# Patient Record
Sex: MALE | Race: WHITE | NOT HISPANIC OR LATINO | Employment: UNEMPLOYED | URBAN - METROPOLITAN AREA
[De-identification: names, ages, dates, MRNs, and addresses within clinical notes are randomized per-mention and may not be internally consistent; named-entity substitution may affect disease eponyms.]

---

## 2017-01-23 ENCOUNTER — HOSPITAL ENCOUNTER (OUTPATIENT)
Dept: INFUSION THERAPY | Facility: HOSPITAL | Age: 67
Discharge: HOME | End: 2017-01-23
Admitting: INTERNAL MEDICINE

## 2017-01-23 VITALS
RESPIRATION RATE: 16 BRPM | DIASTOLIC BLOOD PRESSURE: 57 MMHG | TEMPERATURE: 98.1 F | OXYGEN SATURATION: 91 % | SYSTOLIC BLOOD PRESSURE: 109 MMHG | HEART RATE: 69 BPM

## 2017-01-23 DIAGNOSIS — D64.9 ANEMIA, UNSPECIFIED TYPE: Primary | ICD-10-CM

## 2017-01-23 LAB
ABO GROUP BLD: NORMAL
BLD GP AB SCN SERPL QL: NEGATIVE
RH BLD: POSITIVE

## 2017-01-23 PROCEDURE — 86901 BLOOD TYPING SEROLOGIC RH(D): CPT

## 2017-01-23 PROCEDURE — 86900 BLOOD TYPING SEROLOGIC ABO: CPT

## 2017-01-23 PROCEDURE — 36430 TRANSFUSION BLD/BLD COMPNT: CPT

## 2017-01-23 PROCEDURE — 86850 RBC ANTIBODY SCREEN: CPT

## 2017-01-23 PROCEDURE — G0463 HOSPITAL OUTPT CLINIC VISIT: HCPCS

## 2017-01-23 PROCEDURE — 94640 AIRWAY INHALATION TREATMENT: CPT

## 2017-01-23 PROCEDURE — P9016 RBC LEUKOCYTES REDUCED: HCPCS

## 2017-01-23 PROCEDURE — 36415 COLL VENOUS BLD VENIPUNCTURE: CPT

## 2017-01-23 PROCEDURE — A9270 NON-COVERED ITEM OR SERVICE: HCPCS | Performed by: INTERNAL MEDICINE

## 2017-01-23 PROCEDURE — 63710000001 ACETAMINOPHEN 325 MG TABLET: Performed by: INTERNAL MEDICINE

## 2017-01-23 PROCEDURE — 63710000001 ALBUTEROL PER 1 MG: Performed by: INTERNAL MEDICINE

## 2017-01-23 PROCEDURE — 86920 COMPATIBILITY TEST SPIN: CPT

## 2017-01-23 PROCEDURE — 94799 UNLISTED PULMONARY SVC/PX: CPT

## 2017-01-23 RX ORDER — PROMETHAZINE HYDROCHLORIDE 25 MG/1
25 TABLET ORAL
COMMUNITY
Start: 2016-10-20

## 2017-01-23 RX ORDER — ALBUTEROL SULFATE 90 UG/1
2 AEROSOL, METERED RESPIRATORY (INHALATION) EVERY 6 HOURS
COMMUNITY

## 2017-01-23 RX ORDER — ASPIRIN 325 MG
81 TABLET ORAL
COMMUNITY
End: 2017-02-27 | Stop reason: SDUPTHER

## 2017-01-23 RX ORDER — ALBUTEROL SULFATE 2.5 MG/3ML
2.5 SOLUTION RESPIRATORY (INHALATION) EVERY 4 HOURS PRN
Status: DISCONTINUED | OUTPATIENT
Start: 2017-01-23 | End: 2017-01-25 | Stop reason: HOSPADM

## 2017-01-23 RX ORDER — ACETAMINOPHEN 325 MG/1
650 TABLET ORAL EVERY 4 HOURS PRN
Status: DISCONTINUED | OUTPATIENT
Start: 2017-01-23 | End: 2017-01-25 | Stop reason: HOSPADM

## 2017-01-23 RX ORDER — POTASSIUM CHLORIDE 20MEQ/15ML
40 LIQUID (ML) ORAL DAILY
COMMUNITY

## 2017-01-23 RX ORDER — LISINOPRIL 5 MG/1
5 TABLET ORAL
COMMUNITY

## 2017-01-23 RX ORDER — ZOLPIDEM TARTRATE 5 MG/1
2.5 TABLET ORAL DAILY
COMMUNITY

## 2017-01-23 RX ADMIN — ALBUTEROL SULFATE 2.5 MG: 2.5 SOLUTION RESPIRATORY (INHALATION) at 13:38

## 2017-01-23 RX ADMIN — ACETAMINOPHEN 650 MG: 325 TABLET ORAL at 11:41

## 2017-01-23 NOTE — PROGRESS NOTES
Patient alert and oriented. No complaints as present. Lungs sound congested. Patient coughing up large amount of yellowish green tinged thick mucous. Call placed to Dr. Muhammad at 0822 to get orders for breathing treatment. Feeding tube in place but plugged; patient states only uses at night.

## 2017-01-23 NOTE — PROGRESS NOTES
Breath sounds diminished but mostly clear. Continues to have productive cough. Respiratory here to give mini neb. Oxygen sat 96%.

## 2017-01-23 NOTE — PROGRESS NOTES
0835 Spoke with WILLIE Wade, who gave orders for Tylenol and albuterol min nebs. See orders for details.

## 2017-01-23 NOTE — IP AVS SNAPSHOT
Neville Mendez   1/23/2017  8:30 AM   BLOOD TRANSFUSION    Provider:  ROOM [facility]   Department:  [facility] CARE   Dept Phone:  [phone]                Your Full Care Plan           Instructions    Blood Transfusion, Care After  These instructions give you information about caring for yourself after your procedure. Your doctor may also give you more specific instructions. Call your doctor if you have any problems or questions after your procedure.   HOME CARE  · Take medicines only as told by your doctor. Ask your doctor if you can take an over-the-counter pain reliever if you have a fever or headache a day or two after your procedure.  · Return to your normal activities as told by your doctor.  GET HELP IF:   · You develop redness or irritation at your IV site.  · You have a fever, chills, or a headache that does not go away.  · Your pee (urine) is darker than normal.  · Your urine turns:    Pink.    Red.    Brown.  · The white part of your eye turns yellow (jaundice).  · You feel weak after doing your normal activities.  GET HELP RIGHT AWAY IF:   · You have trouble breathing.  · You have fever and chills and you also have:    Anxiety.    Chest or back pain.    Flushed or pink skin.    Clammy or sweaty skin.    A fast heartbeat.    A sick feeling in your stomach (nausea).     This information is not intended to replace advice given to you by your health care provider. Make sure you discuss any questions you have with your health care provider.     Document Released: 01/08/2016 Document Reviewed: 01/08/2016  [employer] Interactive Patient Education ©2016 [employer]            Your Updated Medication List      ASK your doctor about these medications     albuterol 108 (90 BASE) MCG/ACT inhaler   Commonly known as:  PROVENTIL HFA;VENTOLIN HFA       aspirin 325 MG tablet       guaiFENesin 100 MG/5ML solution oral solution   Commonly known as:  ROBITUSSIN       lisinopril  5 MG tablet   Commonly known as:  PRINIVIL,ZESTRIL       potassium chloride 20 MEQ/15ML (10%) solution   Commonly known as:  KAYCIEL       promethazine 25 MG tablet   Commonly known as:  PHENERGAN       zolpidem 5 MG tablet   Commonly known as:  AMBIEN               MyChart Signup     Our records indicate that you have declined [facility] signup. If you would like to sign up for [facility], please email [email or web address] or call [phone] to obtain an activation code.             Other Info from Your Visit           Allergies     No Known Allergies      Reason for Visit     Outpatient Infusion blood transfusion      Vital Signs     Blood Pressure Pulse Temperature Respirations Oxygen Saturation Smoking Status    121/70 77 98.2 °F (36.8 °C) (Oral) 18 91% Former Smoker      Discharge Instructions     None         SYMPTOMS OF A STROKE    Call 911 or have someone take you to the Emergency Department if you have any of the following:    · Sudden numbness or weakness of your face, arm or leg especially on one side of the body  · Sudden confusion, diffiiculty speaking or trouble understanding   · Changes in your vision or loss of sight in one eye  · Sudden severe headache with no known cause  · sudden dizziness, trouble walking, loss of balance or coordination    It is important to seek emergency care right away if you have further stroke symptoms. If you get emergency help quickly, the powerful clot-dissolving medicines can reduce the disabilities caused by a stroke.     For more information:    American Stroke Association  4-748-6-STROKE  www.strokeassociation.org           IF YOU SMOKE OR USE TOBACCO PLEASE READ THE FOLLOWING:    Why is smoking bad for me?  Smoking increases the risk of heart disease, lung disease, vascular disease, stroke, and cancer.     If you smoke, STOP!    If you would like more information on quitting smoking, please visit the [facility] website:  [email or web address]   This link will provide additional resources including the QUIT line and the [employer] support groups.     For more information:    American Cancer Society  ([phone]    American Heart Association  1-840.827.7657

## 2017-01-23 NOTE — PROGRESS NOTES
Tylenol 650 mg crushed, dissolved and given through feeding tube. Feeding tube would not flush so had to rinse out the connector; then flushed easily. Total of 180 cc's water used. Thick yellow drainage noted around and leaking from feeding tube; site cleansed with peroxide and redressed.

## 2017-01-23 NOTE — PATIENT INSTRUCTIONS
Blood Transfusion, Care After  These instructions give you information about caring for yourself after your procedure. Your doctor may also give you more specific instructions. Call your doctor if you have any problems or questions after your procedure.   HOME CARE  · Take medicines only as told by your doctor. Ask your doctor if you can take an over-the-counter pain reliever if you have a fever or headache a day or two after your procedure.  · Return to your normal activities as told by your doctor.  GET HELP IF:   · You develop redness or irritation at your IV site.  · You have a fever, chills, or a headache that does not go away.  · Your pee (urine) is darker than normal.  · Your urine turns:    Pink.    Red.    Brown.  · The white part of your eye turns yellow (jaundice).  · You feel weak after doing your normal activities.  GET HELP RIGHT AWAY IF:   · You have trouble breathing.  · You have fever and chills and you also have:    Anxiety.    Chest or back pain.    Flushed or pink skin.    Clammy or sweaty skin.    A fast heartbeat.    A sick feeling in your stomach (nausea).     This information is not intended to replace advice given to you by your health care provider. Make sure you discuss any questions you have with your health care provider.     Document Released: 01/08/2016 Document Reviewed: 01/08/2016  [employer] Interactive Patient Education ©2016 [employer]

## 2017-01-23 NOTE — PROGRESS NOTES
Tolerated transfusion with no concerns or evidence of reaction. Respirations appear un-labored however oxygen sat 91% on room air. Had one mini neb while patricio. Consistent productive cough with large amounts of yellow tinged thick mucous. Very pleasant. Poor appititte.

## 2017-01-24 LAB
ABO + RH BLD: NORMAL
ABO + RH BLD: NORMAL
BH BB BLOOD EXPIRATION DATE: NORMAL
BH BB BLOOD EXPIRATION DATE: NORMAL
BH BB BLOOD TYPE BARCODE: 5100
BH BB BLOOD TYPE BARCODE: 5100
BH BB DISPENSE STATUS: NORMAL
BH BB DISPENSE STATUS: NORMAL
BH BB PRODUCT CODE: NORMAL
BH BB PRODUCT CODE: NORMAL
BH BB UNIT NUMBER: NORMAL
BH BB UNIT NUMBER: NORMAL
CROSSMATCH INTERPRETATION: NORMAL
CROSSMATCH INTERPRETATION: NORMAL
UNIT  ABO: NORMAL
UNIT  ABO: NORMAL
UNIT  RH: NORMAL
UNIT  RH: NORMAL

## 2017-02-03 ENCOUNTER — HOSPITAL ENCOUNTER (INPATIENT)
Facility: HOSPITAL | Age: 67
LOS: 6 days | Discharge: NURSING, REHAB OR LONG-TERM CARE | End: 2017-02-09
Attending: EMERGENCY MEDICINE | Admitting: INTERNAL MEDICINE

## 2017-02-03 ENCOUNTER — APPOINTMENT (OUTPATIENT)
Dept: GENERAL RADIOLOGY | Facility: HOSPITAL | Age: 67
End: 2017-02-03

## 2017-02-03 DIAGNOSIS — J18.9 PNEUMONIA OF RIGHT LOWER LOBE DUE TO INFECTIOUS ORGANISM: Primary | ICD-10-CM

## 2017-02-03 DIAGNOSIS — Z66 DNR (DO NOT RESUSCITATE): ICD-10-CM

## 2017-02-03 DIAGNOSIS — F03.90 DEMENTIA WITHOUT BEHAVIORAL DISTURBANCE, UNSPECIFIED DEMENTIA TYPE: ICD-10-CM

## 2017-02-03 DIAGNOSIS — R26.2 DIFFICULTY WALKING: ICD-10-CM

## 2017-02-03 DIAGNOSIS — R91.8 LUNG MASS: ICD-10-CM

## 2017-02-03 DIAGNOSIS — R58 BLEEDING: ICD-10-CM

## 2017-02-03 DIAGNOSIS — R13.10 DYSPHAGIA, UNSPECIFIED TYPE: ICD-10-CM

## 2017-02-03 LAB
ABO GROUP BLD: NORMAL
ALBUMIN SERPL-MCNC: 3.5 G/DL (ref 3.5–5.2)
ALBUMIN/GLOB SERPL: 0.7 G/DL
ALP SERPL-CCNC: 84 U/L (ref 39–117)
ALT SERPL W P-5'-P-CCNC: 13 U/L (ref 1–41)
ANION GAP SERPL CALCULATED.3IONS-SCNC: 10.5 MMOL/L
AST SERPL-CCNC: 16 U/L (ref 1–40)
BASOPHILS # BLD AUTO: 0.01 10*3/MM3 (ref 0–0.2)
BASOPHILS NFR BLD AUTO: 0.1 % (ref 0–1.5)
BILIRUB SERPL-MCNC: 0.6 MG/DL (ref 0.1–1.2)
BLD GP AB SCN SERPL QL: NEGATIVE
BUN BLD-MCNC: 13 MG/DL (ref 8–23)
BUN/CREAT SERPL: 21.3 (ref 7–25)
CALCIUM SPEC-SCNC: 9.7 MG/DL (ref 8.6–10.5)
CHLORIDE SERPL-SCNC: 89 MMOL/L (ref 98–107)
CO2 SERPL-SCNC: 32.5 MMOL/L (ref 22–29)
CREAT BLD-MCNC: 0.61 MG/DL (ref 0.76–1.27)
D-LACTATE SERPL-SCNC: 1.5 MMOL/L (ref 0.5–2)
DEPRECATED RDW RBC AUTO: 47.4 FL (ref 37–54)
EOSINOPHIL # BLD AUTO: 0.11 10*3/MM3 (ref 0–0.7)
EOSINOPHIL NFR BLD AUTO: 0.9 % (ref 0.3–6.2)
ERYTHROCYTE [DISTWIDTH] IN BLOOD BY AUTOMATED COUNT: 14.7 % (ref 11.5–14.5)
GFR SERPL CREATININE-BSD FRML MDRD: 131 ML/MIN/1.73
GLOBULIN UR ELPH-MCNC: 4.7 GM/DL
GLUCOSE BLD-MCNC: 135 MG/DL (ref 65–99)
HBA1C MFR BLD: 5.37 % (ref 4.8–5.6)
HCT VFR BLD AUTO: 35.1 % (ref 40.4–52.2)
HGB BLD-MCNC: 11.1 G/DL (ref 13.7–17.6)
IMM GRANULOCYTES # BLD: 0.04 10*3/MM3 (ref 0–0.03)
IMM GRANULOCYTES NFR BLD: 0.3 % (ref 0–0.5)
INR PPP: 1.19 (ref 0.9–1.1)
LYMPHOCYTES # BLD AUTO: 0.96 10*3/MM3 (ref 0.9–4.8)
LYMPHOCYTES NFR BLD AUTO: 8.1 % (ref 19.6–45.3)
MCH RBC QN AUTO: 27.8 PG (ref 27–32.7)
MCHC RBC AUTO-ENTMCNC: 31.6 G/DL (ref 32.6–36.4)
MCV RBC AUTO: 87.8 FL (ref 79.8–96.2)
MONOCYTES # BLD AUTO: 0.85 10*3/MM3 (ref 0.2–1.2)
MONOCYTES NFR BLD AUTO: 7.2 % (ref 5–12)
NEUTROPHILS # BLD AUTO: 9.81 10*3/MM3 (ref 1.9–8.1)
NEUTROPHILS NFR BLD AUTO: 83.4 % (ref 42.7–76)
PLATELET # BLD AUTO: 558 10*3/MM3 (ref 140–500)
PMV BLD AUTO: 9.3 FL (ref 6–12)
POTASSIUM BLD-SCNC: 3.9 MMOL/L (ref 3.5–5.2)
PROCALCITONIN SERPL-MCNC: 0.12 NG/ML (ref 0.1–0.25)
PROT SERPL-MCNC: 8.2 G/DL (ref 6–8.5)
PROTHROMBIN TIME: 14.7 SECONDS (ref 11.7–14.2)
RBC # BLD AUTO: 4 10*6/MM3 (ref 4.6–6)
RH BLD: POSITIVE
SODIUM BLD-SCNC: 132 MMOL/L (ref 136–145)
WBC NRBC COR # BLD: 11.78 10*3/MM3 (ref 4.5–10.7)

## 2017-02-03 PROCEDURE — 86850 RBC ANTIBODY SCREEN: CPT

## 2017-02-03 PROCEDURE — 84145 PROCALCITONIN (PCT): CPT | Performed by: EMERGENCY MEDICINE

## 2017-02-03 PROCEDURE — 36415 COLL VENOUS BLD VENIPUNCTURE: CPT

## 2017-02-03 PROCEDURE — 85610 PROTHROMBIN TIME: CPT | Performed by: EMERGENCY MEDICINE

## 2017-02-03 PROCEDURE — 87040 BLOOD CULTURE FOR BACTERIA: CPT | Performed by: EMERGENCY MEDICINE

## 2017-02-03 PROCEDURE — 86900 BLOOD TYPING SEROLOGIC ABO: CPT

## 2017-02-03 PROCEDURE — 71010 HC CHEST PA OR AP: CPT

## 2017-02-03 PROCEDURE — 87899 AGENT NOS ASSAY W/OPTIC: CPT | Performed by: INTERNAL MEDICINE

## 2017-02-03 PROCEDURE — 87449 NOS EACH ORGANISM AG IA: CPT | Performed by: INTERNAL MEDICINE

## 2017-02-03 PROCEDURE — 85025 COMPLETE CBC W/AUTO DIFF WBC: CPT | Performed by: EMERGENCY MEDICINE

## 2017-02-03 PROCEDURE — 83605 ASSAY OF LACTIC ACID: CPT | Performed by: EMERGENCY MEDICINE

## 2017-02-03 PROCEDURE — 25010000002 PIPERACILLIN SOD-TAZOBACTAM PER 1 G: Performed by: EMERGENCY MEDICINE

## 2017-02-03 PROCEDURE — 83036 HEMOGLOBIN GLYCOSYLATED A1C: CPT | Performed by: INTERNAL MEDICINE

## 2017-02-03 PROCEDURE — 25810000003 DEXTROSE-NACL PER 500 ML: Performed by: INTERNAL MEDICINE

## 2017-02-03 PROCEDURE — 99285 EMERGENCY DEPT VISIT HI MDM: CPT

## 2017-02-03 PROCEDURE — 86901 BLOOD TYPING SEROLOGIC RH(D): CPT

## 2017-02-03 PROCEDURE — 25010000002 VANCOMYCIN: Performed by: EMERGENCY MEDICINE

## 2017-02-03 PROCEDURE — 80053 COMPREHEN METABOLIC PANEL: CPT | Performed by: EMERGENCY MEDICINE

## 2017-02-03 RX ORDER — ASPIRIN 81 MG/1
81 TABLET, CHEWABLE ORAL DAILY
COMMUNITY

## 2017-02-03 RX ORDER — DEXTROSE AND SODIUM CHLORIDE 5; .45 G/100ML; G/100ML
100 INJECTION, SOLUTION INTRAVENOUS CONTINUOUS
Status: DISCONTINUED | OUTPATIENT
Start: 2017-02-03 | End: 2017-02-03

## 2017-02-03 RX ORDER — CALCIUM POLYCARBOPHIL 625 MG 625 MG/1
625 TABLET ORAL 2 TIMES DAILY
COMMUNITY

## 2017-02-03 RX ORDER — MIRTAZAPINE 15 MG/1
7.5 TABLET, FILM COATED ORAL NIGHTLY
COMMUNITY

## 2017-02-03 RX ORDER — HYDROCODONE BITARTRATE AND ACETAMINOPHEN 5; 325 MG/1; MG/1
1 TABLET ORAL EVERY 4 HOURS PRN
Status: DISCONTINUED | OUTPATIENT
Start: 2017-02-03 | End: 2017-02-09 | Stop reason: HOSPADM

## 2017-02-03 RX ORDER — LISINOPRIL 5 MG/1
5 TABLET ORAL
Status: DISCONTINUED | OUTPATIENT
Start: 2017-02-04 | End: 2017-02-09 | Stop reason: HOSPADM

## 2017-02-03 RX ORDER — ASPIRIN 81 MG/1
81 TABLET, CHEWABLE ORAL DAILY
Status: DISCONTINUED | OUTPATIENT
Start: 2017-02-03 | End: 2017-02-09 | Stop reason: HOSPADM

## 2017-02-03 RX ORDER — DIPHENOXYLATE HYDROCHLORIDE AND ATROPINE SULFATE 2.5; .025 MG/1; MG/1
1 TABLET ORAL DAILY
Status: DISCONTINUED | OUTPATIENT
Start: 2017-02-04 | End: 2017-02-09 | Stop reason: HOSPADM

## 2017-02-03 RX ORDER — NUT.TX.GLUCOSE INTOLERANCE,SOY
30 BAR ORAL 3 TIMES DAILY
COMMUNITY

## 2017-02-03 RX ORDER — VANCOMYCIN HYDROCHLORIDE 1 G/200ML
15 INJECTION, SOLUTION INTRAVENOUS EVERY 12 HOURS
Status: DISCONTINUED | OUTPATIENT
Start: 2017-02-04 | End: 2017-02-03 | Stop reason: CLARIF

## 2017-02-03 RX ORDER — ALBUTEROL SULFATE 2.5 MG/3ML
2.5 SOLUTION RESPIRATORY (INHALATION) 3 TIMES DAILY
COMMUNITY
End: 2017-02-27 | Stop reason: SDUPTHER

## 2017-02-03 RX ORDER — ALBUTEROL SULFATE 90 UG/1
2 AEROSOL, METERED RESPIRATORY (INHALATION) EVERY 4 HOURS PRN
COMMUNITY
End: 2017-02-27 | Stop reason: SDUPTHER

## 2017-02-03 RX ORDER — CALCIUM POLYCARBOPHIL 625 MG 625 MG/1
625 TABLET ORAL 2 TIMES DAILY
Status: DISCONTINUED | OUTPATIENT
Start: 2017-02-03 | End: 2017-02-09 | Stop reason: HOSPADM

## 2017-02-03 RX ORDER — CLOBETASOL PROPIONATE 0.5 MG/G
OINTMENT TOPICAL DAILY
COMMUNITY

## 2017-02-03 RX ORDER — MULTIVIT-MIN/IRON/FOLIC ACID/K 18-600-40
1 CAPSULE ORAL DAILY
COMMUNITY

## 2017-02-03 RX ORDER — DEXTROSE AND SODIUM CHLORIDE 5; .9 G/100ML; G/100ML
75 INJECTION, SOLUTION INTRAVENOUS CONTINUOUS
Status: DISCONTINUED | OUTPATIENT
Start: 2017-02-03 | End: 2017-02-09 | Stop reason: HOSPADM

## 2017-02-03 RX ORDER — LACTOSE-REDUCED FOOD/FIBER
LIQUID (ML) ORAL
COMMUNITY

## 2017-02-03 RX ORDER — ZOLPIDEM TARTRATE 5 MG/1
2.5 TABLET ORAL DAILY
Status: DISCONTINUED | OUTPATIENT
Start: 2017-02-03 | End: 2017-02-04

## 2017-02-03 RX ORDER — IBUPROFEN 600 MG/1
600 TABLET ORAL 3 TIMES DAILY
COMMUNITY

## 2017-02-03 RX ORDER — ASCORBIC ACID 500 MG
500 TABLET ORAL DAILY
Status: DISCONTINUED | OUTPATIENT
Start: 2017-02-04 | End: 2017-02-09 | Stop reason: HOSPADM

## 2017-02-03 RX ORDER — SODIUM CHLORIDE 0.9 % (FLUSH) 0.9 %
10 SYRINGE (ML) INJECTION AS NEEDED
Status: DISCONTINUED | OUTPATIENT
Start: 2017-02-03 | End: 2017-02-09 | Stop reason: HOSPADM

## 2017-02-03 RX ORDER — HYDROCODONE BITARTRATE AND ACETAMINOPHEN 5; 325 MG/1; MG/1
1 TABLET ORAL 4 TIMES DAILY PRN
Status: ON HOLD | COMMUNITY
End: 2017-02-09

## 2017-02-03 RX ORDER — CALCIUM CARBONATE 500(1250)
500 TABLET ORAL 2 TIMES DAILY
Status: DISCONTINUED | OUTPATIENT
Start: 2017-02-03 | End: 2017-02-03 | Stop reason: CLARIF

## 2017-02-03 RX ORDER — ALBUTEROL SULFATE 90 UG/1
2 AEROSOL, METERED RESPIRATORY (INHALATION) EVERY 4 HOURS PRN
Status: DISCONTINUED | OUTPATIENT
Start: 2017-02-03 | End: 2017-02-05 | Stop reason: CLARIF

## 2017-02-03 RX ORDER — SODIUM CHLORIDE 0.9 % (FLUSH) 0.9 %
1-10 SYRINGE (ML) INJECTION AS NEEDED
Status: DISCONTINUED | OUTPATIENT
Start: 2017-02-03 | End: 2017-02-09 | Stop reason: HOSPADM

## 2017-02-03 RX ORDER — CALCIUM CARBONATE 500(1250)
500 TABLET ORAL 2 TIMES DAILY
COMMUNITY

## 2017-02-03 RX ORDER — MIRTAZAPINE 15 MG/1
7.5 TABLET, FILM COATED ORAL NIGHTLY
Status: DISCONTINUED | OUTPATIENT
Start: 2017-02-03 | End: 2017-02-09 | Stop reason: HOSPADM

## 2017-02-03 RX ORDER — ALBUTEROL SULFATE 2.5 MG/3ML
2.5 SOLUTION RESPIRATORY (INHALATION) EVERY 4 HOURS PRN
COMMUNITY
End: 2017-02-27 | Stop reason: SDUPTHER

## 2017-02-03 RX ADMIN — VANCOMYCIN HYDROCHLORIDE 1500 MG: 1 INJECTION, POWDER, LYOPHILIZED, FOR SOLUTION INTRAVENOUS at 18:01

## 2017-02-03 RX ADMIN — DEXTROSE AND SODIUM CHLORIDE 75 ML/HR: 5; 900 INJECTION, SOLUTION INTRAVENOUS at 22:42

## 2017-02-03 RX ADMIN — TAZOBACTAM SODIUM AND PIPERACILLIN SODIUM 4.5 G: 500; 4 INJECTION, SOLUTION INTRAVENOUS at 17:26

## 2017-02-03 NOTE — ED PROVIDER NOTES
EMERGENCY DEPARTMENT ENCOUNTER    CHIEF COMPLAINT  Chief Complaint: Tube Change  History given by: Pt  History limited by: nothing  Room Number: 32/32  PMD: No Known Provider  NH: [facility]    HPI:  Pt is a 66 y.o. male who presents [facility] complaining of bleeding around his g-tube, which he uses for nutrition, which started last night while sleeping. Pt states his tube has never been changed out and may be up to one year old. Pt denies recent illness or any damage to the tube. Pt also denies seeing any blood in the tube. Pt states he has a hx of throat cancer, which was treated with surgery. Pt also c/o a cough with yellow sputum for a week.    Duration:  Several hours  Onset: unknown  Timing: constant  Location: g-tube  Radiation: none  Quality: bleeding  Intensity/Severity: moderate  Progression: unchanged  Associated Symptoms: Pt has had a cough with yellow sputum for a week.  Aggravating Factors: none  Alleviating Factors: none  Previous Episodes: none  Treatment before arrival: none    PAST MEDICAL HISTORY  Active Ambulatory Problems     Diagnosis Date Noted   • No Active Ambulatory Problems     Resolved Ambulatory Problems     Diagnosis Date Noted   • No Resolved Ambulatory Problems     Past Medical History   Diagnosis Date   • Brain cancer    • COPD (chronic obstructive pulmonary disease)    • Coronary artery disease    • Dysphagia    • Hiatal hernia    • Hyperlipidemia    • Hypertension        PAST SURGICAL HISTORY  Past Surgical History   Procedure Laterality Date   • Spine surgery     • Cardiac surgery         FAMILY HISTORY  History reviewed. No pertinent family history.    SOCIAL HISTORY  Social History     Social History   • Marital status:      Spouse name: N/A   • Number of children: N/A   • Years of education: N/A     Occupational History   • Not on file.     Social History Main Topics   • Smoking status: Former Smoker   • Smokeless tobacco: Not on file       Comment: stopped about a year ago   • Alcohol use Not on file   • Drug use: Not on file   • Sexual activity: Not on file     Other Topics Concern   • Not on file     Social History Narrative   • No narrative on file       ALLERGIES  Review of patient's allergies indicates no known allergies.    REVIEW OF SYSTEMS  Review of Systems   Constitutional: Negative for activity change, appetite change and fever.   HENT: Negative for congestion and sore throat.    Eyes: Negative.    Respiratory: Positive for cough (with yellow sputum). Negative for shortness of breath.    Cardiovascular: Negative for chest pain and leg swelling.   Gastrointestinal: Negative for abdominal pain, diarrhea and vomiting.   Endocrine: Negative.    Genitourinary: Negative for decreased urine volume and dysuria.   Musculoskeletal: Negative for neck pain.   Skin: Positive for wound (bleeding around g-tube site). Negative for rash.   Allergic/Immunologic: Negative.    Neurological: Negative for weakness, numbness and headaches.   Hematological: Negative.    Psychiatric/Behavioral: Negative.    All other systems reviewed and are negative.      PHYSICAL EXAM  ED Triage Vitals   Temp Heart Rate Resp BP SpO2   02/03/17 1523 02/03/17 1523 02/03/17 1523 02/03/17 1523 02/03/17 1523   99.1 °F (37.3 °C) 92 18 135/93 95 %      Temp src Heart Rate Source Patient Position BP Location FiO2 (%)   02/03/17 1523 02/03/17 1523 02/03/17 1523 02/03/17 1523 --   Tympanic Monitor Lying Right arm        Physical Exam   Constitutional: He is oriented to person, place, and time and well-developed, well-nourished, and in no distress.   HENT:   Head: Normocephalic and atraumatic.   Eyes: EOM are normal. Pupils are equal, round, and reactive to light.   Neck: Normal range of motion. Neck supple.   Cardiovascular: Normal rate, regular rhythm and normal heart sounds.    HR=80s   Pulmonary/Chest: Effort normal. No respiratory distress. He has rhonchi (mild) in the right lower  field.   Pt has a cough   Abdominal: Soft. He exhibits no distension. There is no tenderness. There is no rebound and no guarding.   g-tube in left upper quadrant with bleeding around it and dressing in place which is saturated in blood.   Musculoskeletal: Normal range of motion. He exhibits no edema.   Neurological: He is alert and oriented to person, place, and time. He has normal sensation and normal strength.   Skin: Skin is warm and dry.   Psychiatric: Mood and affect normal.   Nursing note and vitals reviewed.      LAB RESULTS  Lab Results (last 24 hours)     Procedure Component Value Units Date/Time    CBC & Differential [[id number]] Collected:  02/03/17 1651    Specimen:  Blood Updated:  02/03/17 1730    Narrative:       The following orders were created for panel order CBC & Differential.  Procedure                               Abnormality         Status                     ---------                               -----------         ------                     CBC Auto Differential[[id number]]         Abnormal            Final result                 Please view results for these tests on the individual orders.    Comprehensive Metabolic Panel [[id number]]  (Abnormal) Collected:  02/03/17 1651    Specimen:  Blood Updated:  02/03/17 1742     Glucose 135 (H) mg/dL      BUN 13 mg/dL      Creatinine 0.61 (L) mg/dL      Sodium 132 (L) mmol/L      Potassium 3.9 mmol/L      Chloride 89 (L) mmol/L      CO2 32.5 (H) mmol/L      Calcium 9.7 mg/dL      Total Protein 8.2 g/dL      Albumin 3.50 g/dL      ALT (SGPT) 13 U/L      AST (SGOT) 16 U/L      Alkaline Phosphatase 84 U/L      Total Bilirubin 0.6 mg/dL      eGFR Non African Amer 131 mL/min/1.73      Globulin 4.7 gm/dL      A/G Ratio 0.7 g/dL      BUN/Creatinine Ratio 21.3      Anion Gap 10.5 mmol/L     Protime-INR [[id number]]  (Abnormal) Collected:  02/03/17 1651    Specimen:  Blood Updated:  02/03/17 1742     Protime 14.7 (H) Seconds      INR 1.19 (H)     CBC Auto  "Differential [[id number]]  (Abnormal) Collected:  02/03/17 1651    Specimen:  Blood Updated:  02/03/17 1730     WBC 11.78 (H) 10*3/mm3      RBC 4.00 (L) 10*6/mm3      Hemoglobin 11.1 (L) g/dL      Hematocrit 35.1 (L) %      MCV 87.8 fL      MCH 27.8 pg      MCHC 31.6 (L) g/dL      RDW 14.7 (H) %      RDW-SD 47.4 fl      MPV 9.3 fL      Platelets 558 (H) 10*3/mm3      Neutrophil % 83.4 (H) %      Lymphocyte % 8.1 (L) %      Monocyte % 7.2 %      Eosinophil % 0.9 %      Basophil % 0.1 %      Immature Grans % 0.3 %      Neutrophils, Absolute 9.81 (H) 10*3/mm3      Lymphocytes, Absolute 0.96 10*3/mm3      Monocytes, Absolute 0.85 10*3/mm3      Eosinophils, Absolute 0.11 10*3/mm3      Basophils, Absolute 0.01 10*3/mm3      Immature Grans, Absolute 0.04 (H) 10*3/mm3     Procalcitonin [[id number]]  (Normal) Collected:  02/03/17 1651    Specimen:  Blood Updated:  02/03/17 1746     Procalcitonin 0.12 ng/mL     Narrative:       As a Marker for Sepsis (Non-Neonates):   1. <0.5 ng/mL represents a low risk of severe sepsis and/or septic shock.  1. >2 ng/mL represents a high risk of severe sepsis and/or septic shock.    As a Marker for Lower Respiratory Tract Infections that require antibiotic therapy:  PCT on Admission     Antibiotic Therapy             6-12 Hrs later  > 0.5                Strongly Recommended            >0.25 - <0.5         Recommended  0.1 - 0.25           Discouraged                   Remeasure/reassess PCT  <0.1                 Strongly Discouraged          Remeasure/reassess PCT      As 28 day mortality risk marker: \"Change in Procalcitonin Result\" (> 80 % or <=80 %) if Day 0 (or Day 1) and Day 4 values are available. Refer to http://www.[facility]-pct-calculator.com/   Change in PCT <=80 %   A decrease of PCT levels below or equal to 80 % defines a positive change in PCT test result representing a higher risk for 28-day all-cause mortality of patients diagnosed with severe sepsis or septic shock.  Change in PCT > " 80 %   A decrease of PCT levels of more than 80 % defines a negative change in PCT result representing a lower risk for 28-day all-cause mortality of patients diagnosed with severe sepsis or septic shock.                Lactic Acid, Plasma [[id number]]  (Normal) Collected:  02/03/17 1713    Specimen:  Blood Updated:  02/03/17 1736     Lactate 1.5 mmol/L     Blood Culture [[id number]] Collected:  02/03/17 1713    Specimen:  Blood from Arm, Left Updated:  02/03/17 1719    Blood Culture [[id number]] Collected:  02/03/17 1713    Specimen:  Blood from Arm, Right Updated:  02/03/17 1719          I ordered the above labs and reviewed the results    RADIOLOGY  XR Chest 1 View   Final Result   1. Acute right base pneumonia.  2. No other change.  3. Recommend follow-up to confirm resolution        I ordered the above noted radiological studies. Interpreted by radiologist. Discussed with radiologist (Dr. Aden). Reviewed by me in PACS.       PROCEDURES  Procedures      PROGRESS AND CONSULTS  ED Course   1610  Ordered blood work, Protime-INR, and CXR for further analysis.    4:56 PM  Ordered Zocon and Vanc for healthcare acquired pneumonia.     5:13 PM  Rechecked with pt, who is resting comfortably. Informed pt of CXR showing pneumonia and plan to admit to hospital and treat with abx. Informed pt of plan to have GI specialist consult on pt's g-tube. Pt understands and agrees with the plan and all questions were addressed.   Upon reevaluation, pt has minimal bleeding on tube site. Placed SurgiSeal, Thrombi-Pad, 4x4's, and abdominal dressing with good hemostasis. Ordered pt be placed on 2L O2.     5:48 PM  Discussed pt's case with Dr. Burgess, hospitalist, who agrees to admit the pt to telemetry for pneumonia and consult GI to see the pt in the morning.     5:54 PM  Rechecked with pt who is resting comfortably in no acute distress and no active bleeding. Informed pt that Dr. Burgess will admit him to telemetry.    MEDICAL DECISION  MAKING  Results were reviewed/discussed with the patient and they were also made aware of online access. Pt also made aware that some labs, such as cultures, will not be resulted during ER visit and follow up with PMD is necessary.     MDM  Number of Diagnoses or Management Options  Bleeding from g-tube:   Pneumonia of right lower lobe due to infectious organism:      Amount and/or Complexity of Data Reviewed  Clinical lab tests: reviewed and ordered (Hemoglobin 11.1, WBC=11.78, lactate=1.5)  Tests in the radiology section of CPT®: reviewed and ordered (CXR shows acute right base pneumonia with no other changes.)  Decide to obtain previous medical records or to obtain history from someone other than the patient: yes  Review and summarize past medical records: yes (Pt has a hx of neck and throat cancer. Pt was sent in my NH due to cough and leaking around g-tube site.)  Discuss the patient with other providers: yes (Dr. Burgess, hospitalist)           DIAGNOSIS  Final diagnoses:   Pneumonia of right lower lobe due to infectious organism   Bleeding from g-tube       DISPOSITION  ADMISSION    Discussed treatment plan and reason for admission with pt/family and admitting physician.  Pt/family voiced understanding of the plan for admission for further testing/treatment as needed.         Latest Documented Vital Signs:  As of 6:32 PM  BP- 130/66 HR- 69 Temp- 99.1 °F (37.3 °C) (Tympanic) O2 sat- 100%    --  Documentation assistance provided by gavino Page for Dr. Ho.  Information recorded by the scribe was done at my direction and has been verified and validated by me.              Ronal Page  02/03/17 1818       Anil Ho MD  02/03/17 2777

## 2017-02-04 ENCOUNTER — APPOINTMENT (OUTPATIENT)
Dept: CT IMAGING | Facility: HOSPITAL | Age: 67
End: 2017-02-04

## 2017-02-04 LAB
ANION GAP SERPL CALCULATED.3IONS-SCNC: 12.6 MMOL/L
BASOPHILS # BLD AUTO: 0.02 10*3/MM3 (ref 0–0.2)
BASOPHILS NFR BLD AUTO: 0.2 % (ref 0–1.5)
BUN BLD-MCNC: 9 MG/DL (ref 8–23)
BUN/CREAT SERPL: 16.4 (ref 7–25)
CALCIUM SPEC-SCNC: 8.4 MG/DL (ref 8.6–10.5)
CHLORIDE SERPL-SCNC: 97 MMOL/L (ref 98–107)
CO2 SERPL-SCNC: 26.4 MMOL/L (ref 22–29)
CREAT BLD-MCNC: 0.55 MG/DL (ref 0.76–1.27)
DEPRECATED RDW RBC AUTO: 46.7 FL (ref 37–54)
EOSINOPHIL # BLD AUTO: 0.21 10*3/MM3 (ref 0–0.7)
EOSINOPHIL NFR BLD AUTO: 1.9 % (ref 0.3–6.2)
ERYTHROCYTE [DISTWIDTH] IN BLOOD BY AUTOMATED COUNT: 14.6 % (ref 11.5–14.5)
GFR SERPL CREATININE-BSD FRML MDRD: 148 ML/MIN/1.73
GLUCOSE BLD-MCNC: 125 MG/DL (ref 65–99)
HCT VFR BLD AUTO: 31.4 % (ref 40.4–52.2)
HGB BLD-MCNC: 9.9 G/DL (ref 13.7–17.6)
IMM GRANULOCYTES # BLD: 0.04 10*3/MM3 (ref 0–0.03)
IMM GRANULOCYTES NFR BLD: 0.4 % (ref 0–0.5)
L PNEUMO1 AG UR QL IA: NEGATIVE
LYMPHOCYTES # BLD AUTO: 1.05 10*3/MM3 (ref 0.9–4.8)
LYMPHOCYTES NFR BLD AUTO: 9.6 % (ref 19.6–45.3)
MCH RBC QN AUTO: 27.6 PG (ref 27–32.7)
MCHC RBC AUTO-ENTMCNC: 31.5 G/DL (ref 32.6–36.4)
MCV RBC AUTO: 87.5 FL (ref 79.8–96.2)
MONOCYTES # BLD AUTO: 0.87 10*3/MM3 (ref 0.2–1.2)
MONOCYTES NFR BLD AUTO: 8 % (ref 5–12)
NEUTROPHILS # BLD AUTO: 8.73 10*3/MM3 (ref 1.9–8.1)
NEUTROPHILS NFR BLD AUTO: 79.9 % (ref 42.7–76)
PLATELET # BLD AUTO: 467 10*3/MM3 (ref 140–500)
PMV BLD AUTO: 9.1 FL (ref 6–12)
POTASSIUM BLD-SCNC: 4.1 MMOL/L (ref 3.5–5.2)
RBC # BLD AUTO: 3.59 10*6/MM3 (ref 4.6–6)
S PNEUM AG SPEC QL LA: NEGATIVE
SODIUM BLD-SCNC: 136 MMOL/L (ref 136–145)
WBC NRBC COR # BLD: 10.92 10*3/MM3 (ref 4.5–10.7)

## 2017-02-04 PROCEDURE — 99222 1ST HOSP IP/OBS MODERATE 55: CPT | Performed by: INTERNAL MEDICINE

## 2017-02-04 PROCEDURE — 25010000002 VANCOMYCIN: Performed by: INTERNAL MEDICINE

## 2017-02-04 PROCEDURE — 80048 BASIC METABOLIC PNL TOTAL CA: CPT | Performed by: INTERNAL MEDICINE

## 2017-02-04 PROCEDURE — 71250 CT THORAX DX C-: CPT

## 2017-02-04 PROCEDURE — 85025 COMPLETE CBC W/AUTO DIFF WBC: CPT | Performed by: INTERNAL MEDICINE

## 2017-02-04 PROCEDURE — 25010000002 PIPERACILLIN SOD-TAZOBACTAM PER 1 G: Performed by: INTERNAL MEDICINE

## 2017-02-04 PROCEDURE — 92610 EVALUATE SWALLOWING FUNCTION: CPT

## 2017-02-04 PROCEDURE — 87081 CULTURE SCREEN ONLY: CPT | Performed by: INTERNAL MEDICINE

## 2017-02-04 PROCEDURE — 97161 PT EVAL LOW COMPLEX 20 MIN: CPT

## 2017-02-04 RX ORDER — ZOLPIDEM TARTRATE 5 MG/1
2.5 TABLET ORAL NIGHTLY PRN
Status: DISCONTINUED | OUTPATIENT
Start: 2017-02-04 | End: 2017-02-09 | Stop reason: HOSPADM

## 2017-02-04 RX ADMIN — TAZOBACTAM SODIUM AND PIPERACILLIN SODIUM 3.38 G: 375; 3 INJECTION, SOLUTION INTRAVENOUS at 12:31

## 2017-02-04 RX ADMIN — VANCOMYCIN HYDROCHLORIDE 1250 MG: 1 INJECTION, POWDER, LYOPHILIZED, FOR SOLUTION INTRAVENOUS at 01:34

## 2017-02-04 RX ADMIN — TAZOBACTAM SODIUM AND PIPERACILLIN SODIUM 3.38 G: 375; 3 INJECTION, SOLUTION INTRAVENOUS at 03:30

## 2017-02-04 RX ADMIN — TAZOBACTAM SODIUM AND PIPERACILLIN SODIUM 3.38 G: 375; 3 INJECTION, SOLUTION INTRAVENOUS at 18:47

## 2017-02-04 RX ADMIN — VANCOMYCIN HYDROCHLORIDE 1250 MG: 1 INJECTION, POWDER, LYOPHILIZED, FOR SOLUTION INTRAVENOUS at 16:47

## 2017-02-04 NOTE — PLAN OF CARE
Problem: Patient Care Overview (Adult)  Goal: Plan of Care Review    02/04/17 1439   Coping/Psychosocial Response Interventions   Plan Of Care Reviewed With patient   Outcome Evaluation   Outcome Summary/Follow up Plan Bedside swallow completed. Pt with significant difficulty with PO. SLP recs NPO, temporary cortrak until VFSS completed.          Problem: Inpatient SLP  Goal: Dysphagia- Patient will improve swallowing skills to begin to take some PO safely    02/04/17 1439   Begin to Take Some PO Safely   Begin to Take Some PO Safely- SLP, Date Established 02/04/17   Begin to Take Some PO Safely- SLP, Time to Achieve by discharge

## 2017-02-04 NOTE — PLAN OF CARE
Problem: Patient Care Overview (Adult)  Goal: Plan of Care Review  Outcome: Ongoing (interventions implemented as appropriate)    02/04/17 1839   Coping/Psychosocial Response Interventions   Plan Of Care Reviewed With patient   Patient Care Overview   Progress no change

## 2017-02-04 NOTE — PLAN OF CARE
Problem: Inpatient Physical Therapy  Goal: Bed Mobility Goal STG- PT  Outcome: Ongoing (interventions implemented as appropriate)    02/04/17 1521   Bed Mobility PT STG   Bed Mobility PT STG, Date Established 02/04/17   Bed Mobility PT STG, Time to Achieve 1 wk   Bed Mobility PT STG, Activity Type all bed mobility   Bed Mobility PT STG, Jackson Level independent       Goal: Transfer Training Goal 1 STG- PT  Outcome: Ongoing (interventions implemented as appropriate)    02/04/17 1521   Transfer Training PT STG   Transfer Training PT STG, Date Established 02/04/17   Transfer Training PT STG, Time to Achieve 1 wk   Transfer Training PT STG, Activity Type all transfers   Transfer Training PT STG, Jackson Level independent  (least vs no AD)       Goal: Gait Training Goal STG- PT  Outcome: Ongoing (interventions implemented as appropriate)    02/04/17 1521   Gait Training PT STG   Gait Training Goal PT STG, Date Established 02/04/17   Gait Training Goal PT STG, Time to Achieve 1 wk   Gait Training Goal PT STG, Jackson Level independent   Gait Training Goal PT STG, Assist Device (least vs no AD)   Gait Training Goal PT STG, Distance to Achieve 300

## 2017-02-04 NOTE — CONSULTS
[facility]  Initial Inpatient Consult Note    Referring Provider: Negrito Lane    Reason for Consultation: Possible G tube replacement/removal    Subjective     History of present illness:    This is a 66-year-old gentleman who is a relatively poor historian but apparently has a history of head and neck cancer status post prior chemotherapy and surgical intervention.  He has a PEG tube placed which he tells me was put in about a year ago.  He does not remember where but thinks it may been a [facility].  He presents today from the nursing home requesting that his PEG tube be removed.  His current nutritional regimen consisted puréed foods during the day followed by nocturnal tube feeding.  He tells me that he has been told that he no longer needs his PEG tube.  Has been some report of possible bleeding around the PEG tube site per the nursing home the patient cannot quadrate this.  He does report some issues with leaking of the tube feeds around the connector and has been taking this area with some success.  He denies any pain associated with his tube feeding regimen.    Patient underwent a bedside swallow today and was felt that he would need a video swallow for more definitive evaluation of his swallow mechanism.      Past Medical History   Diagnosis Date   • SCAR (acute kidney injury)    • Brain cancer      caancer head, neck and face   • COPD (chronic obstructive pulmonary disease)    • Coronary artery disease    • Dysphagia    • Hiatal hernia    • Hyperlipidemia    • Hypertension    • Malignant neoplasm of head    • Squamous cell carcinoma      of neck       Past Surgical History:  Past Surgical History   Procedure Laterality Date   • Spine surgery     • Cardiac surgery          Social History:   Social History   Substance Use Topics   • Smoking status: Former Smoker   • Smokeless tobacco: Not on file      Comment: stopped about a year ago   • Alcohol use No        Family  History:  History reviewed. No pertinent family history.    Home Meds:  Prescriptions Prior to Admission   Medication Sig Dispense Refill Last Dose   • albuterol (PROVENTIL HFA;VENTOLIN HFA) 108 (90 BASE) MCG/ACT inhaler Inhale 2 puffs Every 4 (Four) Hours As Needed for wheezing.      • albuterol (PROVENTIL) (2.5 MG/3ML) 0.083% nebulizer solution Take 2.5 mg by nebulization 3 (Three) Times a Day.      • albuterol (PROVENTIL) (2.5 MG/3ML) 0.083% nebulizer solution Take 2.5 mg by nebulization Every 4 (Four) Hours As Needed for wheezing (with spacer).      • Ascorbic Acid (VITAMIN C) 500 MG capsule 1 tablet by Per G Tube route Daily.      • aspirin 81 MG chewable tablet 81 mg by Per G Tube route Daily.      • clobetasol (TEMOVATE) 0.05 % ointment Apply  topically Daily. Apply to L upper thigh daily til healed      • Emollient (AQUAPHOR ADVANCED THERAPY) ointment Apply 1 application topically. Every shift to front of neck and throat      • guaiFENesin (ROBITUSSIN) 100 MG/5ML solution oral solution 200 mg by Per G Tube route Every 6 (Six) Hours As Needed.      • HYDROcodone-acetaminophen (NORCO) 5-325 MG per tablet Take 1 tablet by mouth 4 (Four) Times a Day As Needed.      • ibuprofen (ADVIL,MOTRIN) 600 MG tablet 600 mg by Per G Tube route 3 (Three) Times a Day.      • lisinopril (PRINIVIL,ZESTRIL) 5 MG tablet 5 mg by Per G Tube route.      • mirtazapine (REMERON) 7.5 MG half tablet 7.5 mg by Per G Tube route Every Night.      • Multiple Vitamin (TAB-A-JONN PO) 1 tablet by Per G Tube route Daily.      • mupirocin (BACTROBAN) 2 % ointment Apply  topically 2 (Two) Times a Day. Cleanse peg with normal saline and peroxide mixture apply oint topically & cover as directed two times daily      • Nutritional Supplements (JEVITY 1.2 ANDERSON) liquid by Per G Tube route. 85 cc per hr from 7p-7a      • Nutritional Supplements (PROMOD) liquid 30 mL by Per G Tube route 3 (Three) Times a Day.      • oyster shell calcium 500 MG tablet  tablet 500 mg by Per G Tube route 2 (Two) Times a Day.      • polycarbophil (FIBER LAXATIVE) 625 MG tablet 625 mg by Per G Tube route 2 (Two) Times a Day.      • potassium chloride (KAYCIEL) 20 MEQ/15ML (10%) solution 40 mEq by Per G Tube route Daily.      • Sodium Fluoride (PREVIDENT 5000 BOOSTER) 1.1 % paste Apply  to teeth every night at bedtime.      • Umeclidinium Bromide (INCRUSE ELLIPTA) 62.5 MCG/INH aerosol powder  Inhale 1 puff Daily.      • Unable to find 1 each 1 (One) Time. H2O flush per GT with 200 cc water every 4 hours      • Unable to find Apply 1 each topically 1 (One) Time. House barrier cream to upper chest and neck area avoiding stoma      • zolpidem (AMBIEN) 5 MG tablet Take 2.5 mg by mouth Daily.      • albuterol (PROVENTIL HFA;VENTOLIN HFA) 108 (90 BASE) MCG/ACT inhaler Inhale 2 puffs Every 6 (Six) Hours.   Unknown at Unknown time   • aspirin 325 MG tablet Take 81 mg by mouth.   Unknown at Unknown time   • promethazine (PHENERGAN) 25 MG tablet Take 25 mg by mouth.   Unknown at Unknown time       Current Meds:     aspirin 81 mg Per G Tube Daily   calcium carbonate 1,250 mg Per G Tube BID With Meals   lisinopril 5 mg Per G Tube Q24H   mirtazapine 7.5 mg Per G Tube Nightly   multivitamin 1 tablet Per G Tube Daily   piperacillin-tazobactam 3.375 g Intravenous Q8H   polycarbophil 625 mg Per G Tube BID   vancomycin 1,250 mg Intravenous Q12H   vitamin C 500 mg Per G Tube Daily   zolpidem 2.5 mg Oral Daily       Allergies:  No Known Allergies    Review of Systems  All systems were reviewed and negative except for:  Constitution:  positive for fatigue  ENT:  positive for sore throat and voice change  Gastrointestinal: postitive for  difficulty / pain with swallowing     Objective     Vital Signs  Temp:  [98.3 °F (36.8 °C)-98.5 °F (36.9 °C)] 98.5 °F (36.9 °C)  Heart Rate:  [63-83] 73  Resp:  [16-18] 16  BP: (111-136)/(57-82) 119/57    Physical Exam:  General Appearance:    Alert, cooperative, in no  acute distress, raspy voice   Head:    Normocephalic, without obvious abnormality, atraumatic   Eyes:            Lids and lashes normal, conjunctivae and sclerae normal, no   icterus   Throat:   No oral lesions, no thrush, oral mucosa moist   Neck:   No adenopathy, supple, trachea midline, no thyromegaly, no   carotid bruit, no JVD   Lungs:     Clear to auscultation,respirations regular, even and                   unlabored    Heart:    Regular rhythm and normal rate, normal S1 and S2, no            murmur, no gallop, no rub, no click   Chest Wall:    No abnormalities observed   Abdomen:     Normal bowel sounds, no masses, no organomegaly, soft        non-tender, non-distended, no guarding, no rebound                 Tenderness. PEG tube is in realitvely good shape.  There is tape at area of tube connector.  There is some stomal granulation tissue, and a small area of irritated stomal tissue at base.  No blood or discharge noted.     Rectal:     Deferred   Extremities:   no edema, no cyanosis, no redness   Skin:   No bleeding, bruising or rash   Lymph nodes:   No palpable adenopathy   Psychiatric:  Judgement and insight: normal   Orientation to person place and time: normal   Mood and affect: normal     Results Review:   I reviewed the patient's new clinical results.      Results from last 7 days  Lab Units 02/04/17  0411 02/03/17  1651   WBC 10*3/mm3 10.92* 11.78*   HEMOGLOBIN g/dL 9.9* 11.1*   HEMATOCRIT % 31.4* 35.1*   PLATELETS 10*3/mm3 467 558*         Results from last 7 days  Lab Units 02/04/17  0411 02/03/17  1651   SODIUM mmol/L 136 132*   POTASSIUM mmol/L 4.1 3.9   CHLORIDE mmol/L 97* 89*   TOTAL CO2 mmol/L 26.4 32.5*   BUN mg/dL 9 13   CREATININE mg/dL 0.55* 0.61*   CALCIUM mg/dL 8.4* 9.7   BILIRUBIN mg/dL  --  0.6   ALK PHOS U/L  --  84   ALT (SGPT) U/L  --  13   AST (SGOT) U/L  --  16   GLUCOSE mg/dL 125* 135*         Results from last 7 days  Lab Units 02/03/17  1651   INR  1.19*       No results found  for: LIPASE    Radiology:  Imaging Results (last 72 hours)     Procedure Component Value Units Date/Time    XR Chest 1 View [[id number]] Collected:  02/03/17 1627     Updated:  02/03/17 1630    Narrative:       EMERGENCY CHEST SINGLE VIEW     HISTORY: 66-year-old male with cough     COMPARISON: 12/08/2014     FINDINGS:  1. Acute right base pneumonia.  2. No other change.  3. Recommend follow-up to confirm resolution     This report was finalized on 2/3/2017 4:27 PM by Dr. Willy Aden MD.             Assessment/Plan     Active Problems:    Pneumonia of right lower lobe due to infectious organism    Dysphagia with history of G tube    Recommendations:  I suspect based on the patient's preliminary bedside swallow that he will likely continue to require nocturnal tube feedings.  We will await the results of his VFSS.  The G-tube itself appears to be in relatively good shape probably get a new connector to place on the distal end.  There is some irritation of the stomal area which we will treat with some barrier cream and I will see if we carry a vertical K[place] which may help with this.  I do not see a clear indication to replace this tube at this time.  Tube feeds can certainly be resumed for now while we await speech pathology evaluation.      I discussed the patients findings and my recommendations with patient    David Campbell MD  [facility]  02/04/17

## 2017-02-04 NOTE — PROGRESS NOTES
Acute Care - Physical Therapy Initial Evaluation  [facility]     Patient Name: Neville Mendez  : 1950  MRN: [id number]  Today's Date: 2017   Onset of Illness/Injury or Date of Surgery Date: 17  Date of Referral to PT: 17  Referring Physician: Nilo      Admit Date: 2/3/2017     Visit Dx:    ICD-10-CM ICD-9-CM   1. Pneumonia of right lower lobe due to infectious organism J18.9 483.8   2. Bleeding from g-tube R58 459.0   3. Difficulty walking R26.2 719.7     Patient Active Problem List   Diagnosis   • Pneumonia of right lower lobe due to infectious organism     Past Medical History   Diagnosis Date   • SCAR (acute kidney injury)    • Brain cancer      caancer head, neck and face   • COPD (chronic obstructive pulmonary disease)    • Coronary artery disease    • Dysphagia    • Hiatal hernia    • Hyperlipidemia    • Hypertension    • Malignant neoplasm of head    • Squamous cell carcinoma      of neck     Past Surgical History   Procedure Laterality Date   • Spine surgery     • Cardiac surgery            PT ASSESSMENT (last 72 hours)      PT Evaluation       17 1500 17 1440    Rehab Evaluation    Document Type  evaluation  -SV    Subjective Information  agree to therapy  -SV    Patient Effort, Rehab Treatment  good  -SV    General Information    Patient Profile Review  yes  -SV    Onset of Illness/Injury or Date of Surgery Date  17  -SV    Referring Physician  Nilo  -SV    General Observations  dressing over Gtube site, Iv, monitors  -SV    Pertinent History Of Current Problem  Pt with hx of throat Cancer and swallowing issues that require G-tube. Pt admit with bleeding from g tube site and PNA  -SV    Prior Level of Function  --   Pt reports decline in amb. He uses rwx or pushes w/c.  -SV    Equipment Currently Used at Home  walker, rolling;wheelchair  -SV    Plans/Goals Discussed With  patient  -SV    Living Environment    Lives With  alone   Pt reports he is in rehab:  not a permanent [facility] resident  -SV    Living Environment Comment  several steps into home  -SV    Clinical Impression    Date of Referral to PT  02/03/17  -SV    PT Diagnosis  difficulty walking  -SV    Functional Level At Time Of Evaluation  cga  -SV    Patient/Family Goals Statement  desires Indep amb without AD  -SV    Criteria for Skilled Therapeutic Interventions Met  treatment indicated  -SV    Pathology/Pathophysiology Noted (Describe Specifically for Each System)  musculoskeletal  -SV    Rehab Potential  good, to achieve stated therapy goals  -SV    Predicted Duration of Therapy Intervention (days/wks)  2 weeks  -SV    Vital Signs    Pre Systolic BP Rehab  119  -SV    Pre Treatment Diastolic BP  57  -SV    Pretreatment Heart Rate (beats/min)  74  -SV    Posttreatment Heart Rate (beats/min)  96  -SV    Pre SpO2 (%)  100  -SV    O2 Delivery Pre Treatment  room air  -SV    Post SpO2 (%)  96  -SV    O2 Delivery Post Treatment  room air  -SV    Pain Assessment    Pain Assessment  No/denies pain  -SV    Vision Assessment/Intervention    Visual Impairment  WFL  -SV    Cognitive Assessment/Intervention    Current Cognitive/Communication Assessment  functional  -SV    Orientation Status  oriented to;person;place  -SV    Follows Commands/Answers Questions  100% of the time;able to follow multi-step instructions  -SV    Personal Safety  WNL/WFL  -SV    Personal Safety Interventions  fall prevention program maintained;nonskid shoes/slippers when out of bed  -SV    ROM (Range of Motion)    General ROM  no range of motion deficits identified  -SV    MMT (Manual Muscle Testing)    General MMT Assessment  other (see comments)   general weakness noted:grossly >3/5 observed sitting  -SV    Bed Mobility, Assessment/Treatment    Bed Mob, Supine to Sit, [place]  contact guard assist  -SV    Bed Mob, Sit to Supine, [place]  contact guard assist  -SV    Transfer Assessment/Treatment    Transfers, Sit-Stand [place]   contact guard assist  -SV    Transfers, Stand-Sit [place]  contact guard assist  -SV    Transfers, Sit-Stand-Sit, Assist Device  rolling walker  -SV    Gait Assessment/Treatment    Gait, [place] Level  contact guard assist  -SV    Gait, Assistive Device  rolling walker  -SV    Gait, Distance (Feet)  150  -SV    Gait, Gait Deviations  forward flexed posture;step length decreased;lissa decreased  -SV    Gait, Safety Issues  step length decreased  -SV    Gait, Impairments  impaired balance;strength decreased  -SV    Therapy Exercises    Bilateral Lower Extremities --   BLE   -SV --   3 reps BLE LAQ with DF 15 count hold  -SV    Positioning and Restraints    Pre-Treatment Position  in bed  -SV    Post Treatment Position  bed  -SV    In Bed  notified nsg;sitting;call light within reach;exit alarm on;encouraged to call for assist  -SV      02/04/17 1436 02/03/17 2018    Rehab Evaluation    Document Type evaluation  -SA     Subjective Information agree to therapy  -SA     Patient Effort, Rehab Treatment good  -SA     Symptoms Noted During/After Treatment other (see comments)   increased coughing  -SA     Living Environment    Lives With  facility resident  -RD    Living Arrangements  group home  -RD    Home Accessibility  no concerns  -RD    Stair Railings at Home  none  -RD    Type of Financial/Environmental Concern  unemployed;no permanent residence  -RD    Transportation Available  none  -RD    Pain Assessment    Pain Assessment No/denies pain  -SA       02/03/17 2015       General Information    Equipment Currently Used at Home wheelchair;walker, standard;medication pump;nutrition supplies  -RD       User Key  (r) = Recorded By, (t) = Taken By, (c) = Cosigned By    Initials Name Provider Type    SA Dorothy Shelton Speech and Language Pathologist    ANABELLE Bernard RN Registered Nurse    NONA Marvin, PT Physical Therapist          Physical Therapy Education     Title: PT OT SLP Therapies (Done)      Topic: Physical Therapy (Done)     Point: Mobility training (Done)    Learning Progress Summary    Learner Readiness Method Response Comment Documented by Status   Patient Acceptance E,D NR,[facility] 02/04/17 1520 Done               Point: Home exercise program (Done)    Learning Progress Summary    Learner Readiness Method Response Comment Documented by Status   Patient Acceptance E,D NR,VU   02/04/17 1520 Done                      User Key     Initials Effective Dates Name Provider Type Discipline     01/17/16 -  Zaria Marvin, PT Physical Therapist PT                PT Recommendation and Plan  Anticipated Discharge Disposition: skilled nursing facility  Plan of Care Review  Plan Of Care Reviewed With: patient  Outcome Summary/Follow up Plan: Pt presents with impaired balance with transfers and amb due to general weakness. Pt desires to eventually return home as and indep amb without AD. Pt will benefit from cont skilled PT for progression toward indep amb with least vs no AD.           IP PT Goals       02/04/17 1521          Bed Mobility PT STG    Bed Mobility PT STG, Date Established 02/04/17  -SV      Bed Mobility PT STG, Time to Achieve 1 wk  -SV      Bed Mobility PT STG, Activity Type all bed mobility  -SV      Bed Mobility PT STG, [place] Level independent  -SV      Transfer Training PT STG    Transfer Training PT STG, Date Established 02/04/17  -SV      Transfer Training PT STG, Time to Achieve 1 wk  -SV      Transfer Training PT STG, Activity Type all transfers  -SV      Transfer Training PT STG, [place] Level independent   least vs no AD  -SV      Gait Training PT STG    Gait Training Goal PT STG, Date Established 02/04/17  -SV      Gait Training Goal PT STG, Time to Achieve 1 wk  -SV      Gait Training Goal PT STG, [place] Level independent  -SV      Gait Training Goal PT STG, Assist Device --   least vs no AD  -SV      Gait Training Goal PT STG, Distance to Achieve 300  -SV         User Key  (r) = Recorded By, (t) = Taken By, (c) = Cosigned By    Initials Name Provider Type    SV Zaria Marvin PT Physical Therapist                Outcome Measures       02/04/17 1500          How much help from another person do you currently need...    Turning from your back to your side while in flat bed without using bedrails? 4  -SV      Moving from lying on back to sitting on the side of a flat bed without bedrails? 4  -SV      Moving to and from a bed to a chair (including a wheelchair)? 3  -SV      Standing up from a chair using your arms (e.g., wheelchair, bedside chair)? 3  -SV      Climbing 3-5 steps with a railing? 3  -SV      To walk in hospital room? 3  -SV      AM-PAC 6 Clicks Score 20  -SV      Functional Assessment    Outcome Measure Options AM-PAC 6 Clicks Basic Mobility (PT)  -SV        User Key  (r) = Recorded By, (t) = Taken By, (c) = Cosigned By    Initials Name Provider Type    SV Zaria Marvin PT Physical Therapist           Time Calculation:         PT Charges       02/04/17 1524          Time Calculation    Start Time 1503  -SV      Stop Time 1520  -SV      Time Calculation (min) 17 min  -SV      PT Received On 02/04/17  -SV      PT - Next Appointment 02/05/17  -SV      PT Goal Re-Cert Due Date 02/11/17  -SV        User Key  (r) = Recorded By, (t) = Taken By, (c) = Cosigned By    Initials Name Provider Type     Zaria Marvin PT Physical Therapist          Therapy Charges for Today     Code Description Service Date Service Provider Modifiers Qty    14117698171 HC PT EVAL LOW COMPLEXITY 1 2/4/2017 Zaria Marvin, PT GP 1          PT G-Codes  Outcome Measure Options: AM-PAC 6 Clicks Basic Mobility (PT)      Zaria Marvin PT  2/4/2017

## 2017-02-04 NOTE — PROGRESS NOTES
Acute Care - Speech Language Pathology   Swallow Initial Evaluation [facility]     Patient Name: Neville Mendez  : 1950  MRN: [id number]  Today's Date: 2017               Admit Date: 2/3/2017    SPEECH-LANGUAGE PATHOLOGY EVALUATION - SWALLOW  Subjective: The patient was seen on this date for a Clinical Swallow evaluation.  Patient was alert and cooperative.    The patient's history is significant for chemo/XRT for laryngeal cancer per patient. Pt refuses hx of surgery. SLP observed hx of trach in chart, pt unable to confirm. Pt on pureed diet and thins at long term care facility. Pt reports not eating the puree. Per patient report, it appears the patient still relies on nocturnal tube feeds to maintain adequate nutrition. Of note, pt admitted d/t issues with PEG, R lobe pna found in ED. Pt with harsh voice.    Objective: Textures given included thin liquid and puree consistency.  Assessment: Difficulties were noted with thin liquid and puree consistency, characterized by multiple swallows, wet voice, cough, c/o obstruction, reduced laryngeal elevation.  SLP Findings:  Patient presents with severe pharyngeal dysphagia, without esophageal component.   SLP discussed results with patient in detail. Pt refused hx of speech therapy, but would then comment on swallow precautions he had learned in the past. Question if patient is a reliable informant. SLP recs temporary nutrition until VFSS. Will determined if replacing PEG warranted at that time.   Recommendations: Diet Textures: NPO, May have ice between meals after oral care, under staff or family supervision and with the recommended strategies for safe swallowing.   Other Recommended Evaluations: VFSS    Dysphagia therapy is recommended. Rationale: to improve swallow function.    Visit Dx:     ICD-10-CM ICD-9-CM   1. Pneumonia of right lower lobe due to infectious organism J18.9 483.8   2. Bleeding from g-tube R58 459.0   3. Difficulty walking R26.2 719.7      Patient Active Problem List   Diagnosis   • Pneumonia of right lower lobe due to infectious organism     Past Medical History   Diagnosis Date   • SCAR (acute kidney injury)    • Brain cancer      caancer head, neck and face   • COPD (chronic obstructive pulmonary disease)    • Coronary artery disease    • Dysphagia    • Hiatal hernia    • Hyperlipidemia    • Hypertension    • Malignant neoplasm of head    • Squamous cell carcinoma      of neck     Past Surgical History   Procedure Laterality Date   • Spine surgery     • Cardiac surgery            SWALLOW EVALUATION (last 72 hours)      Swallow Evaluation       02/04/17 1436                Rehab Evaluation    Document Type evaluation  -SA        Subjective Information agree to therapy  -SA        Patient Effort, Rehab Treatment good  -SA        Symptoms Noted During/After Treatment other (see comments)   increased coughing  -SA        General Information    Patient Profile Review yes  -SA        Subjective Patient Observations Pt with harsh voice, frequent cough  -SA        Pertinent History Of Current Problem Hx of dysphagia following head neck ca  -SA        Current Diet Limitations NPO  -SA        Prior Level of Function- Swallowing diet modifications- foods;compensations (maneuvers, postures)  -SA        Plans/Goals Discussed With patient  -SA        Barriers to Rehab previous functional deficit  -SA        Clinical Impression    Patient's Goals For Discharge return to regular diet  -SA        SLP Swallowing Diagnosis severe dysphagia  -SA        Rehab Potential/Prognosis, Swallowing fair, will monitor progress closely  -SA        Criteria for Skilled Therapeutic Interventions Met skilled criteria for dysphagia intervention met  -SA        FCM, Swallowing 1-->Level 1  -SA        Therapy Frequency PRN  -SA        Predicted Duration Therapy Interv (days) until discharge  -SA        SLP Diet Recommendation NPO: unsafe for food/liquid intake;temporary alternative  means of nutrition/hydration;long-term alternative means of nutrition/hydration  -        Recommended Diagnostics VFSS ([facility])  -        SLP Rec. for Method of Medication Administration meds via alternate route  -        Pain Assessment    Pain Assessment No/denies pain  -        Oral Motor Structure and Function    Oral Motor Assessment Comment Harsh voice  -          User Key  (r) = Recorded By, (t) = Taken By, (c) = Cosigned By    Initials Name Effective Dates     Dorothy Kathy Shelton 12/11/15 -         EDUCATION  The patient has been educated in the following areas:   NPO rationale.    SLP Recommendation and Plan  SLP Swallowing Diagnosis: severe dysphagia  SLP Diet Recommendation: NPO: unsafe for food/liquid intake, temporary alternative means of nutrition/hydration, long-term alternative means of nutrition/hydration     SLP Rec. for Method of Medication Administration: meds via alternate route     Recommended Diagnostics: VFSS ([facility])  Criteria for Skilled Therapeutic Interventions Met: skilled criteria for dysphagia intervention met     Rehab Potential/Prognosis, Swallowing: fair, will monitor progress closely  Therapy Frequency: PRN             Plan of Care Review  Plan Of Care Reviewed With: patient  Outcome Summary/Follow up Plan: Bedside swallow completed. Pt with significant difficulty with PO. SLP recs NPO, temporary cortrak until VFSS completed.           IP SLP Goals       02/04/17 1439          Begin to Take Some PO Safely    Begin to Take Some PO Safely- SLP, Date Established 02/04/17  -      Begin to Take Some PO Safely- SLP, Time to Achieve by discharge  -        User Key  (r) = Recorded By, (t) = Taken By, (c) = Cosigned By    Initials Name Provider Type     Dorothy Kathy Shelton Speech and Language Pathologist             SLP Outcome Measures (last 72 hours)      SLP Outcome Measures       02/04/17 1400          SLP Outcome Measures    Outcome Measure Used? Adult NOMS  -SA       FCM Scores    FCM Chosen Swallowing  -      Swallowing FCM Score 1  -        User Key  (r) = Recorded By, (t) = Taken By, (c) = Cosigned By    Initials Name Effective Dates    SA Dorothy Shelton 12/11/15 -            Time Calculation:         Time Calculation- SLP       02/04/17 1440          Time Calculation- SLP    SLP Received On 02/04/17  -        User Key  (r) = Recorded By, (t) = Taken By, (c) = Cosigned By    Initials Name Provider Type     Dorothy Shelton Speech and Language Pathologist          Therapy Charges for Today     Code Description Service Date Service Provider Modifiers Qty    [id number] HC ST EVAL ORAL PHARYNG SWALLOW 4 2/4/2017 Dorothy Shelton GN 1               Dorothy Shelton  2/4/2017

## 2017-02-04 NOTE — CONSULTS
"Adult Nutrition  Assessment/PES    Patient Name:  Neville Mendez  YOB: 1950  MRN: [id number]  Admit Date:  2/3/2017    Assessment Date:  2/4/2017  Assessment completed.  Pt received Jevity 1.2 at 85ml/hr from 7pm-7am with 200cc water q 4 hr    Speech eval pending. Spoke with family and pt who state tube will be removed or replaced. Will continue to follow for nutrition.   Reason for Assessment       02/04/17 1158    Reason for Assessment    Reason For Assessment/Visit physician consult;TF/PN    Identified At Risk By Screening Criteria tube feeding or parenteral nutrition    Diagnosis Diagnosis    Oncology Head/neck cancer   throat    Pulmonary/Critical Care Pneumonia    Other diagnosis leaking gube              Nutrition/Diet History       02/04/17 1201    Nutrition/Diet History    Typical Food/Fluid Intake also eats soft foods,soft drinks, etc at NH    Supplemental Drinks/Foods/Additives Jevity 1.2 at 85cc/hr 7pm-7am and 200 cc water q 4 hours while tf's running    Factors Affecting Nutritional Intake Factors    Other plan is to either replace gtube which is leaking or take out pending what speech says about swallow            Anthropometrics       02/04/17 1216    Anthropometrics    Height 177.8 cm (70\")    Weight 66.7 kg (147 lb)    Ideal Body Weight (IBW)    Ideal Body Weight (IBW), Male (kg) 76.48    % Ideal Body Weight 87.37    Body Mass Index (BMI)    BMI (kg/m2) 21.14    BMI Grade 19.1 - 24.9 - normal            Labs/Tests/Procedures/Meds       02/04/17 1217    Labs/Tests/Procedures/Meds    Diagnostic Test/Procedure Review reviewed    Labs/Tests Review Reviewed;Na+;Glucose;Hgb Hct;WBC    Procedure Review SLP    Swallow eval status Pending    Medication Review Reviewed, pertinent;Antibiotic;Multivitamin;Laxative    Significant Vitals reviewed            Physical Findings       02/04/17 1217    Physical Findings/Assessment    Additional Documentation Physical Appearance (Group)    Physical " Appearance    Tubes gastrostomy tube            Estimated/Assessed Needs       02/04/17 1217    Calculation Measurements    Weight Used For Calculations 66.7 kg (147 lb 0.8 oz)    Estimated/Assessed Energy Needs    Energy Need Method Kcal/kg    kcal/kg 30    30 Kcal/Kg (kcal) 2001    Estimated/Assessed Protein Needs    Weight Used for Protein Calculation 66.7 kg (147 lb 0.8 oz)    Protein (gm/kg) 1.0    1.0 Gm Protein (gm) 66.7    Estimated/Assessed Fluid Needs    Fluid Need Method RDA method    RDA Method (mL)  2000            Nutrition Prescription Ordered       02/04/17 1218    Nutrition Prescription PO    Current PO Diet NPO                Problem/Interventions:        Problem 1       02/04/17 1218    Nutrition Diagnoses Problem 1    Problem 1 Nutrition Appropriate for Condition at this Time    Etiology (related to) Medical Diagnosis    Oncology Head/neck cancer   leaking gtube                    Intervention Goal       02/04/17 1219    Intervention Goal    General Maintain nutrition;Nutrition support treatment    PO Initiate feeding    TF/PN Inititiate TF/PN    Weight Maintain weight            Nutrition Intervention       02/04/17 1219    Nutrition Intervention    RD/Tech Action Follow Tx progress;Care plan reviewd            Nutrition Prescription       02/04/17 1219    Nutrition Prescription EN    Enteral Prescription Enteral begin/change;Enteral to supply    Enteral Route Gastrostomy    Product Jevity 1.2 tony    TF Delivery Method Cyclic    Cyclic TF Goal Volume (mL) 85 mL    Cyclic TF Cycle Over (hrs)  7pm-7am    Water flush (mL)  200 mL    Water Flush Frequency Every 4 hours    EN to Supply    Kcal/Day 1224 Kcal/Day    Protein (gm/day) 56 gm/day    Meet Estimated Kcal Need (%) 61 %    Meet Estimated Protein Need (%) 84 %    TF Free H2O (mL) 823 mL    Total Free H2O (mL/day) 1423 mL/day            Education/Evaluation       02/04/17 1223    Education    Education Will Instruct as appropriate     Monitor/Evaluation    Monitor Per protocol        Comments:      Electronically signed by:  Rachna Walker RD  02/04/17 12:23 PM

## 2017-02-04 NOTE — PLAN OF CARE
Problem: Patient Care Overview (Adult)  Goal: Plan of Care Review  Outcome: Ongoing (interventions implemented as appropriate)    02/04/17 1522   Coping/Psychosocial Response Interventions   Plan Of Care Reviewed With patient   Outcome Evaluation   Outcome Summary/Follow up Plan Pt presents with impaired balance with transfers and amb due to general weakness. Pt desires to eventually return home as and indep amb without AD. Pt will benefit from cont skilled PT for progression toward indep amb with least vs no AD.

## 2017-02-04 NOTE — PROGRESS NOTES
Pharmacy to dose vancoycin per Dr. Willian Sandra's request  DX: PNA   ([facility])    Vancomycin  1500mg (20mg/kg) was given at 16:58..   Will start 1250mg (17mg/kg) q 12hr  Starting at 2am....   Ordered trough for 2/5 @ 1am.    66 y.o.147 lb (66.7 kg)Estimated Creatinine Clearance: 83.4 mL/min (by C-G formula based on Cr of 0.61).    Results from last 7 days  Lab Units 02/03/17  1651   WBC 10*3/mm3 11.78*       Results from last 7 days  Lab Units 02/03/17  1651   CREATININE mg/dL 0.61*     Estimated Creatinine Clearance: 83.4 mL/min (by C-G formula based on Cr of 0.61).  Temp Readings from Last 3 Encounters:   02/03/17 98.3 °F (36.8 °C) (Oral)   01/23/17 98.1 °F (36.7 °C) (Oral)     Felix Still, Pharm.D., RPh, BCPS

## 2017-02-04 NOTE — PROGRESS NOTES
"DAILY PROGRESS NOTE  [facility]    Patient Identification:  Name: Neville Mendez  Age: 66 y.o.  Sex: male  :  1950  MRN: [id number]         Primary Care Physician: No Known Provider      Subjective  No new c/o.  Minimal if any pulm c/o.      Objective:  General Appearance:  Comfortable, in no acute distress and not in pain (Thin, chronicly ill appearing. ).    Vital signs: (most recent): Blood pressure 119/57, pulse 73, temperature 98.5 °F (36.9 °C), temperature source Oral, resp. rate 16, height 70\" (177.8 cm), weight 147 lb (66.7 kg), SpO2 98 %.    Lungs:  Normal respiratory rate and normal effort.  He is not in respiratory distress.  No stridor.  There are rhonchi (Occational) and decreased breath sounds (Dull rt base, lateral. ).  No wheezes or rales.    Heart: Normal rate.  Regular rhythm.  S1 normal.    Abdomen: Abdomen is soft and non-distended.  (G tube in place. No active bleeding from sight. )Bowel sounds are normal.     Extremities: There is no dependent edema.    Neurological: Patient is alert and oriented to person, place and time.    Skin:  Warm and dry.                Vital signs in last 24 hours:  Temp:  [98.3 °F (36.8 °C)-98.5 °F (36.9 °C)] 98.5 °F (36.9 °C)  Heart Rate:  [63-83] 73  Resp:  [16-18] 16  BP: (111-136)/(57-82) 119/57    Intake/Output:    Intake/Output Summary (Last 24 hours) at 17 1553  Last data filed at 17 2100   Gross per 24 hour   Intake      0 ml   Output    135 ml   Net   -135 ml         Exam:    Physical Exam   Cardiovascular: Normal rate, regular rhythm and S1 normal.    Pulmonary/Chest: Effort normal. No stridor. No respiratory distress. He has decreased breath sounds (Dull rt base, lateral. ). He has no wheezes. He has rhonchi (Occational). He has no rales.   Abdominal: Soft. Bowel sounds are normal. He exhibits no distension.   Neurological: He is alert.   Skin: Skin is warm and dry.         Results from last 7 days  Lab Units " 02/04/17  0411 02/03/17  1651   WBC 10*3/mm3 10.92* 11.78*   HEMOGLOBIN g/dL 9.9* 11.1*   PLATELETS 10*3/mm3 467 558*     Results from last 7 days  Lab Units 02/04/17  0411 02/03/17  1651   SODIUM mmol/L 136 132*   POTASSIUM mmol/L 4.1 3.9   CHLORIDE mmol/L 97* 89*   TOTAL CO2 mmol/L 26.4 32.5*   BUN mg/dL 9 13   CREATININE mg/dL 0.55* 0.61*   GLUCOSE mg/dL 125* 135*   Estimated Creatinine Clearance: 83.4 mL/min (by C-G formula based on Cr of 0.55).  Results from last 7 days  Lab Units 02/04/17  0411 02/03/17  1651   CALCIUM mg/dL 8.4* 9.7   ALBUMIN g/dL  --  3.50     Results from last 7 days  Lab Units 02/03/17  1651   ALBUMIN g/dL 3.50   BILIRUBIN mg/dL 0.6   ALK PHOS U/L 84   AST (SGOT) U/L 16   ALT (SGPT) U/L 13       Assessment:  Pulm infiltrate - Pneumonia. Consider underlying mass, fluid:  Reported G tube bleeding/leakage:  Hyponatremia:  Resolved.   Hyperglycemia:  Dysphagia -severe:  NPO  CAD:  COPD:  Anemia:  Head and neck CA:      Plan:  Resume G feedings when OK with GI. Check CT of chest. Please see orders.     Robin Corcoran MD  2/4/2017  3:53 PM

## 2017-02-04 NOTE — H&P
Patient Identification:  Name: Neville Mendez  Age/Sex: 66 y.o. male  :  1950  MRN: [id number]         Primary Care Physician: No Known Provider    Chief Complaint   Patient presents with   • Tube Change       Subjective   Patient is a 66 y.o. male with a history of head and neck cancer s/p surgical excision and chemotherapy and G tube placement who presents from [facility] stating that he wanted his g tube to be taken out. Patient is a poor historian when it comes to his medical history but according to the ED staff he was sent here because they noticed some bleeding around his G tube site. Patient states he cannot remember who or where or why he had the G tube placed. He states that he eats a pureed diet at the NH and gets nocturnal tube feedings. He states that the tube is bothering him and he wants it taken out. He denies any dysphagia. He does have minimal cough. No fevers or chills. States he used to be on thickened liquids but was told he could do a pureed diet. He was found to have RLL pneumonia in the ED.     History of Present Illness    Past Medical History   Diagnosis Date   • SCAR (acute kidney injury)    • Brain cancer      caancer head, neck and face   • COPD (chronic obstructive pulmonary disease)    • Coronary artery disease    • Dysphagia    • Hiatal hernia    • Hyperlipidemia    • Hypertension    • Malignant neoplasm of head    • Squamous cell carcinoma      of neck     Past Surgical History   Procedure Laterality Date   • Spine surgery     • Cardiac surgery       History reviewed. No pertinent family history.  Social History   Substance Use Topics   • Smoking status: Former Smoker   • Smokeless tobacco: None      Comment: stopped about a year ago   • Alcohol use No       (Not in a hospital admission)  Allergies:  Review of patient's allergies indicates no known allergies.    Review of Systems   Constitutional: Negative.    HENT: Negative.    Eyes: Negative.    Respiratory:  Positive for cough. Negative for choking and shortness of breath.    Cardiovascular: Negative.    Gastrointestinal: Negative.         Blood around G tube site   Endocrine: Negative.    Genitourinary: Negative.    Musculoskeletal: Negative.    Skin: Negative.    Neurological: Negative.    Hematological: Negative.    Psychiatric/Behavioral: Negative.         Objective    Vital Signs  Temp:  [99.1 °F (37.3 °C)] 99.1 °F (37.3 °C)  Heart Rate:  [63-92] 63  Resp:  [18] 18  BP: (111-136)/(58-93) 130/66  Body mass index is 22.96 kg/(m^2).    Physical Exam   Constitutional: He is oriented to person, place, and time. He appears well-developed and well-nourished.   HENT:   Head: Normocephalic and atraumatic.   Mouth/Throat: No oropharyngeal exudate.   Eyes: Conjunctivae are normal. No scleral icterus.   Neck: Normal range of motion. No JVD present. No tracheal deviation present.   Cardiovascular: Normal rate and regular rhythm.    No murmur heard.  Pulmonary/Chest: Effort normal.   Crackles at right base   Abdominal: Soft. Bowel sounds are normal. He exhibits no distension. There is no tenderness.   G tube in place with dried dark blood around site of insertion into skin   Musculoskeletal: Normal range of motion. He exhibits no edema.   Neurological: He is alert and oriented to person, place, and time.   Skin: Skin is warm and dry.   Psychiatric: He has a normal mood and affect. His behavior is normal. Judgment and thought content normal.       Results Review:   I reviewed the patient's new clinical results.  Imaging Results (last 24 hours)     Procedure Component Value Units Date/Time    XR Chest 1 View [[id number]] Collected:  02/03/17 1627     Updated:  02/03/17 1630    Narrative:       EMERGENCY CHEST SINGLE VIEW     HISTORY: 66-year-old male with cough     COMPARISON: 12/08/2014     FINDINGS:  1. Acute right base pneumonia.  2. No other change.  3. Recommend follow-up to confirm resolution     This report was finalized on  2/3/2017 4:27 PM by Dr. Willy Aden MD.             Assessment/Plan     Active Problems:    Pneumonia of right lower lobe due to infectious organism      Assessment & Plan  1. RLL Pneumonia  - likely aspiration  - leukocytosis but does not meet sepsis criteria  - treating with Vanc and Zosyn as he is from nursing facility  - check MRSA culture, if negative consider discontinuing Vanc  - check strep and legionella urinary antigens  - swallow study, NPO until then    2. G tube bleeding  - Will get GI to come see him  - may need G tube study to look for any leaks or if he passes swallow study may be able to have it removed completely  - will get nutrition assessment for now until final decision on whether to remove it or leave it in place  - monitor H/H, doesn't appear to have any major bleeding when I saw it  - will place on maintenance fluid for now    3. Hyponatremia  - unclear if he was getting free water with tube feeds or not  - will give D5NS, recheck in am    4. Hyperglycemia  - check A1c        I discussed the patients findings and my recommendations with patient.          Willian Burgess MD  [facility]  02/03/17  7:15 PM

## 2017-02-05 LAB
ANION GAP SERPL CALCULATED.3IONS-SCNC: 16.2 MMOL/L
BASOPHILS # BLD AUTO: 0.02 10*3/MM3 (ref 0–0.2)
BASOPHILS NFR BLD AUTO: 0.2 % (ref 0–1.5)
BUN BLD-MCNC: 6 MG/DL (ref 8–23)
BUN/CREAT SERPL: 12 (ref 7–25)
CALCIUM SPEC-SCNC: 8.5 MG/DL (ref 8.6–10.5)
CHLORIDE SERPL-SCNC: 97 MMOL/L (ref 98–107)
CO2 SERPL-SCNC: 22.8 MMOL/L (ref 22–29)
CREAT BLD-MCNC: 0.5 MG/DL (ref 0.76–1.27)
DEPRECATED RDW RBC AUTO: 46.5 FL (ref 37–54)
EOSINOPHIL # BLD AUTO: 0.27 10*3/MM3 (ref 0–0.7)
EOSINOPHIL NFR BLD AUTO: 2.5 % (ref 0.3–6.2)
ERYTHROCYTE [DISTWIDTH] IN BLOOD BY AUTOMATED COUNT: 14.5 % (ref 11.5–14.5)
GFR SERPL CREATININE-BSD FRML MDRD: >150 ML/MIN/1.73
GLUCOSE BLD-MCNC: 84 MG/DL (ref 65–99)
HCT VFR BLD AUTO: 30 % (ref 40.4–52.2)
HGB BLD-MCNC: 9.6 G/DL (ref 13.7–17.6)
IMM GRANULOCYTES # BLD: 0.04 10*3/MM3 (ref 0–0.03)
IMM GRANULOCYTES NFR BLD: 0.4 % (ref 0–0.5)
LYMPHOCYTES # BLD AUTO: 0.96 10*3/MM3 (ref 0.9–4.8)
LYMPHOCYTES NFR BLD AUTO: 8.9 % (ref 19.6–45.3)
MCH RBC QN AUTO: 27.8 PG (ref 27–32.7)
MCHC RBC AUTO-ENTMCNC: 32 G/DL (ref 32.6–36.4)
MCV RBC AUTO: 87 FL (ref 79.8–96.2)
MONOCYTES # BLD AUTO: 0.73 10*3/MM3 (ref 0.2–1.2)
MONOCYTES NFR BLD AUTO: 6.8 % (ref 5–12)
NEUTROPHILS # BLD AUTO: 8.74 10*3/MM3 (ref 1.9–8.1)
NEUTROPHILS NFR BLD AUTO: 81.2 % (ref 42.7–76)
PLATELET # BLD AUTO: 424 10*3/MM3 (ref 140–500)
PMV BLD AUTO: 9.1 FL (ref 6–12)
POTASSIUM BLD-SCNC: 3.2 MMOL/L (ref 3.5–5.2)
PROCALCITONIN SERPL-MCNC: 0.08 NG/ML (ref 0.1–0.25)
RBC # BLD AUTO: 3.45 10*6/MM3 (ref 4.6–6)
SODIUM BLD-SCNC: 136 MMOL/L (ref 136–145)
VANCOMYCIN TROUGH SERPL-MCNC: 11.2 MCG/ML (ref 5–20)
WBC NRBC COR # BLD: 10.76 10*3/MM3 (ref 4.5–10.7)

## 2017-02-05 PROCEDURE — 25010000002 PIPERACILLIN SOD-TAZOBACTAM PER 1 G: Performed by: INTERNAL MEDICINE

## 2017-02-05 PROCEDURE — 84145 PROCALCITONIN (PCT): CPT | Performed by: HOSPITALIST

## 2017-02-05 PROCEDURE — 80202 ASSAY OF VANCOMYCIN: CPT | Performed by: HOSPITALIST

## 2017-02-05 PROCEDURE — 99231 SBSQ HOSP IP/OBS SF/LOW 25: CPT | Performed by: INTERNAL MEDICINE

## 2017-02-05 PROCEDURE — 97110 THERAPEUTIC EXERCISES: CPT

## 2017-02-05 PROCEDURE — 80048 BASIC METABOLIC PNL TOTAL CA: CPT | Performed by: HOSPITALIST

## 2017-02-05 PROCEDURE — 85025 COMPLETE CBC W/AUTO DIFF WBC: CPT | Performed by: HOSPITALIST

## 2017-02-05 PROCEDURE — 25010000002 VANCOMYCIN: Performed by: HOSPITALIST

## 2017-02-05 PROCEDURE — 84132 ASSAY OF SERUM POTASSIUM: CPT | Performed by: HOSPITALIST

## 2017-02-05 PROCEDURE — 25010000003 POTASSIUM CHLORIDE 10 MEQ/100ML SOLUTION: Performed by: HOSPITALIST

## 2017-02-05 PROCEDURE — 25810000003 DEXTROSE-NACL PER 500 ML: Performed by: INTERNAL MEDICINE

## 2017-02-05 RX ORDER — POTASSIUM CHLORIDE 7.45 MG/ML
10 INJECTION INTRAVENOUS
Status: DISCONTINUED | OUTPATIENT
Start: 2017-02-05 | End: 2017-02-05

## 2017-02-05 RX ORDER — POTASSIUM CHLORIDE 750 MG/1
40 CAPSULE, EXTENDED RELEASE ORAL AS NEEDED
Status: DISCONTINUED | OUTPATIENT
Start: 2017-02-05 | End: 2017-02-05

## 2017-02-05 RX ORDER — POTASSIUM CHLORIDE 1.5 G/1.77G
40 POWDER, FOR SOLUTION ORAL AS NEEDED
Status: DISCONTINUED | OUTPATIENT
Start: 2017-02-05 | End: 2017-02-05

## 2017-02-05 RX ORDER — ALBUTEROL SULFATE 2.5 MG/3ML
2.5 SOLUTION RESPIRATORY (INHALATION) EVERY 4 HOURS PRN
Status: DISCONTINUED | OUTPATIENT
Start: 2017-02-05 | End: 2017-02-09 | Stop reason: HOSPADM

## 2017-02-05 RX ORDER — POTASSIUM CHLORIDE 1.5 G/1.77G
40 POWDER, FOR SOLUTION ORAL AS NEEDED
Status: DISCONTINUED | OUTPATIENT
Start: 2017-02-05 | End: 2017-02-09 | Stop reason: HOSPADM

## 2017-02-05 RX ORDER — POTASSIUM CHLORIDE 7.45 MG/ML
10 INJECTION INTRAVENOUS
Status: DISCONTINUED | OUTPATIENT
Start: 2017-02-05 | End: 2017-02-09 | Stop reason: HOSPADM

## 2017-02-05 RX ADMIN — POTASSIUM CHLORIDE 10 MEQ: 7.46 INJECTION, SOLUTION INTRAVENOUS at 18:07

## 2017-02-05 RX ADMIN — HYDROCODONE BITARTRATE AND ACETAMINOPHEN 1 TABLET: 5; 325 TABLET ORAL at 14:10

## 2017-02-05 RX ADMIN — POTASSIUM CHLORIDE 10 MEQ: 7.46 INJECTION, SOLUTION INTRAVENOUS at 16:59

## 2017-02-05 RX ADMIN — CALCIUM POLYCARBOPHIL 625 MG: 625 TABLET ORAL at 18:15

## 2017-02-05 RX ADMIN — TAZOBACTAM SODIUM AND PIPERACILLIN SODIUM 3.38 G: 375; 3 INJECTION, SOLUTION INTRAVENOUS at 11:09

## 2017-02-05 RX ADMIN — VANCOMYCIN HYDROCHLORIDE 1250 MG: 1 INJECTION, POWDER, LYOPHILIZED, FOR SOLUTION INTRAVENOUS at 06:19

## 2017-02-05 RX ADMIN — MIRTAZAPINE 7.5 MG: 15 TABLET, FILM COATED ORAL at 20:25

## 2017-02-05 RX ADMIN — POTASSIUM CHLORIDE 10 MEQ: 7.46 INJECTION, SOLUTION INTRAVENOUS at 14:00

## 2017-02-05 RX ADMIN — VANCOMYCIN HYDROCHLORIDE 1250 MG: 1 INJECTION, POWDER, LYOPHILIZED, FOR SOLUTION INTRAVENOUS at 17:05

## 2017-02-05 RX ADMIN — DEXTROSE AND SODIUM CHLORIDE 75 ML/HR: 5; 900 INJECTION, SOLUTION INTRAVENOUS at 00:45

## 2017-02-05 RX ADMIN — TAZOBACTAM SODIUM AND PIPERACILLIN SODIUM 3.38 G: 375; 3 INJECTION, SOLUTION INTRAVENOUS at 18:15

## 2017-02-05 RX ADMIN — POTASSIUM CHLORIDE 10 MEQ: 7.46 INJECTION, SOLUTION INTRAVENOUS at 13:51

## 2017-02-05 RX ADMIN — CALCIUM CARBONATE 1250 MG: 1250 SUSPENSION ORAL at 18:15

## 2017-02-05 RX ADMIN — TAZOBACTAM SODIUM AND PIPERACILLIN SODIUM 3.38 G: 375; 3 INJECTION, SOLUTION INTRAVENOUS at 02:40

## 2017-02-05 NOTE — PROGRESS NOTES
"Pharmacokinetic Consult - Vancomycin Dosing (Follow-up Note)    Neville Mendez is a 66 y.o. male who is on full day 2 pharmacy to dose vancomycin for PNA ([facility] patient)  Pharmacy dosing vancomycin per Dr. Burgess's request.   Other antimicrobials: Zosyn 3.375 gram IV q8h  Goal trough: 15-20 mg/L    Current Vancomycin dose: 1250mg IV q12h (~19mg/kg)    Dr. Corcoran noted 2/5: Pulm infiltrate - Pneumonia &/o mass: Pulm consult.     Relevant clinical data and objective history reviewed:  70\" (177.8 cm)  147 lb (66.7 kg)  Body mass index is 21.09 kg/(m^2).     He has a past medical history of SCAR (acute kidney injury); Brain cancer; COPD (chronic obstructive pulmonary disease); Coronary artery disease; Dysphagia; Hiatal hernia; Hyperlipidemia; Hypertension; Malignant neoplasm of head; and Squamous cell carcinoma.    Allergies as of 02/03/2017   • (No Known Allergies)     Vital Signs (last 24 hours)       02/04 0700  -  02/05 0659 02/05 0700  -  02/05 1241   Most Recent    Temp (°F) 98 -  98.5      97.9     97.9 (36.6)    Heart Rate 68 -  90      88     88    Resp 16 -  18      18     18    /59 -  135/73      134/89     134/89    SpO2 (%) 95 -  98      98     98        Estimated Creatinine Clearance: 83.4 mL/min (by C-G formula based on Cr of 0.5).    Results from last 7 days  Lab Units 02/05/17  0516 02/04/17  0411 02/03/17  1651   CREATININE mg/dL 0.50* 0.55* 0.61*       Results from last 7 days  Lab Units 02/05/17  0515 02/04/17  0411 02/03/17  1651   WBC 10*3/mm3 10.76* 10.92* 11.78*       Baseline culture/source/susceptibility:     MRSA Screen [[id number]] (Normal) Collected: 02/04/17 0027       Lab Status: Preliminary result Specimen: Swab from Nares Updated: 02/05/17 0855        MRSA SCREEN CX           No Methicillin Resistant Staphylococcus aureus Isolated at 24 hours       S. Pneumo Ag Urine or CSF [[id number]] (Normal) Collected: 02/03/17 2036       Lab Status: Final result Specimen: Urine from Urine, Clean " Catch Updated: 17 0211        Strep Pneumo Ag Negative        Respiratory Culture [[id number]]        Lab Status: No result Specimen: Sputum from Cough        Blood Culture [[id number]] (Normal) Collected: 17       Lab Status: Preliminary result Specimen: Blood from Arm, Left Updated: 17 180        Blood Culture No growth at 24 hours        Blood Culture [[id number]] (Normal) Collected: 17       Lab Status: Preliminary result Specimen: Blood from Arm, Right Updated: 17        Blood Culture No growth at 24 hours            Imagin/3 CXR FINDINGS:  1. Acute right base pneumonia.  2. No other change.  3. Recommend follow-up to confirm resolution     CT chest w/o contrast  Impression:         1. The large dense masslike airspace consolidations within the right  middle lobe and right lower lobe are of uncertain etiology. Extensive  pneumonia is possible and there may be some chronicity given the  adjacent pleural thickening. However, a malignancy cannot be excluded.  There is right hilar fullness and subcarinal lymphadenopathy as  described. The lymphadenopathy may be reactive, but may be metastatic as  well.  2. Reticular nodular opacity within the lingula is suspected to  represent an acute infectious infiltrate.  3. Small volume of mucus/secretions are noted within the dependent  aspect of the trachea.     Labs:  2/3 A1 5.37  2/3 Lactate 1.5  2/3 PCT 0.12 -> 0.08 ()    98% O2 on Room air, RR 16-18    Lab Results   Component Value Date    [facility] 11.20 2017     Assessment/Plan  1) Vancomycin trough level prior to 4th overall dose 11.2 mcg/mL.  Target 15-20 mcg/mL.  2) Preceding doses given at variable times, 1500mg loading dose  from 1250mg q12h dose by ~ 7h, with next dose given ~15.5h later (~3.5h later).  Trough result may be affected by off schedule doses.  As such, will recheck trough level before AM dose which should be 4th consistent dose on  schedule (6th overall dose)  2) Will monitor serum creatinine at least every 48 hours per dosing recommendations.    3) Encourage hydration as allowed by MD to help prevent toxic accumulation; monitor for s/sxn of toxicity including increase in SCr and decrease in UOP.    Pharmacy will continue to follow daily while on vancomycin and adjust as needed.     Thanks, Shaq Prieto, PharmD, BCPS

## 2017-02-05 NOTE — PROGRESS NOTES
[facility]  Inpatient Progress Note    Reason for Follow Up:  G tube malfunction    Subjective     Interval History:   No new issues.  No obvious leakage from around G tube site.  He has not been started back on TFs.    Current Facility-Administered Medications:   •  albuterol (PROVENTIL) nebulizer solution 0.083% 2.5 mg/3mL, 2.5 mg, Nebulization, Q4H PRN, Willian Burgess MD  •  aspirin chewable tablet 81 mg, 81 mg, Per G Tube, Daily, Willian Burgess MD, 81 mg at 02/03/17 2213  •  calcium carbonate 1250 (500 CA) MG/5ML suspension 1,250 mg, 1,250 mg, Per G Tube, BID With Meals, Willian Burgess MD  •  dextrose 5 % and sodium chloride 0.9 % infusion, 75 mL/hr, Intravenous, Continuous, Willian Burgess MD, Last Rate: 75 mL/hr at 02/05/17 0045, 75 mL/hr at 02/05/17 0045  •  HYDROcodone-acetaminophen (NORCO) 5-325 MG per tablet 1 tablet, 1 tablet, Oral, Q4H PRN, Willian Burgess MD  •  lisinopril (PRINIVIL,ZESTRIL) tablet 5 mg, 5 mg, Per G Tube, Q24H, Willian Burgess MD, 5 mg at 02/04/17 1931  •  mirtazapine (REMERON) tablet 7.5 mg, 7.5 mg, Per G Tube, Nightly, Willian Burgess MD  •  multivitamin (THERAGRAN) tablet 1 tablet, 1 tablet, Per G Tube, Daily, Willian Burgess MD, 1 tablet at 02/04/17 1931  •  Pharmacy to dose vancomycin, , Does not apply, Continuous PRN, Willian Burgess MD  •  piperacillin-tazobactam (ZOSYN) 3.375 g in dextrose 50 mL IVPB (premix), 3.375 g, Intravenous, Q8H, Willian Burgess MD, 3.375 g at 02/05/17 1109  •  polycarbophil (FIBERCON) tablet 625 mg, 625 mg, Per G Tube, BID, Willian Burgess MD  •  [DISCONTINUED] potassium chloride (MICRO-K) CR capsule 40 mEq, 40 mEq, Oral, PRN **OR** potassium chloride (KLOR-CON) packet 40 mEq, 40 mEq, Per G Tube, PRN **OR** potassium chloride 10 mEq in 100 mL IVPB, 10 mEq, Intravenous, Q1H PRN, Robin Corcoran MD  •  sodium chloride 0.9 % flush 1-10 mL, 1-10 mL,  Intravenous, PRN, Willian Burgess MD  •  Insert peripheral IV, , , Once **AND** sodium chloride 0.9 % flush 10 mL, 10 mL, Intravenous, PRN, Anil Ho MD  •  vancomycin 1250 mg/250 mL 0.9% NS IVPB ([facility]), 1,250 mg, Intravenous, Q12H, Robin Corcoran MD, 1,250 mg at 02/05/17 0619  •  vitamin C (ASCORBIC ACID) tablet 500 mg, 500 mg, Per G Tube, Daily, Willian Burgess MD, 500 mg at 02/04/17 1931  •  zolpidem (AMBIEN) tablet 2.5 mg, 2.5 mg, Oral, Nightly PRN, Robin Corcoran MD     Review of Systems:   + cough    Objective     Vital Signs  Temp:  [97.9 °F (36.6 °C)-98.2 °F (36.8 °C)] 97.9 °F (36.6 °C)  Heart Rate:  [68-90] 88  Resp:  [16-18] 18  BP: (126-135)/(73-89) 134/89  Body mass index is 21.09 kg/(m^2).    Intake/Output Summary (Last 24 hours) at 02/05/17 1333  Last data filed at 02/05/17 1109   Gross per 24 hour   Intake     50 ml   Output    250 ml   Net   -200 ml     I/O this shift:  In: 50 [IV Piggyback:50]  Out: -        Physical Exam:   General: patient awake, alert and cooperative   Eyes: Normal lids and lashes, no scleral icterus   Neck: supple, normal ROM   Skin: warm and dry, not jaundiced   Cardiovascular: regular rhythm and rate, no murmurs auscultated   Pulm: clear to auscultation bilaterally, regular and unlabored   Abdomen: Normal bowel sounds, no masses, no organomegaly, soft non-tender, non-distended, no guarding, no rebound Tenderness. PEG tube is in realitvely good shape. There is tape at area of tube connector. There is some stomal granulation tissue, and a small area of irritated stomal tissue at base. No blood or discharge noted.  Rectal: deferred   Extremities: no rash or edema   Psychiatric: Normal mood and behavior; memory intact     Results Review:     I reviewed the patient's new clinical results.  I reviewed the patient's new imaging results and agree with the interpretation.      Results from last 7 days  Lab Units 02/05/17  0515 02/04/17  0411 02/03/17  3770    WBC 10*3/mm3 10.76* 10.92* 11.78*   HEMOGLOBIN g/dL 9.6* 9.9* 11.1*   HEMATOCRIT % 30.0* 31.4* 35.1*   PLATELETS 10*3/mm3 424 467 558*         Results from last 7 days  Lab Units 02/05/17  0516 02/04/17  0411 02/03/17  1651   SODIUM mmol/L 136 136 132*   POTASSIUM mmol/L 3.2* 4.1 3.9   CHLORIDE mmol/L 97* 97* 89*   TOTAL CO2 mmol/L 22.8 26.4 32.5*   BUN mg/dL 6* 9 13   CREATININE mg/dL 0.50* 0.55* 0.61*   CALCIUM mg/dL 8.5* 8.4* 9.7   BILIRUBIN mg/dL  --   --  0.6   ALK PHOS U/L  --   --  84   ALT (SGPT) U/L  --   --  13   AST (SGOT) U/L  --   --  16   GLUCOSE mg/dL 84 125* 135*         Results from last 7 days  Lab Units 02/03/17  1651   INR  1.19*       No results found for: LIPASE    Radiology:    Imaging Results (last 24 hours)     Procedure Component Value Units Date/Time    CT Chest Without Contrast [[id number]] Collected:  02/04/17 1821     Updated:  02/04/17 1821    Narrative:       CT CHEST WITHOUT CONTRAST     HISTORY: 66-year-old male with an abnormal chest radiograph. The history  states the patient is asymptomatic. Previous history of head and neck  cancer. Tracheostomy.     TECHNIQUE: 3 mm images were obtained through the chest without the  administration of IV contrast. Compared with chest radiograph performed  yesterday.     FINDINGS: There is a large dense masslike airspace consolidation  completely opacifying the lateral segment of the right middle lobe.  There is also a masslike airspace consolidation at the posterior aspect  of the right lower lobe. There is lateral pleural thickening between the  right middle lobe and the right lower lobe airspace consolidations and  there is a minimal right pleural effusion. There is a reticular nodular  opacity within the lingula and there is mild atelectatic change at the  left lung base. There is fullness of the right hilum, but the  noncontrasted technique limits characterization and visualization of a  discrete node. There is an enlarged subcarinal node  measuring 2.6 x 2.0  cm. Small volume of mucus/secretions are noted within the dependent  aspect of the trachea. Percutaneous G-tube is noted in the visualized  upper abdomen.       Impression:       1. The large dense masslike airspace consolidations within the right  middle lobe and right lower lobe are of uncertain etiology. Extensive  pneumonia is possible and there may be some chronicity given the  adjacent pleural thickening. However, a malignancy cannot be excluded.  There is right hilar fullness and subcarinal lymphadenopathy as  described. The lymphadenopathy may be reactive, but may be metastatic as  well.  2. Reticular nodular opacity within the lingula is suspected to  represent an acute infectious infiltrate.  3. Small volume of mucus/secretions are noted within the dependent  aspect of the trachea.             Assessment/Plan     Patient Active Problem List   Diagnosis Code   • Pneumonia of right lower lobe due to infectious organism J18.9     Active Problems:  Pneumonia of right lower lobe due to infectious organism  ? Pulmonary mass  Dysphagia with history of G tube     Recommendations:  I suspect based on the patient's preliminary bedside swallow that he will likely continue to require nocturnal tube feedings. We will await the results of his VFSS. The G-tube itself appears to be in relatively good shape probably get a new connector to place on the distal end. There is some irritation of the stomal area which we will treat with some barrier cream.  We do not stock a vertical Kain but I may be able to order this tomorrow. I do not see a clear indication to replace this tube at this time, particularly given his pulmonary issues. Tube feeds can certainly be resumed for now while we await speech pathology evaluation.      David Campbell MD  02/05/17  1:33 PM

## 2017-02-05 NOTE — PLAN OF CARE
Problem: Patient Care Overview (Adult)  Goal: Plan of Care Review  Outcome: Ongoing (interventions implemented as appropriate)    02/04/17 8728   Outcome Evaluation   Outcome Summary/Follow up Plan aao x 4, remains npo, will have barium swallow study tomorrow, gi waiting for results of swallow study to decide what to do about g-tube       Goal: Adult Individualization and Mutuality  Outcome: Ongoing (interventions implemented as appropriate)  Goal: Discharge Needs Assessment  Outcome: Ongoing (interventions implemented as appropriate)    Problem: Fall Risk (Adult)  Goal: Identify Related Risk Factors and Signs and Symptoms  Outcome: Ongoing (interventions implemented as appropriate)  Goal: Absence of Falls  Outcome: Ongoing (interventions implemented as appropriate)    Problem: Pneumonia (Adult)  Goal: Signs and Symptoms of Listed Potential Problems Will be Absent or Manageable (Pneumonia)  Outcome: Ongoing (interventions implemented as appropriate)

## 2017-02-05 NOTE — PROGRESS NOTES
Acute Care - Physical Therapy Treatment Note  [facility]     Patient Name: Neville Mendez  : 1950  MRN: [id number]  Today's Date: 2017  Onset of Illness/Injury or Date of Surgery Date: 17  Date of Referral to PT: 17  Referring Physician: Nilo    Admit Date: 2/3/2017    Visit Dx:    ICD-10-CM ICD-9-CM   1. Pneumonia of right lower lobe due to infectious organism J18.9 483.8   2. Bleeding from g-tube R58 459.0   3. Difficulty walking R26.2 719.7     Patient Active Problem List   Diagnosis   • Pneumonia of right lower lobe due to infectious organism               Adult Rehabilitation Note       17 1000          Rehab Assessment/Intervention    Discipline physical therapy assistant  -      Document Type therapy note (daily note)  -      Subjective Information no complaints  -RH      Symptoms Noted During/After Treatment none  -RH      Precautions/Limitations, Vision WFL  -RH      Precautions/Limitations, Hearing WFL  -RH      Recorded by [] Jimy West PTA      Vital Signs    Post SpO2 (%) 98  -RH      O2 Delivery Post Treatment room air  -RH      Recorded by [RH] Jimy West PTA      Pain Assessment    Pain Assessment No/denies pain  -RH      Recorded by [] Jimy West PTA      Cognitive Assessment/Intervention    Current Cognitive/Communication Assessment functional  -RH      Orientation Status oriented x 4  -RH      Follows Commands/Answers Questions 100% of the time;needs cueing  -RH      Personal Safety WNL/WFL  -RH      Personal Safety Interventions nonskid shoes/slippers when out of bed  -RH      Recorded by [RH] Jimy West PTA      ROM (Range of Motion)    General ROM no range of motion deficits identified  -RH      Recorded by [RH] Jimy West PTA      Bed Mobility, Assessment/Treatment    Bed Mob, Supine to Sit, [place] conditional independence  -RH      Bed Mob, Sit to Supine, [place] conditional independence  -RH       Recorded by [RH] Jimy West PTA      Transfer Assessment/Treatment    Transfers, Sit-Stand [place] conditional independence  -RH      Transfers, Stand-Sit [place] conditional independence  -RH      Transfers, Sit-Stand-Sit, Assist Device rolling walker  -RH      Recorded by [] Jimy West PTA      Gait Assessment/Treatment    Gait, [place] Level stand by assist  -RH      Gait, Assistive Device rolling walker  -RH      Gait, Distance (Feet) 200  -RH      Gait, Gait Deviations lissa decreased;forward flexed posture  -RH      Gait, Impairments strength decreased;impaired balance  -RH      Recorded by [] Jimy West PTA      Functional Mobility    Functional Mobility- Ind. Level supervision required;contact guard assist  -RH      Recorded by [] Jimy West PTA      Balance Skills Training    Sitting-Level of Assistance Independent  -RH      Sitting-Balance Support Feet supported  -RH      Standing-Level of Assistance Close supervision  -RH      Static Standing Balance Support assistive device  -RH      Recorded by [] Jimy West PTA      Positioning and Restraints    Pre-Treatment Position in bed  -RH      Post Treatment Position bed  -RH      In Bed sitting EOB;call light within reach;exit alarm on  -RH      Recorded by [] Jimy West PTA        User Key  (r) = Recorded By, (t) = Taken By, (c) = Cosigned By    Initials Name Effective Dates     Jimy West PTA 02/18/16 -                 IP PT Goals       02/04/17 1521          Bed Mobility PT STG    Bed Mobility PT STG, Date Established 02/04/17  -SV      Bed Mobility PT STG, Time to Achieve 1 wk  -SV      Bed Mobility PT STG, Activity Type all bed mobility  -SV      Bed Mobility PT STG, [place] Level independent  -SV      Transfer Training PT STG    Transfer Training PT STG, Date Established 02/04/17  -SV      Transfer Training PT STG, Time to Achieve 1 wk  -SV      Transfer Training PT  STG, Activity Type all transfers  -SV      Transfer Training PT STG, [place] Level independent   least vs no AD  -SV      Gait Training PT STG    Gait Training Goal PT STG, Date Established 02/04/17  -SV      Gait Training Goal PT STG, Time to Achieve 1 wk  -SV      Gait Training Goal PT STG, [place] Level independent  -SV      Gait Training Goal PT STG, Assist Device --   least vs no AD  -SV      Gait Training Goal PT STG, Distance to Achieve 300  -SV        User Key  (r) = Recorded By, (t) = Taken By, (c) = Cosigned By    Initials Name Provider Type     Zaria Marvin, PT Physical Therapist          Physical Therapy Education     Title: PT OT SLP Therapies (Done)     Topic: Physical Therapy (Done)     Point: Mobility training (Done)    Learning Progress Summary    Learner Readiness Method Response Comment Documented by Status   Patient Acceptance E,D NR,[facility] 02/04/17 1520 Done               Point: Home exercise program (Done)    Learning Progress Summary    Learner Readiness Method Response Comment Documented by Status   Patient Acceptance E,D NR,[facility] 02/04/17 1520 Done                      User Key     Initials Effective Dates Name Provider Type Discipline     01/17/16 -  Zaria Marvin, BETSEY Physical Therapist PT                    PT Recommendation and Plan  Anticipated Discharge Disposition: skilled nursing facility  Plan of Care Review  Plan Of Care Reviewed With: patient  Progress: improving          Outcome Measures       02/05/17 1000 02/04/17 1500       How much help from another person do you currently need...    Turning from your back to your side while in flat bed without using bedrails? 4  -RH 4  -SV     Moving from lying on back to sitting on the side of a flat bed without bedrails? 4  -RH 4  -SV     Moving to and from a bed to a chair (including a wheelchair)? 3  -RH 3  -SV     Standing up from a chair using your arms (e.g., wheelchair, bedside chair)? 3  -RH 3  -SV     Climbing  3-5 steps with a railing? 3  -RH 3  -SV     To walk in hospital room? 3  -RH 3  -SV     AM-PAC 6 Clicks Score 20  -RH 20  -SV     Functional Assessment    Outcome Measure Options  AM-PAC 6 Clicks Basic Mobility (PT)  -SV       User Key  (r) = Recorded By, (t) = Taken By, (c) = Cosigned By    Initials Name Provider Type     Jimy West PTA Physical Therapy Assistant     Zaria Marvin, PT Physical Therapist           Time Calculation:         PT Charges       02/05/17 1042          Time Calculation    Start Time 1029  -      Stop Time 1044  -      Time Calculation (min) 15 min  -      PT Received On 02/05/17  -      PT - Next Appointment 02/06/17  -        User Key  (r) = Recorded By, (t) = Taken By, (c) = Cosigned By    Initials Name Provider Type     Jimy West PTA Physical Therapy Assistant          Therapy Charges for Today     Code Description Service Date Service Provider Modifiers Qty    54752179061 HC PT THER PROC EA 15 MIN 2/5/2017 Jimy West PTA GP 1          PT G-Codes  Outcome Measure Options: AM-PAC 6 Clicks Basic Mobility (PT)    Jimy West PTA  2/5/2017

## 2017-02-05 NOTE — CONSULTS
Patient Care Team:  No Known Provider as PCP - General  No Known Provider as PCP - Family Medicine    Chief complaint asked to evaluate for dense infiltrate/mass in the right lung    Subjective     Patient is a 66 y.o. male nursing home resident whom I have spent time with trying to get a history and absolute been unable to I called his sister Re who is his POA and found that he has dementia which was pretty obvious talking with him.  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  He's been residing at the nursing home he's had dysphagia and he has a feeding tube although apparently he has been upgraded to a diet.  They felt in December that he had a pneumonia he was treated at the nursing facility for that and apparently got better then she got the call that he was having more problems in the sent over here.  Does have coronary artery disease and had prior coronary bypass grafting he has hypertension hyperlipidemia.  He smoked for many years     Review of Systems      History  Past Medical History   Diagnosis Date   • SCAR (acute kidney injury)    • Brain cancer      caancer head, neck and face   • COPD (chronic obstructive pulmonary disease)    • Coronary artery disease    • Dysphagia    • Hiatal hernia    • Hyperlipidemia    • Hypertension    • Malignant neoplasm of head    • Squamous cell carcinoma      of neck     Past Surgical History   Procedure Laterality Date   • Spine surgery     • Cardiac surgery       History reviewed. No pertinent family history.  Social History     Social History   • Marital status:      Spouse name: N/A   • Number of children: N/A   • Years of education: N/A     Social History Main Topics   • Smoking status: Former Smoker   • Smokeless tobacco: None      Comment: stopped about a year ago   • Alcohol use No   • Drug use: No   • Sexual activity:  "Defer     Other Topics Concern   • None     Social History Narrative       Allergies:  Review of patient's allergies indicates no known allergies.    Objective     Vital Signs  Temp:  [97.9 °F (36.6 °C)-98.2 °F (36.8 °C)] 98.2 °F (36.8 °C)  Heart Rate:  [68-90] 84  Resp:  [16-18] 18  BP: (106-135)/(73-89) 106/80  Body mass index is 21.09 kg/(m^2).    Intake/Output Summary (Last 24 hours) at 02/05/17 1533  Last data filed at 02/05/17 1400   Gross per 24 hour   Intake    250 ml   Output    250 ml   Net      0 ml     I/O this shift:  In: 250 [IV Piggyback:250]  Out: -     Flowsheet Rows         First Filed Value    Admission Height  70\" (177.8 cm) Documented at 02/03/2017 1523    Admission Weight  160 lb (72.6 kg) Documented at 02/03/2017 1523           Physical Exam:  General Appearance: Well-developed white male resting in bed does not appear in any acute distress  Eyes: Conjunctiva are clear and anicteric pupils are unequal the right is about  2.5 mm and the left is about 3.5 mm both react to light  ENT: Mucous membranes are little dry voice is hoarse  Neck: He appears midline he has an old scar consistent with a prior tracheostomy  Lungs: His lungs are pretty clear he does have a little decreased breath sounds at the right base versus the left but I don't percuss any dullness chest expansion is symmetric and nonlabored  Cardiac: Regular rate and rhythm no murmur  Abdomen: Soft nontender no palpable organomegaly or masses he does have a left upper quadrant 2 type tube  : Not examined  Musc/Skel: Grossly normal  Skin: No jaundice no rashes no petechiae noted  Neuro: Definitely moves all 4 extremities but he is very confused  Extremities/P Vascular: No clubbing cyanosis or edema palpable radial dorsalis pedis pulses  MSE: Hard To assess      Labs:  WBC No results found for: WBCS   HGB HEMOGLOBIN   Date Value Ref Range Status   02/05/2017 9.6 (L) 13.7 - 17.6 g/dL Final   02/04/2017 9.9 (L) 13.7 - 17.6 g/dL Final "   02/03/2017 11.1 (L) 13.7 - 17.6 g/dL Final      HCT HEMATOCRIT   Date Value Ref Range Status   02/05/2017 30.0 (L) 40.4 - 52.2 % Final   02/04/2017 31.4 (L) 40.4 - 52.2 % Final   02/03/2017 35.1 (L) 40.4 - 52.2 % Final      Platlets No results found for: LABPLAT     PT/INR:      PROTIME   Date Value Ref Range Status   02/03/2017 14.7 (H) 11.7 - 14.2 Seconds Final   /  INR   Date Value Ref Range Status   02/03/2017 1.19 (H) 0.90 - 1.10 Final       Sodium SODIUM   Date Value Ref Range Status   02/05/2017 136 136 - 145 mmol/L Final   02/04/2017 136 136 - 145 mmol/L Final   02/03/2017 132 (L) 136 - 145 mmol/L Final      Potassium POTASSIUM   Date Value Ref Range Status   02/05/2017 3.2 (L) 3.5 - 5.2 mmol/L Final   02/04/2017 4.1 3.5 - 5.2 mmol/L Final   02/03/2017 3.9 3.5 - 5.2 mmol/L Final      Chloride CHLORIDE   Date Value Ref Range Status   02/05/2017 97 (L) 98 - 107 mmol/L Final   02/04/2017 97 (L) 98 - 107 mmol/L Final   02/03/2017 89 (L) 98 - 107 mmol/L Final      Bicarbonate No results found for: PLASMABICARB   BUN BUN   Date Value Ref Range Status   02/05/2017 6 (L) 8 - 23 mg/dL Final   02/04/2017 9 8 - 23 mg/dL Final   02/03/2017 13 8 - 23 mg/dL Final      Creatinine CREATININE   Date Value Ref Range Status   02/05/2017 0.50 (L) 0.76 - 1.27 mg/dL Final   02/04/2017 0.55 (L) 0.76 - 1.27 mg/dL Final   02/03/2017 0.61 (L) 0.76 - 1.27 mg/dL Final      Calcium CALCIUM   Date Value Ref Range Status   02/05/2017 8.5 (L) 8.6 - 10.5 mg/dL Final   02/04/2017 8.4 (L) 8.6 - 10.5 mg/dL Final   02/03/2017 9.7 8.6 - 10.5 mg/dL Final      Magnesium No results found for: MG     pH No results found for: PHART   pO2 No results found for: PO2ART   pCO2 No results found for: QFF2YVR   HCO3 No results found for: NXP5QIX       Radiographic Imaging:  Imaging Results (last 24 hours)     Procedure Component Value Units Date/Time    CT Chest Without Contrast [[id number]] Collected:  02/04/17 1821     Updated:  02/04/17 1821     Narrative:       CT CHEST WITHOUT CONTRAST     HISTORY: 66-year-old male with an abnormal chest radiograph. The history  states the patient is asymptomatic. Previous history of head and neck  cancer. Tracheostomy.     TECHNIQUE: 3 mm images were obtained through the chest without the  administration of IV contrast. Compared with chest radiograph performed  yesterday.     FINDINGS: There is a large dense masslike airspace consolidation  completely opacifying the lateral segment of the right middle lobe.  There is also a masslike airspace consolidation at the posterior aspect  of the right lower lobe. There is lateral pleural thickening between the  right middle lobe and the right lower lobe airspace consolidations and  there is a minimal right pleural effusion. There is a reticular nodular  opacity within the lingula and there is mild atelectatic change at the  left lung base. There is fullness of the right hilum, but the  noncontrasted technique limits characterization and visualization of a  discrete node. There is an enlarged subcarinal node measuring 2.6 x 2.0  cm. Small volume of mucus/secretions are noted within the dependent  aspect of the trachea. Percutaneous G-tube is noted in the visualized  upper abdomen.       Impression:       1. The large dense masslike airspace consolidations within the right  middle lobe and right lower lobe are of uncertain etiology. Extensive  pneumonia is possible and there may be some chronicity given the  adjacent pleural thickening. However, a malignancy cannot be excluded.  There is right hilar fullness and subcarinal lymphadenopathy as  described. The lymphadenopathy may be reactive, but may be metastatic as  well.  2. Reticular nodular opacity within the lingula is suspected to  represent an acute infectious infiltrate.  3. Small volume of mucus/secretions are noted within the dependent  aspect of the trachea.             I reviewed the CT scan of the chest and I agree with  the radiology interpretation above    Assessment/Plan     Patient Active Problem List   Diagnosis Code   • Pneumonia of right lower lobe due to infectious organism J18.9         Impression #1 dense right masslike airspace consolidations with adjacent pleural thickening and right hilar and subcarinal lymphadenopathy impression her only very minimal white count elevation on admission no fever normal pro-calcitonin.  These things to make one suspicious of the diagnosis of infectious pneumonia.  He does have a history of dysphagia and certainly aspiration has to be considered a peripheralof these are somewhat against this.  Patient to infection noninfectious pneumonia/pneumonitis as such as cryptogenic organizing pneumonia have to be considered and obviously malignancy particularly with the what I'm told is a heavy smoking history.  It also have a metastatic process given his prior squamous carcinoma the head and neck.  The patient probably does need bronchoscopic evaluation biopsy E BUS to biopsy these lymph nodes to rule out malignancy for interstitial disease one would have to consider more aggressive surgical biopsy or empiric therapy.  In a patient with dysphagia, putting her on immunosuppressants whether it's significant dose steroids and/or steroid sparing immunosuppressants for tenderness or significant risks.  If this were to be malignancy with the extent that's present in his functional class I don't think he would be a candidate even for chemotherapy.  I think over the big issues is how much to report this gentleman through trying to find out what this is versus just treating for infection and monitoring to see how he responds.  I think a lot of this depends on whether the family since he is incapable of making a decision, would want to treat him if he had one of these problems.  I certainly wouldn't want to putting him through the risk of biopsies etc. if we were not going to treat.  I discussed this with  his sister in great detail and she wants to talk with her other sister and  and they will get back with us  #2 advanced dementia  #3 history of head and neck squamous cell carcinoma  #4 urinary artery disease      Kyrie Linn MD  02/05/17  3:33 PM    Time: I spent well over an hour 15 minutes with this patient today is consult and discussing with his sister.

## 2017-02-05 NOTE — PROGRESS NOTES
"DAILY PROGRESS NOTE  [facility]    Patient Identification:  Name: Neville Mendez  Age: 66 y.o.  Sex: male  :  1950  MRN: [id number]         Primary Care Physician: No Known Provider      Subjective  No new c/o.     Objective:  General Appearance:  Comfortable, well-appearing, in no acute distress and not in pain.    Vital signs: (most recent): Blood pressure 134/89, pulse 88, temperature 97.9 °F (36.6 °C), temperature source Oral, resp. rate 18, height 70\" (177.8 cm), weight 147 lb (66.7 kg), SpO2 98 %.    Lungs:  Normal respiratory rate and normal effort.  He is not in respiratory distress.  No stridor.  There are decreased breath sounds.  No wheezes, rales or rhonchi.    Heart: Normal rate.  Regular rhythm.    Abdomen: Abdomen is soft and non-distended.  Bowel sounds are normal.     Extremities: There is no dependent edema.    Neurological: Patient is alert and oriented to person, place and time.    Skin:  Warm and dry.                Vital signs in last 24 hours:  Temp:  [97.9 °F (36.6 °C)-98.2 °F (36.8 °C)] 97.9 °F (36.6 °C)  Heart Rate:  [68-90] 88  Resp:  [16-18] 18  BP: (126-135)/(73-89) 134/89    Intake/Output:    Intake/Output Summary (Last 24 hours) at 17 1242  Last data filed at 17 1109   Gross per 24 hour   Intake     50 ml   Output    250 ml   Net   -200 ml         Exam:    Physical Exam   Cardiovascular: Normal rate and regular rhythm.    Pulmonary/Chest: Effort normal. No stridor. No respiratory distress. He has decreased breath sounds. He has no wheezes. He has no rhonchi. He has no rales.   Abdominal: Soft. Bowel sounds are normal. He exhibits no distension.   Neurological: He is alert.   Skin: Skin is warm and dry.         Results from last 7 days  Lab Units 17  0515 17  0411 17  1651   WBC 10*3/mm3 10.76* 10.92* 11.78*   HEMOGLOBIN g/dL 9.6* 9.9* 11.1*   PLATELETS 10*3/mm3 424 469 098*     Results from last 7 days  Lab Units 17  0516 " 02/04/17  0411 02/03/17  1651   SODIUM mmol/L 136 136 132*   POTASSIUM mmol/L 3.2* 4.1 3.9   CHLORIDE mmol/L 97* 97* 89*   TOTAL CO2 mmol/L 22.8 26.4 32.5*   BUN mg/dL 6* 9 13   CREATININE mg/dL 0.50* 0.55* 0.61*   GLUCOSE mg/dL 84 125* 135*   Estimated Creatinine Clearance: 83.4 mL/min (by C-G formula based on Cr of 0.5).  Results from last 7 days  Lab Units 02/05/17  0516 02/04/17  0411 02/03/17  1651   CALCIUM mg/dL 8.5* 8.4* 9.7   ALBUMIN g/dL  --   --  3.50     Results from last 7 days  Lab Units 02/03/17  1651   ALBUMIN g/dL 3.50   BILIRUBIN mg/dL 0.6   ALK PHOS U/L 84   AST (SGOT) U/L 16   ALT (SGPT) U/L 13       Assessment:  Pulm infiltrate - Pneumonia &/o mass:  Pulm consult.   Reported G tube bleeding/leakage:  Hyponatremia:  Resolved.   Hyperglycemia:  Dysphagia:  CAD:  COPD:  Anemia:  Head and neck CA:      Plan:  GI input appreciated.  Please see above.   Discussed with Pulm.   Robin Corcoran MD  2/5/2017  12:42 PM

## 2017-02-06 ENCOUNTER — APPOINTMENT (OUTPATIENT)
Dept: GENERAL RADIOLOGY | Facility: HOSPITAL | Age: 67
End: 2017-02-06

## 2017-02-06 PROBLEM — F03.90 DEMENTIA WITHOUT BEHAVIORAL DISTURBANCE (HCC): Status: ACTIVE | Noted: 2017-02-06

## 2017-02-06 PROBLEM — R13.10 DYSPHAGIA: Status: ACTIVE | Noted: 2017-02-06

## 2017-02-06 LAB
ANION GAP SERPL CALCULATED.3IONS-SCNC: 11 MMOL/L
BASOPHILS # BLD AUTO: 0.01 10*3/MM3 (ref 0–0.2)
BASOPHILS NFR BLD AUTO: 0.1 % (ref 0–1.5)
BUN BLD-MCNC: 6 MG/DL (ref 8–23)
BUN/CREAT SERPL: 9.8 (ref 7–25)
CALCIUM SPEC-SCNC: 8.3 MG/DL (ref 8.6–10.5)
CHLORIDE SERPL-SCNC: 100 MMOL/L (ref 98–107)
CO2 SERPL-SCNC: 25 MMOL/L (ref 22–29)
CREAT BLD-MCNC: 0.61 MG/DL (ref 0.76–1.27)
DEPRECATED RDW RBC AUTO: 46.4 FL (ref 37–54)
EOSINOPHIL # BLD AUTO: 0.33 10*3/MM3 (ref 0–0.7)
EOSINOPHIL NFR BLD AUTO: 2.9 % (ref 0.3–6.2)
ERYTHROCYTE [DISTWIDTH] IN BLOOD BY AUTOMATED COUNT: 14.6 % (ref 11.5–14.5)
GFR SERPL CREATININE-BSD FRML MDRD: 131 ML/MIN/1.73
GLUCOSE BLD-MCNC: 166 MG/DL (ref 65–99)
HCT VFR BLD AUTO: 29.6 % (ref 40.4–52.2)
HGB BLD-MCNC: 9.4 G/DL (ref 13.7–17.6)
IMM GRANULOCYTES # BLD: 0.03 10*3/MM3 (ref 0–0.03)
IMM GRANULOCYTES NFR BLD: 0.3 % (ref 0–0.5)
LYMPHOCYTES # BLD AUTO: 0.98 10*3/MM3 (ref 0.9–4.8)
LYMPHOCYTES NFR BLD AUTO: 8.6 % (ref 19.6–45.3)
MCH RBC QN AUTO: 27.4 PG (ref 27–32.7)
MCHC RBC AUTO-ENTMCNC: 31.8 G/DL (ref 32.6–36.4)
MCV RBC AUTO: 86.3 FL (ref 79.8–96.2)
MONOCYTES # BLD AUTO: 0.87 10*3/MM3 (ref 0.2–1.2)
MONOCYTES NFR BLD AUTO: 7.7 % (ref 5–12)
MRSA SPEC QL CULT: NORMAL
NEUTROPHILS # BLD AUTO: 9.11 10*3/MM3 (ref 1.9–8.1)
NEUTROPHILS NFR BLD AUTO: 80.4 % (ref 42.7–76)
PLATELET # BLD AUTO: 419 10*3/MM3 (ref 140–500)
PMV BLD AUTO: 9.2 FL (ref 6–12)
POTASSIUM BLD-SCNC: 3.5 MMOL/L (ref 3.5–5.2)
POTASSIUM BLD-SCNC: 3.7 MMOL/L (ref 3.5–5.2)
RBC # BLD AUTO: 3.43 10*6/MM3 (ref 4.6–6)
SODIUM BLD-SCNC: 136 MMOL/L (ref 136–145)
VANCOMYCIN TROUGH SERPL-MCNC: 14.4 MCG/ML (ref 5–20)
WBC NRBC COR # BLD: 11.33 10*3/MM3 (ref 4.5–10.7)

## 2017-02-06 PROCEDURE — 25810000003 DEXTROSE-NACL PER 500 ML: Performed by: INTERNAL MEDICINE

## 2017-02-06 PROCEDURE — 85025 COMPLETE CBC W/AUTO DIFF WBC: CPT | Performed by: HOSPITALIST

## 2017-02-06 PROCEDURE — 25010000002 PIPERACILLIN SOD-TAZOBACTAM PER 1 G: Performed by: INTERNAL MEDICINE

## 2017-02-06 PROCEDURE — 25010000002 VANCOMYCIN: Performed by: HOSPITALIST

## 2017-02-06 PROCEDURE — 74230 X-RAY XM SWLNG FUNCJ C+: CPT

## 2017-02-06 PROCEDURE — 80202 ASSAY OF VANCOMYCIN: CPT | Performed by: INTERNAL MEDICINE

## 2017-02-06 PROCEDURE — 92611 MOTION FLUOROSCOPY/SWALLOW: CPT

## 2017-02-06 PROCEDURE — 97110 THERAPEUTIC EXERCISES: CPT

## 2017-02-06 PROCEDURE — 80048 BASIC METABOLIC PNL TOTAL CA: CPT | Performed by: HOSPITALIST

## 2017-02-06 PROCEDURE — 99232 SBSQ HOSP IP/OBS MODERATE 35: CPT | Performed by: INTERNAL MEDICINE

## 2017-02-06 RX ADMIN — VANCOMYCIN HYDROCHLORIDE 1250 MG: 1 INJECTION, POWDER, LYOPHILIZED, FOR SOLUTION INTRAVENOUS at 18:32

## 2017-02-06 RX ADMIN — MIRTAZAPINE 7.5 MG: 15 TABLET, FILM COATED ORAL at 20:48

## 2017-02-06 RX ADMIN — OXYCODONE HYDROCHLORIDE AND ACETAMINOPHEN 500 MG: 500 TABLET ORAL at 08:50

## 2017-02-06 RX ADMIN — TAZOBACTAM SODIUM AND PIPERACILLIN SODIUM 3.38 G: 375; 3 INJECTION, SOLUTION INTRAVENOUS at 17:27

## 2017-02-06 RX ADMIN — ASPIRIN 81 MG: 81 TABLET, CHEWABLE ORAL at 08:50

## 2017-02-06 RX ADMIN — CALCIUM CARBONATE 1250 MG: 1250 SUSPENSION ORAL at 08:50

## 2017-02-06 RX ADMIN — Medication: at 20:48

## 2017-02-06 RX ADMIN — Medication 1 TABLET: at 08:50

## 2017-02-06 RX ADMIN — TAZOBACTAM SODIUM AND PIPERACILLIN SODIUM 3.38 G: 375; 3 INJECTION, SOLUTION INTRAVENOUS at 10:19

## 2017-02-06 RX ADMIN — VANCOMYCIN HYDROCHLORIDE 1250 MG: 1 INJECTION, POWDER, LYOPHILIZED, FOR SOLUTION INTRAVENOUS at 06:43

## 2017-02-06 RX ADMIN — TAZOBACTAM SODIUM AND PIPERACILLIN SODIUM 3.38 G: 375; 3 INJECTION, SOLUTION INTRAVENOUS at 02:56

## 2017-02-06 RX ADMIN — LISINOPRIL 5 MG: 5 TABLET ORAL at 08:50

## 2017-02-06 RX ADMIN — CALCIUM POLYCARBOPHIL 625 MG: 625 TABLET ORAL at 08:50

## 2017-02-06 RX ADMIN — CALCIUM CARBONATE 1250 MG: 1250 SUSPENSION ORAL at 17:28

## 2017-02-06 RX ADMIN — DEXTROSE AND SODIUM CHLORIDE 75 ML/HR: 5; 900 INJECTION, SOLUTION INTRAVENOUS at 10:19

## 2017-02-06 RX ADMIN — CALCIUM POLYCARBOPHIL 625 MG: 625 TABLET ORAL at 17:27

## 2017-02-06 NOTE — PROGRESS NOTES
" LOS: 3 days   Primary Care Physician: No Known Provider     Subjective  Pt reports no CP SOA or NV. Tolerating TF well.    Vital Signs  Body mass index is 21.09 kg/(m^2).  Temp:  [97.5 °F (36.4 °C)-98.9 °F (37.2 °C)] 98.9 °F (37.2 °C)  Heart Rate:  [73-96] 73  Resp:  [16-18] 18  BP: (106-153)/(72-83) 126/83      Objective:  General Appearance:  Comfortable, well-appearing, in no acute distress and not in pain.    Vital signs: (most recent): Blood pressure 126/83, pulse 73, temperature 98.9 °F (37.2 °C), temperature source Oral, resp. rate 18, height 70\" (177.8 cm), weight 147 lb (66.7 kg), SpO2 97 %.    Lungs:  Normal respiratory rate and normal effort.  He is not in respiratory distress.  No stridor.  There are rhonchi and decreased breath sounds.  No wheezes or rales.    Heart: Normal rate.  Regular rhythm.    Abdomen: Abdomen is soft and non-distended.  Bowel sounds are normal.   (PEG site dressed).     Extremities: There is no dependent edema.    Pulses: Distal pulses are intact.    Neurological: Patient is alert and oriented to person, place and time.    Pupils:  Pupils are equal, round, and reactive to light.    Skin:  Warm and dry.                Results Review:    I reviewed the patient's new clinical results.      Results from last 7 days  Lab Units 02/06/17  0545 02/05/17  0515   WBC 10*3/mm3 11.33* 10.76*   HEMOGLOBIN g/dL 9.4* 9.6*   PLATELETS 10*3/mm3 419 424       Results from last 7 days  Lab Units 02/06/17  0545 02/05/17  2308 02/05/17  0516   SODIUM mmol/L 136  --  136   POTASSIUM mmol/L 3.5 3.7 3.2*   CHLORIDE mmol/L 100  --  97*   TOTAL CO2 mmol/L 25.0  --  22.8   BUN mg/dL 6*  --  6*   CREATININE mg/dL 0.61*  --  0.50*   CALCIUM mg/dL 8.3*  --  8.5*   GLUCOSE mg/dL 166*  --  84       Results from last 7 days  Lab Units 02/03/17  1651   INR  1.19*     Hemoglobin A1C:  Lab Results   Component Value Date    HGBA1C 5.37 02/03/2017       Glucose Range:No results found for: POCGLU    Medication " Review: Yes    Physical Therapy:    Assessment/Plan     Active Hospital Problems (** Indicates Principal Problem)    Diagnosis Date Noted   • **Pneumonia of right lower lobe due to infectious organism [J18.9] 02/03/2017   • Dementia without behavioral disturbance [F03.90] 02/06/2017   • Dysphagia [R13.10] 02/06/2017      Resolved Hospital Problems    Diagnosis Date Noted Date Resolved   No resolved problems to display.       Assessment & Plan  -Pulmonology and GI cs  -Agree with need for GOC to guide additional workup. Will follow up after family has a chance to discuss. Continue ABx for now. Afebrile and on room air. WBC mildly elevated.  -TF per nutrition. NPO per speech eval.    Disposition: TBD    Boaz Estrella MD  02/06/17  1:39 PM

## 2017-02-06 NOTE — NURSING NOTE
CWOCN consult for skin around PEG tube- area is moist, pink with scattered patchy rash. Tube leaks per family and patient. RN reports having to change dressing multiple times per shift. Family states that at some point, the tube will be replaced- that was the plan but recently patient has had additional medical problems come up.  Recommend to use an antifungal barrier ointment and a nonocclusive dressing so that skin can breathe. Will follow patient PRN.

## 2017-02-06 NOTE — PROGRESS NOTES
LOS: 3 days   Patient Care Team:  No Known Provider as PCP - General  No Known Provider as PCP - Family Medicine    Chief Complaint:      Subjective: No complaints.  He is resting comfortably in bed.  No s/s acute distress. He is alert and responding to verbal commands.        Objective     Vital Signs  Temp:  [97.5 °F (36.4 °C)-98.9 °F (37.2 °C)] 98.9 °F (37.2 °C)  Heart Rate:  [73-96] 73  Resp:  [16-18] 18  BP: (106-153)/(72-83) 126/83  Body mass index is 21.09 kg/(m^2).    Intake/Output Summary (Last 24 hours) at 02/06/17 1226  Last data filed at 02/06/17 1019   Gross per 24 hour   Intake   1925 ml   Output    300 ml   Net   1625 ml     I/O this shift:  In: 975 [I.V.:975]  Out: -     Physical Exam:  General Appearance: Well-developed white male resting in bed does not appear in any acute distress  Eyes: Conjunctiva are clear and anicteric pupils are unequal the right is about 2.5 mm and the left is about 3.5 mm both react to light  ENT: Mucous membranes are little dry voice is hoarse  Neck: He appears midline he has an old scar consistent with a prior tracheostomy  Lungs: Decreased, particularly bilateral bases. Chest expansion is symmetric.  Cardiac: Regular rate and rhythm no M/C/G/R.  Abdomen: Soft nontender no palpable organomegaly or masses he does have a left upper quadrant tube.  : Not examined  Musc/Skel: Grossly normal  Skin: No jaundice no rashes no petechiae noted  Neuro: Definitely moves all 4 extremities but he is very confused, but cooperative.  Extremities/P Vascular: No clubbing cyanosis or edema palpable radial dorsalis pedis pulses  MSE: Pleasant and following commands.        Labs:  WBC No results found for: WBCS   HGB HEMOGLOBIN   Date Value Ref Range Status   02/06/2017 9.4 (L) 13.7 - 17.6 g/dL Final   02/05/2017 9.6 (L) 13.7 - 17.6 g/dL Final   02/04/2017 9.9 (L) 13.7 - 17.6 g/dL Final   02/03/2017 11.1 (L) 13.7 - 17.6 g/dL Final      HCT HEMATOCRIT   Date Value Ref Range Status    02/06/2017 29.6 (L) 40.4 - 52.2 % Final   02/05/2017 30.0 (L) 40.4 - 52.2 % Final   02/04/2017 31.4 (L) 40.4 - 52.2 % Final   02/03/2017 35.1 (L) 40.4 - 52.2 % Final      Platlets No results found for: LABPLAT     PT/INR:    PROTIME   Date Value Ref Range Status   02/03/2017 14.7 (H) 11.7 - 14.2 Seconds Final   /  INR   Date Value Ref Range Status   02/03/2017 1.19 (H) 0.90 - 1.10 Final       Sodium SODIUM   Date Value Ref Range Status   02/06/2017 136 136 - 145 mmol/L Final   02/05/2017 136 136 - 145 mmol/L Final   02/04/2017 136 136 - 145 mmol/L Final   02/03/2017 132 (L) 136 - 145 mmol/L Final      Potassium POTASSIUM   Date Value Ref Range Status   02/06/2017 3.5 3.5 - 5.2 mmol/L Final   02/05/2017 3.7 3.5 - 5.2 mmol/L Final   02/05/2017 3.2 (L) 3.5 - 5.2 mmol/L Final   02/04/2017 4.1 3.5 - 5.2 mmol/L Final   02/03/2017 3.9 3.5 - 5.2 mmol/L Final      Chloride CHLORIDE   Date Value Ref Range Status   02/06/2017 100 98 - 107 mmol/L Final   02/05/2017 97 (L) 98 - 107 mmol/L Final   02/04/2017 97 (L) 98 - 107 mmol/L Final   02/03/2017 89 (L) 98 - 107 mmol/L Final      Bicarbonate No results found for: PLASMABICARB   BUN BUN   Date Value Ref Range Status   02/06/2017 6 (L) 8 - 23 mg/dL Final   02/05/2017 6 (L) 8 - 23 mg/dL Final   02/04/2017 9 8 - 23 mg/dL Final   02/03/2017 13 8 - 23 mg/dL Final      Creatinine CREATININE   Date Value Ref Range Status   02/06/2017 0.61 (L) 0.76 - 1.27 mg/dL Final   02/05/2017 0.50 (L) 0.76 - 1.27 mg/dL Final   02/04/2017 0.55 (L) 0.76 - 1.27 mg/dL Final   02/03/2017 0.61 (L) 0.76 - 1.27 mg/dL Final      Calcium CALCIUM   Date Value Ref Range Status   02/06/2017 8.3 (L) 8.6 - 10.5 mg/dL Final   02/05/2017 8.5 (L) 8.6 - 10.5 mg/dL Final   02/04/2017 8.4 (L) 8.6 - 10.5 mg/dL Final   02/03/2017 9.7 8.6 - 10.5 mg/dL Final      Magnesium No results found for: MG         pH No results found for: PHART   pO2 No results found for: PO2ART   pCO2 No results found for: ERQ1JKP   HCO3 No  results found for: [id number]       aspirin 81 mg Per G Tube Daily   calcium carbonate 1,250 mg Per G Tube BID With Meals   lisinopril 5 mg Per G Tube Q24H   mirtazapine 7.5 mg Per G Tube Nightly   multivitamin 1 tablet Per G Tube Daily   piperacillin-tazobactam 3.375 g Intravenous Q8H   polycarbophil 625 mg Per G Tube BID   vancomycin 1,250 mg Intravenous Q12H   vitamin C 500 mg Per G Tube Daily       dextrose 5 % and sodium chloride 0.9 % 75 mL/hr Last Rate: 75 mL/hr (02/06/17 1019)   Pharmacy to dose vancomycin         Diagnostics:  Imaging Results (last 24 hours)     Procedure Component Value Units Date/Time    CT Chest Without Contrast [[id number]] Collected:  02/04/17 1821     Updated:  02/05/17 1839    Narrative:       CT CHEST WITHOUT CONTRAST     HISTORY: 66-year-old male with an abnormal chest radiograph. The history  states the patient is asymptomatic. Previous history of head and neck  cancer. Tracheostomy.     TECHNIQUE: 3 mm images were obtained through the chest without the  administration of IV contrast. Compared with chest radiograph performed  yesterday.     FINDINGS: There is a large dense masslike airspace consolidation  completely opacifying the lateral segment of the right middle lobe.  There is also a masslike airspace consolidation at the posterior aspect  of the right lower lobe. There is lateral pleural thickening between the  right middle lobe and the right lower lobe airspace consolidations and  there is a minimal right pleural effusion. There is a reticular nodular  opacity within the lingula and there is mild atelectatic change at the  left lung base. There is fullness of the right hilum, but the  noncontrasted technique limits characterization and visualization of a  discrete node. There is an enlarged subcarinal node measuring 2.6 x 2.0  cm. Small volume of mucus/secretions are noted within the dependent  aspect of the trachea. Percutaneous G-tube is noted in the visualized  upper  abdomen.       Impression:       1. The large dense masslike airspace consolidations within the right  middle lobe and right lower lobe are of uncertain etiology. Extensive  pneumonia is possible and there may be some chronicity given the  adjacent pleural thickening. However, a malignancy cannot be excluded.  There is right hilar fullness and subcarinal lymphadenopathy as  described. The lymphadenopathy may be reactive, but may be metastatic as  well.  2. Reticular nodular opacity within the lingula is suspected to  represent an acute infectious infiltrate.  3. Small volume of mucus/secretions are noted within the dependent  aspect of the trachea.     This report was finalized on 2/5/2017 6:36 PM by Dr. Kayla Pressley MD.       FL Video Swallow [[id number]] Collected:  02/06/17 1011     Updated:  02/06/17 1015    Narrative:       BARIUM SWALLOW WITH VIDEO     CLINICAL HISTORY:   Male who is 66 years-old, with dysphagia.     TECHNIQUE: The swallowing mechanism was evaluated with real-time  fluoroscopic imaging, captured on digital disk and performed in  conjunction with speech pathologist (please see report).     FINDINGS: Moderate to severe oropharyngeal dysphagia characterized by  posterior spillage resulting in deep silent penetration of thin liquids  before, during & after the swallow. Eventual trace aspiration occurred  during & after the swallow w/a throat clear in response. With nectar  thick, honey thick & puree deep silent penetration occurred before,  during & after the swallow; although not observed will likely aspirate  over time.        Impression:       Moderate to severe risk of aspiration.     FLUOROSCOPY TIME: 1 minute 52 seconds, 4 images      This report was finalized on 2/6/2017 10:12 AM by Dr. Willy Aden MD.           I have reviewed results of  Video Swallow.  Will defer treatment to speech pathologist.        Assessment/Plan     Patient Active Problem List   Diagnosis Code   • Pneumonia of right  lower lobe due to infectious organism J18.9     Impression:    #1 dense right masslike airspace consolidations with adjacent pleural thickening and right hilar and subcarinal lymphadenopathy , only very minimal white count elevation on admission no fever, normal pro-calcitonin. These things do make one suspicious of the diagnosis of infectious pneumonia. He does have a history of dysphagia and certainly aspiration has to be considered the peripheral distribution of these are somewhat against this. Differential includes infection, noninfectious pneumonia/pneumonitis  such as cryptogenic organizing pneumonia have to be considered and obviously malignancy particularly with the what I'm told is a heavy smoking history. It also have a metastatic process given his prior squamous carcinoma the head and neck. The patient probably does need bronchoscopic evaluation biopsy E BUS to biopsy these lymph nodes to rule out malignancy for interstitial disease one would have to consider more aggressive surgical biopsy or empiric therapy. In a patient with dysphagia, putting him on immunosuppressants whether it's significant dose steroids and/or steroid sparing immunosuppressants do carry significant risks. If this were to be malignancy with the extent that's present in his functional class I don't think he would be a candidate even for chemotherapy. I think the big issue is how much to put this gentleman through trying to find out what this is versus just treating for infection and monitoring to see how he responds. I think a lot of this depends on whether the family, since he is incapable of making a decision, would want to treat him if he had one of these problems. I certainly wouldn't want to putting him through the risk of biopsies etc. if we were not going to treat. 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  They get together and try and come to some decision wants the best approach.  Plan is to continue antibiotics    Plan for disposition:    Janet Linn, MELISSA  02/06/17  12:26 PM  Seen and examined the above note reviewed, modified and verified by myself JOSE Linn Junior, M.D.  Time: I spent over 45 minutes with the patient today went over half the time was spent in consultation with the patient and his family

## 2017-02-06 NOTE — PROGRESS NOTES
[facility]  Inpatient Progress Note    Reason for Follow Up:  dysphagia    Subjective     Interval History:   C/o discomfort around g-tube.  No new issues.    Current Facility-Administered Medications:   •  albuterol (PROVENTIL) nebulizer solution 0.083% 2.5 mg/3mL, 2.5 mg, Nebulization, Q4H PRN, Willian Burgess MD  •  aspirin chewable tablet 81 mg, 81 mg, Per G Tube, Daily, Willian Burgess MD, 81 mg at 02/06/17 0850  •  calcium carbonate 1250 (500 CA) MG/5ML suspension 1,250 mg, 1,250 mg, Per G Tube, BID With Meals, Willian Burgess MD, 1,250 mg at 02/06/17 0850  •  dextrose 5 % and sodium chloride 0.9 % infusion, 75 mL/hr, Intravenous, Continuous, Willian Burgess MD, Last Rate: 75 mL/hr at 02/06/17 1019, 75 mL/hr at 02/06/17 1019  •  HYDROcodone-acetaminophen (NORCO) 5-325 MG per tablet 1 tablet, 1 tablet, Oral, Q4H PRN, Willian Burgess MD, 1 tablet at 02/05/17 1410  •  lisinopril (PRINIVIL,ZESTRIL) tablet 5 mg, 5 mg, Per G Tube, Q24H, Willian Burgess MD, 5 mg at 02/06/17 0850  •  mirtazapine (REMERON) tablet 7.5 mg, 7.5 mg, Per G Tube, Nightly, Willian Burgess MD, 7.5 mg at 02/05/17 2025  •  multivitamin (THERAGRAN) tablet 1 tablet, 1 tablet, Per G Tube, Daily, Willian Burgess MD, 1 tablet at 02/06/17 0850  •  Pharmacy to dose vancomycin, , Does not apply, Continuous PRN, Willian Burgess MD  •  piperacillin-tazobactam (ZOSYN) 3.375 g in dextrose 50 mL IVPB (premix), 3.375 g, Intravenous, Q8H, Willian Burgess MD, 3.375 g at 02/06/17 1019  •  polycarbophil (FIBERCON) tablet 625 mg, 625 mg, Per G Tube, BID, Willian Burgess MD, 625 mg at 02/06/17 0850  •  [DISCONTINUED] potassium chloride (MICRO-K) CR capsule 40 mEq, 40 mEq, Oral, PRN **OR** potassium chloride (KLOR-CON) packet 40 mEq, 40 mEq, Per G Tube, PRN **OR** potassium chloride 10 mEq in 100 mL IVPB, 10 mEq, Intravenous, Q1H PRN, Robin Corcoran MD, Last  Rate: 100 mL/hr at 02/05/17 1807, 10 mEq at 02/05/17 1807  •  sodium chloride 0.9 % flush 1-10 mL, 1-10 mL, Intravenous, PRN, Willian Burgess MD  •  Insert peripheral IV, , , Once **AND** sodium chloride 0.9 % flush 10 mL, 10 mL, Intravenous, PRN, Anil Ho MD  •  vancomycin 1250 mg/250 mL 0.9% NS IVPB ([facility]), 1,250 mg, Intravenous, Q12H, Robin Corcoran MD, 1,250 mg at 02/06/17 0643  •  vitamin C (ASCORBIC ACID) tablet 500 mg, 500 mg, Per G Tube, Daily, Willian Burgess MD, 500 mg at 02/06/17 0850  •  zolpidem (AMBIEN) tablet 2.5 mg, 2.5 mg, Oral, Nightly PRN, Robin Corcoran MD  Review of Systems:    reviewed and neg except hpi    Objective     Vital Signs  Temp:  [97.5 °F (36.4 °C)-98.9 °F (37.2 °C)] 98.9 °F (37.2 °C)  Heart Rate:  [73-96] 73  Resp:  [16-18] 18  BP: (106-153)/(72-83) 126/83  Body mass index is 21.09 kg/(m^2).    Intake/Output Summary (Last 24 hours) at 02/06/17 1115  Last data filed at 02/06/17 1019   Gross per 24 hour   Intake   1925 ml   Output    300 ml   Net   1625 ml     I/O this shift:  In: 975 [I.V.:975]  Out: -        Physical Exam:   General: patient awake, alert and cooperative   Eyes: Normal lids and lashes, no scleral icterus   Neck: supple, normal ROM   Skin: warm and dry, not jaundiced   Abdomen: soft, tender around g-tube - some leakage of gastric contents - no purulence   Rectal: deferred   Psychiatric: Normal mood and behavior      Results Review:     I reviewed the patient's new clinical results.      Results from last 7 days  Lab Units 02/06/17  0545 02/05/17  0515 02/04/17  0411   WBC 10*3/mm3 11.33* 10.76* 10.92*   HEMOGLOBIN g/dL 9.4* 9.6* 9.9*   HEMATOCRIT % 29.6* 30.0* 31.4*   PLATELETS 10*3/mm3 419 424 467         Results from last 7 days  Lab Units 02/06/17  0545 02/05/17  2308 02/05/17  0516 02/04/17  0411 02/03/17  1651   SODIUM mmol/L 136  --  136 136 132*   POTASSIUM mmol/L 3.5 3.7 3.2* 4.1 3.9   CHLORIDE mmol/L 100  --  97* 97* 89*   TOTAL  CO2 mmol/L 25.0  --  22.8 26.4 32.5*   BUN mg/dL 6*  --  6* 9 13   CREATININE mg/dL 0.61*  --  0.50* 0.55* 0.61*   CALCIUM mg/dL 8.3*  --  8.5* 8.4* 9.7   BILIRUBIN mg/dL  --   --   --   --  0.6   ALK PHOS U/L  --   --   --   --  84   ALT (SGPT) U/L  --   --   --   --  13   AST (SGOT) U/L  --   --   --   --  16   GLUCOSE mg/dL 166*  --  84 125* 135*         Results from last 7 days  Lab Units 02/03/17  1651   INR  1.19*       No results found for: LIPASE    Radiology:    Imaging Results (last 24 hours)     Procedure Component Value Units Date/Time    CT Chest Without Contrast [[id number]] Collected:  02/04/17 1821     Updated:  02/05/17 1839    Narrative:       CT CHEST WITHOUT CONTRAST     HISTORY: 66-year-old male with an abnormal chest radiograph. The history  states the patient is asymptomatic. Previous history of head and neck  cancer. Tracheostomy.     TECHNIQUE: 3 mm images were obtained through the chest without the  administration of IV contrast. Compared with chest radiograph performed  yesterday.     FINDINGS: There is a large dense masslike airspace consolidation  completely opacifying the lateral segment of the right middle lobe.  There is also a masslike airspace consolidation at the posterior aspect  of the right lower lobe. There is lateral pleural thickening between the  right middle lobe and the right lower lobe airspace consolidations and  there is a minimal right pleural effusion. There is a reticular nodular  opacity within the lingula and there is mild atelectatic change at the  left lung base. There is fullness of the right hilum, but the  noncontrasted technique limits characterization and visualization of a  discrete node. There is an enlarged subcarinal node measuring 2.6 x 2.0  cm. Small volume of mucus/secretions are noted within the dependent  aspect of the trachea. Percutaneous G-tube is noted in the visualized  upper abdomen.       Impression:       1. The large dense masslike airspace  consolidations within the right  middle lobe and right lower lobe are of uncertain etiology. Extensive  pneumonia is possible and there may be some chronicity given the  adjacent pleural thickening. However, a malignancy cannot be excluded.  There is right hilar fullness and subcarinal lymphadenopathy as  described. The lymphadenopathy may be reactive, but may be metastatic as  well.  2. Reticular nodular opacity within the lingula is suspected to  represent an acute infectious infiltrate.  3. Small volume of mucus/secretions are noted within the dependent  aspect of the trachea.     This report was finalized on 2/5/2017 6:36 PM by Dr. Kayla Pressley MD.       FL Video Swallow [[id number]] Collected:  02/06/17 1011     Updated:  02/06/17 1015    Narrative:       BARIUM SWALLOW WITH VIDEO     CLINICAL HISTORY:   Male who is 66 years-old, with dysphagia.     TECHNIQUE: The swallowing mechanism was evaluated with real-time  fluoroscopic imaging, captured on digital disk and performed in  conjunction with speech pathologist (please see report).     FINDINGS: Moderate to severe oropharyngeal dysphagia characterized by  posterior spillage resulting in deep silent penetration of thin liquids  before, during & after the swallow. Eventual trace aspiration occurred  during & after the swallow w/a throat clear in response. With nectar  thick, honey thick & puree deep silent penetration occurred before,  during & after the swallow; although not observed will likely aspirate  over time.        Impression:       Moderate to severe risk of aspiration.     FLUOROSCOPY TIME: 1 minute 52 seconds, 4 images      This report was finalized on 2/6/2017 10:12 AM by Dr. Willy Aden MD.               Assessment/Plan     Patient Active Problem List   Diagnosis Code   • Pneumonia of right lower lobe due to infectious organism J18.9     Assessment:  Pneumonia of right lower lobe due to infectious organism  ? Pulmonary mass  Dysphagia with history  of G tube      Recommendations:  Discussed results of VFSS with pt - recommendation is for NPO.  Significant risk of aspiration with po feeds.  Continue TF for now.  If he wishes to have oral feeds for QOL,speech has made recs for safest consistencies - he does have dementia so this decision should be made with his poa as well.  I am going to order a vertical holister clamp for his g0-tube to provent leakage and ask wound care to address the peristomal irritation.    Joanna Ingram MD  02/06/17  11:15 AM

## 2017-02-06 NOTE — PROGRESS NOTES
Acute Care - Speech Language Pathology   Swallow Initial Evaluation [facility]     Patient Name: Neville Mendez  : 1950  MRN: [id number]  Today's Date: 2017  Onset of Illness/Injury or Date of Surgery Date: 17            Admit Date: 2/3/2017   SPEECH-LANGUAGE PATHOLOGY EVALUTION - VFSS  Subjective: The patient was seen on this date for a VFSS(Videofluoroscopic Swallowing Study).  Patient was alert and cooperative.    Objective: Risks/benefits were reviewed with the patient, and consent was obtained. The study was completed with SLP present and Radiologist review. The patient was seen in lateral view(s). Textures given included thin liquid, nectar thick liquid, honey thick liquid and puree consistency.  Assessment: Moderate to severe oropharyngeal dypshagia characterized by posterior spillage resulting in deep silent penetration of thin liquids before, during & after the swallow.  Eventual trace aspiration occurred during & after the swallow w/ a throat clear in response.  With nectar thick, honey thick & puree, deep to the vocal cords silent penetration occurred before, during & after the swallow; although not observed will likely aspriate over time.  Pt had mild to moderate residue in the pharynx, due to absent epiglottic deflection and reduced hyolaryngeal excursion.    Recommendations: NPO is the safest option w/ full reliance on tube feeds.  However the pt desires to eat by mouth and any PO is at great risk to aspirate over time.  Consider the following: Allow PO diet of puree & thin liquids.  Strict oral care before & after meals; cue pt to cough/throat clear after every 2-3 bites/drinks during meals to help clear out any penetrated/aspirated material.  *Would suggest small quanties of PO for pleasure only, allowing most/all of nutrition to be via PEG.  All meds via PEG.   Recommended Strategies: Very small quanties for pleasure only; cue for cough/throat clear after every 2-3 bites/drinks  w/ meals, Upright for PO, small bites and sips, double swallow with all PO and no straw. Oral care before breakfast, after all meals and PRN.  Dysphagia therapy is not recommended. Rationale: swallow is at baseline.    Visit Dx:     ICD-10-CM ICD-9-CM   1. Pneumonia of right lower lobe due to infectious organism J18.9 483.8   2. Bleeding from g-tube R58 459.0   3. Difficulty walking R26.2 719.7     Patient Active Problem List   Diagnosis   • Pneumonia of right lower lobe due to infectious organism     Past Medical History   Diagnosis Date   • SCAR (acute kidney injury)    • Brain cancer      caancer head, neck and face   • COPD (chronic obstructive pulmonary disease)    • Coronary artery disease    • Dysphagia    • Hiatal hernia    • Hyperlipidemia    • Hypertension    • Malignant neoplasm of head    • Squamous cell carcinoma      of neck     Past Surgical History   Procedure Laterality Date   • Spine surgery     • Cardiac surgery            SWALLOW EVALUATION (last 72 hours)      Swallow Evaluation       02/06/17 0852 02/04/17 1436             Rehab Evaluation    Document Type evaluation  -NB evaluation  -SA       Subjective Information  agree to therapy  -SA       Patient Effort, Rehab Treatment  good  -SA       Symptoms Noted During/After Treatment none  -NB other (see comments)   increased coughing  -SA       General Information    Patient Profile Review yes  -NB yes  -SA       Subjective Patient Observations  Pt with harsh voice, frequent cough  -SA       Pertinent History Of Current Problem  Hx of dysphagia following head neck ca  -SA       Current Diet Limitations NPO  -NB NPO  -SA       Prior Level of Function- Swallowing  diet modifications- foods;compensations (maneuvers, postures)  -SA       Plans/Goals Discussed With patient  -NB patient  -SA       Barriers to Rehab medically complex  -NB previous functional deficit  -SA       Clinical Impression    Patient's Goals For Discharge return to PO diet  -NB  return to regular diet  -       SLP Swallowing Diagnosis moderate dysphagia;severe dysphagia;oral dysfunction;pharyngeal dysfunction  -NB severe dysphagia  -       Rehab Potential/Prognosis, Swallowing  fair, will monitor progress closely  -       Criteria for Skilled Therapeutic Interventions Met current level of function same as previous level of function  -NB skilled criteria for dysphagia intervention met  -       FCM, Swallowing  1-->Level 1  -SA       Therapy Frequency evaluation only  -NB PRN  -SA       Predicted Duration Therapy Interv (days)  until discharge  -       SLP Diet Recommendation NPO: unsafe for food/liquid intake;II - pureed;thin liquids;other (see comments)   PO for QOL per pt wishes  -NB NPO: unsafe for food/liquid intake;temporary alternative means of nutrition/hydration;long-term alternative means of nutrition/hydration  -       Recommended Diagnostics  VFSS (MBS)  -       Recommended Feeding/Eating Techniques no straws;small sips/bites;other (see comments)   throat clear after 2-3 bites & drinks  -NB        SLP Rec. for Method of Medication Administration meds via alternate route  -NB meds via alternate route  -       Monitor For Signs Of Aspiration cough;gurgly voice;throat clearing  -NB        Anticipated Discharge Disposition skilled nursing facility  -NB        Pain Assessment    Pain Assessment No/denies pain  -NB No/denies pain  -       Cognitive Assessment/Intervention    Current Cognitive/Communication Assessment impaired   poor recall  -NB        Orientation Status oriented to;place  -NB        Follows Commands/Answers Questions 100% of the time;able to follow single-step instructions  -NB        Oral Motor Structure and Function    Oral Motor Anatomy and Physiology patient demonstrates anatomy that is WNL  -NB        Dentition Assessment present and adequate  -NB        Secretion Management WNL/WFL  -NB        Mucosal Quality moist, healthy  -NB        Velar  Elevation other (see comments)   CNA  -NB        Volitional Swallow mild to moderate difficulties initiating volitional swallow  -NB        Volitional Cough no difficulties initiating volitional cough  -NB        Oral Musculature General Assessment WNL (within normal limits)  -NB        Oral Motor Assessment Comment  Harsh voice  -SA       SLP Communication to Radiology    Summary Statement Moderate to severe oropharyngeal dypshagia characterized by posterior spillage resulting in deep silent penetration of thin liquids before, during & after the swallow.  Eventual trace aspiration occurred during & after the swallow w/a throat clear in response.  With nectar thick, honey thick & puree deep silent penetration occurred before, during & after the swallow; although not observed will likely aspriate over time.   -NB          User Key  (r) = Recorded By, (t) = Taken By, (c) = Cosigned By    Initials Name Effective Dates    MIRLANDE Cervantes MS [facility]-SLP 04/13/15 -     SA Dorothy Shelton 12/11/15 -         EDUCATION  The patient has been educated in the following areas:   Dysphagia (Swallowing Impairment).    SLP Recommendation and Plan  SLP Swallowing Diagnosis: moderate dysphagia, severe dysphagia, oral dysfunction, pharyngeal dysfunction  SLP Diet Recommendation: NPO: unsafe for food/liquid intake, II - pureed, thin liquids, other (see comments) (PO for QOL per pt wishes)  Recommended Feeding/Eating Techniques: no straws, small sips/bites, other (see comments) (throat clear after 2-3 bites & drinks)  SLP Rec. for Method of Medication Administration: meds via alternate route  Monitor For Signs Of Aspiration: cough, gurgly voice, throat clearing     Criteria for Skilled Therapeutic Interventions Met: current level of function same as previous level of function  Anticipated Discharge Disposition: skilled nursing facility     Therapy Frequency: evaluation only             Plan of Care Review  Plan Of Care Reviewed With:  patient  Progress: no change  Outcome Summary/Follow up Plan: VFSS: pt is not safe for PO; wishes to eat for QOL.  Puree & thin; but at great risk to aspriate.  SLP to s/o, pt is at baseline.           IP SLP Goals       02/06/17 0823 02/04/17 1439       Begin to Take Some PO Safely    Begin to Take Some PO Safely- SLP, Date Established  02/04/17  -SA     Begin to Take Some PO Safely- SLP, Time to Achieve  by discharge  -SA     Begin to Take Some PO Safely- SLP, Additional Goal NPO is safest option, but pt wishes to eat for QOL.   -NB      Begin to Take Some PO Safely- SLP, Date Goal Reviewed 02/06/17  -NB      Begin to Take Some PO Safely- SLP, Outcome other (see comments)   no change from baseline PTA.   -NB        User Key  (r) = Recorded By, (t) = Taken By, (c) = Cosigned By    Initials Name Provider Type    MIRLANDE Cervantes MS CCC-SLP Speech and Language Pathologist     Dorothy Shelton Speech and Language Pathologist             SLP Outcome Measures (last 72 hours)      SLP Outcome Measures       02/06/17 0826 02/04/17 1400       SLP Outcome Measures    Outcome Measure Used? Adult NOMS  -NB Adult NOMS  -SA     FCM Scores    FCM Chosen Swallowing  -NB Swallowing  -SA     Swallowing FCM Score 1  -NB 1  -SA       User Key  (r) = Recorded By, (t) = Taken By, (c) = Cosigned By    Initials Name Effective Dates    MIRLANDE Cervantes MS CCC-SLP 04/13/15 -     SA Dorothy Shelton 12/11/15 -            Time Calculation:         Time Calculation- SLP       02/06/17 0857          Time Calculation- SLP    SLP Received On 02/06/17  -NB        User Key  (r) = Recorded By, (t) = Taken By, (c) = Cosigned By    Initials Name Provider Type    MIRLANDE Cervantes MS CCC-SLP Speech and Language Pathologist          Therapy Charges for Today     Code Description Service Date Service Provider Modifiers Qty    [id number] HC ST MOTION FLUORO EVAL SWALLOW 5 2/6/2017 Kristyn Cervantes MS CCC-SLP GN 1               Kristyn Cervantes MS  CCC-SLP  2/6/2017

## 2017-02-06 NOTE — PLAN OF CARE
Problem: Nutrition, Enteral (Adult)  Goal: Signs and Symptoms of Listed Potential Problems Will be Absent or Manageable (Nutrition, Enteral)  Outcome: Ongoing (interventions implemented as appropriate)

## 2017-02-06 NOTE — PROGRESS NOTES
"Pharmacokinetic Consult - Vancomycin Dosing (Follow-up Note)    Neville Mendez is on day 4 pharmacy to dose vancomycin for pneumonia per consult of Dr. Burgess.  Goal trough: 15-20 mg/L     Relevant clinical data and objective history reviewed:  66 y.o. male 70\" (177.8 cm) 147 lb (66.7 kg)   Nursing home resident.    Past Medical History   Diagnosis Date   • SCAR (acute kidney injury)    • Brain cancer      caancer head, neck and face   • COPD (chronic obstructive pulmonary disease)    • Coronary artery disease    • Dysphagia    • Hiatal hernia    • Hyperlipidemia    • Hypertension    • Malignant neoplasm of head    • Squamous cell carcinoma      of neck     CREATININE   Date Value Ref Range Status   02/06/2017 0.61 (L) 0.76 - 1.27 mg/dL Final   02/05/2017 0.50 (L) 0.76 - 1.27 mg/dL Final   02/04/2017 0.55 (L) 0.76 - 1.27 mg/dL Final     BUN   Date Value Ref Range Status   02/06/2017 6 (L) 8 - 23 mg/dL Final     Estimated Creatinine Clearance: 83.4 mL/min (by C-G formula based on Cr of 0.61).    Lab Results   Component Value Date    WBC 11.33 (H) 02/06/2017     Temp Readings from Last 3 Encounters:   02/05/17 98.5 °F (36.9 °C) (Oral)   01/23/17 98.1 °F (36.7 °C) (Oral)     Baseline culture/source/susceptibility: blood= NG x 2 days.    IV Anti-Infectives     Ordered     Dose/Rate Route Frequency Start Stop    02/04/17 1702  vancomycin 1250 mg/250 mL 0.9% NS IVPB ([facility])     Ordering Provider:  Robin Corcoran MD    1,250 mg Intravenous Every 12 Hours 02/04/17 1745      02/03/17 1924  piperacillin-tazobactam (ZOSYN) 3.375 g in dextrose 50 mL IVPB (premix)     Ordering Provider:  Willian Burgess MD    3.375 g Intravenous Every 8 Hours 02/04/17 0200      02/03/17 1924  Pharmacy to dose vancomycin     Ordering Provider:  Willian Burgess MD     Does not apply Continuous PRN 02/03/17 1923           Lab Results   Component Value Date    VANCOTROUGH 14.40 02/06/2017     Assessment/Plan  Current regimen: " vancomycin 1250mg (~19mg/kg) IV q12h.   Trough= 14.4mcg/ml prior to dose this AM. Continue same regimen, as some further accumulation can be anticipated. Pharmacy will continue to follow daily while on vancomycin and adjust as needed.     Lory Del Valle, PharmD  2/6/2017  8:12 AM

## 2017-02-06 NOTE — PLAN OF CARE
Problem: Patient Care Overview (Adult)  Goal: Plan of Care Review  Outcome: Ongoing (interventions implemented as appropriate)  Goal: Adult Individualization and Mutuality  Outcome: Ongoing (interventions implemented as appropriate)  Goal: Discharge Needs Assessment  Outcome: Ongoing (interventions implemented as appropriate)    Problem: Fall Risk (Adult)  Goal: Absence of Falls  Outcome: Ongoing (interventions implemented as appropriate)    Problem: Pneumonia (Adult)  Goal: Signs and Symptoms of Listed Potential Problems Will be Absent or Manageable (Pneumonia)  Outcome: Ongoing (interventions implemented as appropriate)    Problem: Nutrition, Enteral (Adult)  Goal: Signs and Symptoms of Listed Potential Problems Will be Absent or Manageable (Nutrition, Enteral)  Outcome: Ongoing (interventions implemented as appropriate)

## 2017-02-06 NOTE — CONSULTS
Adult Nutrition  Assessment/PES    Patient Name:  Neville Mendez  YOB: 1950  MRN: [id number]  Admit Date:  2/3/2017    Assessment Date:  2/6/2017     Consult for TF's. Noted pt refusing to have feeds run during the day. Adjusted back to NH nocturnal TF regimen of Jevity 1.2 @ 85ml/hr from 7pm to 7am w/ 200ml H2O q 4 hrs. TF's would meet 61% kcal and 84% Pro needs.     RD to follow/monitor hospital course. Thanks.         Reason for Assessment       02/06/17 1432    Reason for Assessment    Reason For Assessment/Visit physician consult;TF/PN;follow up protocol                  Labs/Tests/Procedures/Meds       02/06/17 1440    Labs/Tests/Procedures/Meds    Diagnostic Test/Procedure Review reviewed    Labs/Tests Review Reviewed;K+;BUN;Creat    Procedure Review SLP    Swallow eval status Done    Type of SLP Evaluation VFSS   TODAY    Medication Review Reviewed, pertinent    Significant Vitals reviewed            Physical Findings       02/06/17 1441    Physical Findings/Assessment    Additional Documentation Physical Appearance (Group)    Physical Appearance    Skin other (see comments);poor skin integrity/turgor   G-tube site with peristomal irritation. WOCN to see and eval. PEG site dressed.  Holister clamp to prevent leakage. Tube NOT replaced. B = 19.              Nutrition Prescription Ordered       02/06/17 1443    Nutrition Prescription PO    Current PO Diet NPO   Unless MD/POA allow pleasure feeds for QOL.     Nutrition Prescription EN    Enteral Route PEG    Product Jevity 1.2 tony    TF Delivery Method Continuous    Continuous TF Current Rate (mL/hr) 0 mL/hr   was going at 40ml/hr but pt REFUSES to have TF's running during the day and turned off himself.                 Problem/Interventions:        Problem 1       02/06/17 1444    Nutrition Diagnoses Problem 1    Signs/Symptoms (evidenced by) EN Intake Delivery;Report/Observation;SLP/Swallow eval    Reported/Observed By RN;MD;SLP    Swallow eval  status Done    Type of SLP Evaluation VFSS                    Intervention Goal       02/06/17 1444    Intervention Goal    General Maintain nutrition;Nutrition support treatment;Improved nutrition related lab(s)    TF/PN Inititiate TF/PN;Establish TF tolerance;Tolerate TF at goal    Weight Maintain weight            Nutrition Intervention       02/06/17 1445    Nutrition Intervention    RD/Tech Action Follow Tx progress;Care plan reviewd;Recommend/ordered    Recommended/Ordered EN            Nutrition Prescription       02/06/17 1445    Nutrition Prescription EN    Enteral Route PEG    Product Jevity 1.2 tony    TF Delivery Method Cyclic    Cyclic TF Goal Volume (mL) 85 mL    Cyclic TF Cycle Over (hrs)  7pm-7am    Water flush (mL)  200 mL    Water Flush Frequency Every 4 hours    New EN Prescription Ordered? Yes    EN to Supply    Kcal/Day 1224 Kcal/Day    Protein (gm/day) 56 gm/day    Meet Estimated Kcal Need (%) 61 %    Meet Estimated Protein Need (%) 84 %    TF Free H2O (mL) 823 mL    Total Free H2O (mL/day) 1423 mL/day            Education/Evaluation       02/06/17 1446    Education    Education Will Instruct as appropriate    Monitor/Evaluation    Monitor Per protocol;I&O;Pertinent labs;TF delivery/tolerance;Weight;Skin status;Symptoms    Education Follow-up Reinforce PRN        Comments:      Electronically signed by:  Nancy Michael RD  02/06/17 2:50 PM

## 2017-02-06 NOTE — PROGRESS NOTES
Acute Care - Physical Therapy Treatment Note  [facility]     Patient Name: Neville Mendez  : 1950  MRN: [id number]  Today's Date: 2017  Onset of Illness/Injury or Date of Surgery Date: 17  Date of Referral to PT: 17  Referring Physician: Nilo    Admit Date: 2/3/2017    Visit Dx:    ICD-10-CM ICD-9-CM   1. Pneumonia of right lower lobe due to infectious organism J18.9 483.8   2. Bleeding from g-tube R58 459.0   3. Difficulty walking R26.2 719.7     Patient Active Problem List   Diagnosis   • Pneumonia of right lower lobe due to infectious organism               Adult Rehabilitation Note       17 0900 17 1000       Rehab Assessment/Intervention    Discipline physical therapy assistant  -CW physical therapy assistant  -     Document Type therapy note (daily note)  -CW therapy note (daily note)  -     Subjective Information agree to therapy;no complaints  -CW no complaints  -RH     Patient Effort, Rehab Treatment good  -CW      Symptoms Noted During/After Treatment  none  -RH     Precautions/Limitations fall precautions  -CW      Precautions/Limitations, Vision  WFL  -RH     Precautions/Limitations, Hearing  WFL  -RH     Recorded by [CW] Magdiel Alcaraz [RH] Jimy West PTA     Vital Signs    Post SpO2 (%)  98  -RH     O2 Delivery Post Treatment  room air  -RH     Recorded by  [RH] Jimy West PTA     Pain Assessment    Pain Assessment No/denies pain  -CW No/denies pain  -RH     Recorded by [CW] Magdiel Alcaraz [RH] Jimy West PTA     Cognitive Assessment/Intervention    Current Cognitive/Communication Assessment functional  -CW functional  -RH     Orientation Status oriented x 4  -CW oriented x 4  -RH     Follows Commands/Answers Questions 100% of the time;able to follow single-step instructions;needs cueing  -% of the time;needs cueing  -RH     Personal Safety WNL/WFL  -CW WNL/WFL  -RH     Personal Safety Interventions fall prevention  program maintained;gait belt;nonskid shoes/slippers when out of bed  -CW nonskid shoes/slippers when out of bed  -RH     Recorded by [] Magdiel Alcaraz [] Jimy West PTA     ROM (Range of Motion)    General ROM  no range of motion deficits identified  -RH     Recorded by  [] Jimy West PTA     Bed Mobility, Assessment/Treatment    Bed Mob, Supine to Sit, [place] conditional independence  -CW conditional independence  -RH     Bed Mob, Sit to Supine, [place] conditional independence  -CW conditional independence  -RH     Recorded by [] Magdiel Alcaraz [] Jimy West, LINWOOD     Transfer Assessment/Treatment    Transfers, Sit-Stand [place] conditional independence  -CW conditional independence  -RH     Transfers, Stand-Sit [place] conditional independence  -CW conditional independence  -RH     Transfers, Sit-Stand-Sit, Assist Device rolling walker  -CW rolling walker  -RH     Recorded by [] Magdiel Alcaraz [] Jimy West, LINWOOD     Gait Assessment/Treatment    Gait, [place] Level stand by assist  - stand by assist  -RH     Gait, Assistive Device rolling walker  -CW rolling walker  -RH     Gait, Distance (Feet) 200  -  -RH     Gait, Gait Deviations  lissa decreased;forward flexed posture  -RH     Gait, Impairments  strength decreased;impaired balance  -RH     Recorded by [] Magdiel Alcaraz [] Jimy West PTA     Functional Mobility    Functional Mobility- Ind. Level  supervision required;contact guard assist  -RH     Recorded by  [] Jimy West PTA     Balance Skills Training    Sitting-Level of Assistance  Independent  -RH     Sitting-Balance Support  Feet supported  -RH     Standing-Level of Assistance  Close supervision  -RH     Static Standing Balance Support  assistive device  -RH     Recorded by  [] Jimy West PTA     Positioning and Restraints    Pre-Treatment Position in bed  -CW in bed  -RH     Post  Treatment Position bed  -CW bed  -RH     In Bed sitting EOB;call light within reach;encouraged to call for assist  -CW sitting EOB;call light within reach;exit alarm on  -RH     Recorded by [CW] Magdiel Alcaraz [RH] Jimy West PTA       User Key  (r) = Recorded By, (t) = Taken By, (c) = Cosigned By    Initials Name Effective Dates     Jimy West, PTA 02/18/16 -     CW Magdiel Alcaraz 12/13/16 -                 IP PT Goals       02/04/17 1521          Bed Mobility PT STG    Bed Mobility PT STG, Date Established 02/04/17  -SV      Bed Mobility PT STG, Time to Achieve 1 wk  -SV      Bed Mobility PT STG, Activity Type all bed mobility  -SV      Bed Mobility PT STG, [place] Level independent  -SV      Transfer Training PT STG    Transfer Training PT STG, Date Established 02/04/17  -SV      Transfer Training PT STG, Time to Achieve 1 wk  -SV      Transfer Training PT STG, Activity Type all transfers  -SV      Transfer Training PT STG, [place] Level independent   least vs no AD  -SV      Gait Training PT STG    Gait Training Goal PT STG, Date Established 02/04/17  -SV      Gait Training Goal PT STG, Time to Achieve 1 wk  -SV      Gait Training Goal PT STG, [place] Level independent  -SV      Gait Training Goal PT STG, Assist Device --   least vs no AD  -SV      Gait Training Goal PT STG, Distance to Achieve 300  -SV        User Key  (r) = Recorded By, (t) = Taken By, (c) = Cosigned By    Initials Name Provider Type     Zaria Marvin, PT Physical Therapist          Physical Therapy Education     Title: PT OT SLP Therapies (Done)     Topic: Physical Therapy (Done)     Point: Mobility training (Done)    Learning Progress Summary    Learner Readiness Method Response Comment Documented by Status   Patient Acceptance E,TB DU,ALEX  CW 02/06/17 0946 Done    Acceptance E,D NR,ALEX  SV 02/04/17 1520 Done               Point: Home exercise program (Done)    Learning Progress Summary    Learner  Readiness Method Response Comment Documented by Status   Patient Acceptance E,TB DU,VU  CW 02/06/17 0946 Done    Acceptance E,D NR,VU  SV 02/04/17 1520 Done                      User Key     Initials Effective Dates Name Provider Type [facility]    SV 01/17/16 -  Zaria Marvin, PT Physical Therapist PT    CW 12/13/16 -  Magdiel Alcaraz Physical Therapy Assistant PT                    PT Recommendation and Plan  Anticipated Discharge Disposition: skilled nursing facility  Plan of Care Review  Plan Of Care Reviewed With: patient  Progress: progress toward functional goals as expected  Outcome Summary/Follow up Plan: Pt is able to get in and out of bed with no A and amb with RWX SBA          Outcome Measures       02/06/17 0900 02/05/17 1000 02/04/17 1500    How much help from another person do you currently need...    Turning from your back to your side while in flat bed without using bedrails? 4  -CW 4  -RH 4  -SV    Moving from lying on back to sitting on the side of a flat bed without bedrails? 4  -CW 4  -RH 4  -SV    Moving to and from a bed to a chair (including a wheelchair)? 3  -CW 3  -RH 3  -SV    Standing up from a chair using your arms (e.g., wheelchair, bedside chair)? 3  -CW 3  -RH 3  -SV    Climbing 3-5 steps with a railing? 3  -CW 3  -RH 3  -SV    To walk in hospital room? 3  -CW 3  -RH 3  -SV    AM-PAC 6 Clicks Score 20  -CW 20  -RH 20  -SV    Functional Assessment    Outcome Measure Options AM-PAC 6 Clicks Basic Mobility (PT)  -CW  AM-PAC 6 Clicks Basic Mobility (PT)  -SV      User Key  (r) = Recorded By, (t) = Taken By, (c) = Cosigned By    Initials Name Provider Type     Jimy West, PTA Physical Therapy Assistant    SV Zaria Marvin, PT Physical Therapist    CW Magdiel Alcaraz Physical Therapy Assistant           Time Calculation:         PT Charges       02/06/17 0947          Time Calculation    Start Time 0915  -CW      Stop Time 0930  -CW      Time Calculation (min) 15 min  -CW       PT Received On 02/06/17  -CW      PT - Next Appointment 02/07/17  -AWA        User Key  (r) = Recorded By, (t) = Taken By, (c) = Cosigned By    Initials Name Provider Type    CW Magdiel Alcaraz Physical Therapy Assistant          Therapy Charges for Today     Code Description Service Date Service Provider Modifiers Qty    94104988558 HC PT THER SUPP EA 15 MIN 2/6/2017 Magdiel Alcaraz GP 1    27516837602 HC PT THER PROC EA 15 MIN 2/6/2017 Magdiel Alcaraz GP 1          PT G-Codes  Outcome Measure Options: AM-PAC 6 Clicks Basic Mobility (PT)    Magdiel Alcaraz  2/6/2017

## 2017-02-06 NOTE — PLAN OF CARE
Problem: Patient Care Overview (Adult)  Goal: Plan of Care Review  Outcome: Ongoing (interventions implemented as appropriate)    02/06/17 0925   Coping/Psychosocial Response Interventions   Plan Of Care Reviewed With patient   Patient Care Overview   Progress progress toward functional goals as expected   Outcome Evaluation   Outcome Summary/Follow up Plan Pt is able to get in and out of bed with no A and amb with RWX SBA

## 2017-02-06 NOTE — PAYOR COMM NOTE
"Neville Mendez (66 y.o. Male)            ATTENTION; MARYANN BLOCK LPN, CLINICALS FOR YOUR REVIEW, REPLY TO UR DEPT, BEBE FARFAN LPN        [phone] OR  [phone]      Date of Birth Social Security Number Address Home Phone MRN    1950  [id number] [facility] [address] [phone] [id number]    Worship Marital Status          None        Admission Date Admission Type Admitting Provider Attending Provider Department, Room/Bed    2/3/17 Emergency Willian Burgess MD Baumann, Patrick D, MD [address], 414/1    Discharge Date Discharge Disposition Discharge Destination                      Attending Provider: Boaz Estrella MD     Allergies:  No Known Allergies    Isolation:  None   Infection:  None   Code Status:  FULL    Ht:  70\" (177.8 cm)   Wt:  147 lb (66.7 kg)    Admission Cmt:  None   Principal Problem:  None                Active Insurance as of 2/3/2017     Primary Coverage     Payor Plan Insurance Group Employer/Plan Group    [facility] RE[facility]      Payor Plan Address Payor Plan Phone Number Effective From Effective To    [address] [address] [phone] 2017     [place] [address]       Subscriber Name Subscriber Birth Date Member ID       NEVILLE MENDEZ 1950 [id number]                 Emergency Contacts      (Rel.) Home Phone Work Phone Mobile Phone    Yas Henao (Sister) [phone] -- [phone]               History & Physical      Willian Burgess MD at 2/3/2017  7:15 PM              Patient Identification:  Name: Neville Mendez  Age/Sex: 66 y.o. male  :  1950  MRN: [id number]         Primary Care Physician: No Known Provider    Chief Complaint   Patient presents with   • Tube Change       Subjective   Patient is a 66 y.o. male with a history of head and neck cancer s/p surgical excision and chemotherapy and G tube placement who presents from [facility] stating that " he wanted his g tube to be taken out. Patient is a poor historian when it comes to his medical history but according to the ED staff he was sent here because they noticed some bleeding around his G tube site. Patient states he cannot remember who or where or why he had the G tube placed. He states that he eats a pureed diet at the NH and gets nocturnal tube feedings. He states that the tube is bothering him and he wants it taken out. He denies any dysphagia. He does have minimal cough. No fevers or chills. States he used to be on thickened liquids but was told he could do a pureed diet. He was found to have RLL pneumonia in the ED.     History of Present Illness    Past Medical History   Diagnosis Date   • SCAR (acute kidney injury)    • Brain cancer      caancer head, neck and face   • COPD (chronic obstructive pulmonary disease)    • Coronary artery disease    • Dysphagia    • Hiatal hernia    • Hyperlipidemia    • Hypertension    • Malignant neoplasm of head    • Squamous cell carcinoma      of neck     Past Surgical History   Procedure Laterality Date   • Spine surgery     • Cardiac surgery       History reviewed. No pertinent family history.  Social History   Substance Use Topics   • Smoking status: Former Smoker   • Smokeless tobacco: None      Comment: stopped about a year ago   • Alcohol use No       (Not in a hospital admission)  Allergies:  Review of patient's allergies indicates no known allergies.    Review of Systems   Constitutional: Negative.    HENT: Negative.    Eyes: Negative.    Respiratory: Positive for cough. Negative for choking and shortness of breath.    Cardiovascular: Negative.    Gastrointestinal: Negative.         Blood around G tube site   Endocrine: Negative.    Genitourinary: Negative.    Musculoskeletal: Negative.    Skin: Negative.    Neurological: Negative.    Hematological: Negative.    Psychiatric/Behavioral: Negative.         Objective    Vital Signs  Temp:  [99.1 °F (37.3 °C)]  99.1 °F (37.3 °C)  Heart Rate:  [63-92] 63  Resp:  [18] 18  BP: (111-136)/(58-93) 130/66  Body mass index is 22.96 kg/(m^2).    Physical Exam   Constitutional: He is oriented to person, place, and time. He appears well-developed and well-nourished.   HENT:   Head: Normocephalic and atraumatic.   Mouth/Throat: No oropharyngeal exudate.   Eyes: Conjunctivae are normal. No scleral icterus.   Neck: Normal range of motion. No JVD present. No tracheal deviation present.   Cardiovascular: Normal rate and regular rhythm.    No murmur heard.  Pulmonary/Chest: Effort normal.   Crackles at right base   Abdominal: Soft. Bowel sounds are normal. He exhibits no distension. There is no tenderness.   G tube in place with dried dark blood around site of insertion into skin   Musculoskeletal: Normal range of motion. He exhibits no edema.   Neurological: He is alert and oriented to person, place, and time.   Skin: Skin is warm and dry.   Psychiatric: He has a normal mood and affect. His behavior is normal. Judgment and thought content normal.       Results Review:   I reviewed the patient's new clinical results.  Imaging Results (last 24 hours)     Procedure Component Value Units Date/Time    XR Chest 1 View [[id number]] Collected:  02/03/17 1627     Updated:  02/03/17 1630    Narrative:       EMERGENCY CHEST SINGLE VIEW     HISTORY: 66-year-old male with cough     COMPARISON: 12/08/2014     FINDINGS:  1. Acute right base pneumonia.  2. No other change.  3. Recommend follow-up to confirm resolution     This report was finalized on 2/3/2017 4:27 PM by Dr. Willy Aden MD.             Assessment/Plan     Active Problems:    Pneumonia of right lower lobe due to infectious organism      Assessment & Plan  1. RLL Pneumonia  - likely aspiration  - leukocytosis but does not meet sepsis criteria  - treating with Vanc and Zosyn as he is from nursing facility  - check MRSA culture, if negative consider discontinuing Vanc  - check strep and  legionella urinary antigens  - swallow study, NPO until then    2. G tube bleeding  - Will get GI to come see him  - may need G tube study to look for any leaks or if he passes swallow study may be able to have it removed completely  - will get nutrition assessment for now until final decision on whether to remove it or leave it in place  - monitor H/H, doesn't appear to have any major bleeding when I saw it  - will place on maintenance fluid for now    3. Hyponatremia  - unclear if he was getting free water with tube feeds or not  - will give D5NS, recheck in am    4. Hyperglycemia  - check A1c        I discussed the patients findings and my recommendations with patient.          Willian Burgess MD  [facility]  02/03/17  7:15 PM         Electronically signed by Willian Burgess MD at 2/3/2017  7:37 PM           Emergency Department Notes      Anil Ho MD at 2/3/2017  4:01 PM           EMERGENCY DEPARTMENT ENCOUNTER    CHIEF COMPLAINT  Chief Complaint: Tube Change  History given by: Pt  History limited by: nothing  Room Number: 32/32  PMD: No Known Provider  NH: [facility]    HPI:  Pt is a 66 y.o. male who presents via [facility] complaining of bleeding around his g-tube, which he uses for nutrition, which started last night while sleeping. Pt states his tube has never been changed out and may be up to one year old. Pt denies recent illness or any damage to the tube. Pt also denies seeing any blood in the tube. Pt states he has a hx of throat cancer, which was treated with surgery. Pt also c/o a cough with yellow sputum for a week.    Duration:  Several hours  Onset: unknown  Timing: constant  Location: g-tube  Radiation: none  Quality: bleeding  Intensity/Severity: moderate  Progression: unchanged  Associated Symptoms: Pt has had a cough with yellow sputum for a week.  Aggravating Factors: none  Alleviating Factors: none  Previous Episodes:  none  Treatment before arrival: none      Past Medical History   Diagnosis Date   • Brain cancer    • COPD (chronic obstructive pulmonary disease)    • Coronary artery disease    • Dysphagia    • Hiatal hernia    • Hyperlipidemia    • Hypertension        PAST SURGICAL HISTORY  Past Surgical History   Procedure Laterality Date   • Spine surgery     • Cardiac surgery         FAMILY HISTORY  History reviewed. No pertinent family history.    SOCIAL HISTORY  Social History     Social History   • Marital status:      Spouse name: N/A   • Number of children: N/A   • Years of education: N/A     Occupational History   • Not on file.     Social History Main Topics   • Smoking status: Former Smoker   • Smokeless tobacco: Not on file      Comment: stopped about a year ago   • Alcohol use Not on file   • Drug use: Not on file   • Sexual activity: Not on file     Other Topics Concern   • Not on file     Social History Narrative   • No narrative on file       ALLERGIES  Review of patient's allergies indicates no known allergies.    REVIEW OF SYSTEMS  Review of Systems   Constitutional: Negative for activity change, appetite change and fever.   HENT: Negative for congestion and sore throat.    Eyes: Negative.    Respiratory: Positive for cough (with yellow sputum). Negative for shortness of breath.    Cardiovascular: Negative for chest pain and leg swelling.   Gastrointestinal: Negative for abdominal pain, diarrhea and vomiting.   Endocrine: Negative.    Genitourinary: Negative for decreased urine volume and dysuria.   Musculoskeletal: Negative for neck pain.   Skin: Positive for wound (bleeding around g-tube site). Negative for rash.   Allergic/Immunologic: Negative.    Neurological: Negative for weakness, numbness and headaches.   Hematological: Negative.    Psychiatric/Behavioral: Negative.    All other systems reviewed and are negative.      PHYSICAL EXAM  ED Triage Vitals   Temp Heart Rate Resp BP SpO2   02/03/17 1523  02/03/17 1523 02/03/17 1523 02/03/17 1523 02/03/17 1523   99.1 °F (37.3 °C) 92 18 135/93 95 %      Temp src Heart Rate Source Patient Position BP Location FiO2 (%)   02/03/17 1523 02/03/17 1523 02/03/17 1523 02/03/17 1523 --   Tympanic Monitor Lying Right arm        Physical Exam   Constitutional: He is oriented to person, place, and time and well-developed, well-nourished, and in no distress.   HENT:   Head: Normocephalic and atraumatic.   Eyes: EOM are normal. Pupils are equal, round, and reactive to light.   Neck: Normal range of motion. Neck supple.   Cardiovascular: Normal rate, regular rhythm and normal heart sounds.    HR=80s   Pulmonary/Chest: Effort normal. No respiratory distress. He has rhonchi (mild) in the right lower field.   Pt has a cough   Abdominal: Soft. He exhibits no distension. There is no tenderness. There is no rebound and no guarding.   g-tube in left upper quadrant with bleeding around it and dressing in place which is saturated in blood.   Musculoskeletal: Normal range of motion. He exhibits no edema.   Neurological: He is alert and oriented to person, place, and time. He has normal sensation and normal strength.   Skin: Skin is warm and dry.   Psychiatric: Mood and affect normal.   Nursing note and vitals reviewed.      LAB RESULTS  Lab Results (last 24 hours)     Procedure Component Value Units Date/Time    CBC & Differential [[id number]] Collected:  02/03/17 1651    Specimen:  Blood Updated:  02/03/17 1730    Narrative:       The following orders were created for panel order CBC & Differential.  Procedure                               Abnormality         Status                     ---------                               -----------         ------                     CBC Auto Differential[[id number]]         Abnormal            Final result                 Please view results for these tests on the individual orders.    Comprehensive Metabolic Panel [[id number]]  (Abnormal) Collected:   02/03/17 1651    Specimen:  Blood Updated:  02/03/17 1742     Glucose 135 (H) mg/dL      BUN 13 mg/dL      Creatinine 0.61 (L) mg/dL      Sodium 132 (L) mmol/L      Potassium 3.9 mmol/L      Chloride 89 (L) mmol/L      CO2 32.5 (H) mmol/L      Calcium 9.7 mg/dL      Total Protein 8.2 g/dL      Albumin 3.50 g/dL      ALT (SGPT) 13 U/L      AST (SGOT) 16 U/L      Alkaline Phosphatase 84 U/L      Total Bilirubin 0.6 mg/dL      eGFR Non African Amer 131 mL/min/1.73      Globulin 4.7 gm/dL      A/G Ratio 0.7 g/dL      BUN/Creatinine Ratio 21.3      Anion Gap 10.5 mmol/L     Protime-INR [[id number]]  (Abnormal) Collected:  02/03/17 1651    Specimen:  Blood Updated:  02/03/17 1742     Protime 14.7 (H) Seconds      INR 1.19 (H)     CBC Auto Differential [[id number]]  (Abnormal) Collected:  02/03/17 1651    Specimen:  Blood Updated:  02/03/17 1730     WBC 11.78 (H) 10*3/mm3      RBC 4.00 (L) 10*6/mm3      Hemoglobin 11.1 (L) g/dL      Hematocrit 35.1 (L) %      MCV 87.8 fL      MCH 27.8 pg      MCHC 31.6 (L) g/dL      RDW 14.7 (H) %      RDW-SD 47.4 fl      MPV 9.3 fL      Platelets 558 (H) 10*3/mm3      Neutrophil % 83.4 (H) %      Lymphocyte % 8.1 (L) %      Monocyte % 7.2 %      Eosinophil % 0.9 %      Basophil % 0.1 %      Immature Grans % 0.3 %      Neutrophils, Absolute 9.81 (H) 10*3/mm3      Lymphocytes, Absolute 0.96 10*3/mm3      Monocytes, Absolute 0.85 10*3/mm3      Eosinophils, Absolute 0.11 10*3/mm3      Basophils, Absolute 0.01 10*3/mm3      Immature Grans, Absolute 0.04 (H) 10*3/mm3     Procalcitonin [[id number]]  (Normal) Collected:  02/03/17 1651    Specimen:  Blood Updated:  02/03/17 1746     Procalcitonin 0.12 ng/mL     Narrative:       As a Marker for Sepsis (Non-Neonates):   1. <0.5 ng/mL represents a low risk of severe sepsis and/or septic shock.  1. >2 ng/mL represents a high risk of severe sepsis and/or septic shock.    As a Marker for Lower Respiratory Tract Infections that require antibiotic therapy:  PCT  "on Admission     Antibiotic Therapy             6-12 Hrs later  > 0.5                Strongly Recommended            >0.25 - <0.5         Recommended  0.1 - 0.25           Discouraged                   Remeasure/reassess PCT  <0.1                 Strongly Discouraged          Remeasure/reassess PCT      As 28 day mortality risk marker: \"Change in Procalcitonin Result\" (> 80 % or <=80 %) if Day 0 (or Day 1) and Day 4 values are available. Refer to http://www.[email or web address]-pct-calculator.com/   Change in PCT <=80 %   A decrease of PCT levels below or equal to 80 % defines a positive change in PCT test result representing a higher risk for 28-day all-cause mortality of patients diagnosed with severe sepsis or septic shock.  Change in PCT > 80 %   A decrease of PCT levels of more than 80 % defines a negative change in PCT result representing a lower risk for 28-day all-cause mortality of patients diagnosed with severe sepsis or septic shock.                Lactic Acid, Plasma [[id number]]  (Normal) Collected:  02/03/17 1713    Specimen:  Blood Updated:  02/03/17 1736     Lactate 1.5 mmol/L     Blood Culture [[id number]] Collected:  02/03/17 1713    Specimen:  Blood from Arm, Left Updated:  02/03/17 1719    Blood Culture [[id number]] Collected:  02/03/17 1713    Specimen:  Blood from Arm, Right Updated:  02/03/17 1719          I ordered the above labs and reviewed the results    RADIOLOGY  XR Chest 1 View   Final Result   1. Acute right base pneumonia.  2. No other change.  3. Recommend follow-up to confirm resolution        I ordered the above noted radiological studies. Interpreted by radiologist. Discussed with radiologist (Dr. Aden). Reviewed by me in PACS.       PROCEDURES  Procedures      PROGRESS AND CONSULTS  ED Course   1610  Ordered blood work, Protime-INR, and CXR for further analysis.    4:56 PM  Ordered Zocon and Vanc for healthcare acquired pneumonia.     5:13 PM  Rechecked with pt, who is resting comfortably. " Informed pt of CXR showing pneumonia and plan to admit to hospital and treat with abx. Informed pt of plan to have GI specialist consult on pt's g-tube. Pt understands and agrees with the plan and all questions were addressed.   Upon reevaluation, pt has minimal bleeding on tube site. Placed SurgiSeal, Thrombi-Pad, 4x4's, and abdominal dressing with good hemostasis. Ordered pt be placed on 2L O2.     5:48 PM  Discussed pt's case with Dr. Burgess, hospitalist, who agrees to admit the pt to telemetry for pneumonia and consult GI to see the pt in the morning.     5:54 PM  Rechecked with pt who is resting comfortably in no acute distress and no active bleeding. Informed pt that Dr. Burgess will admit him to telemetry.    MEDICAL DECISION MAKING  Results were reviewed/discussed with the patient and they were also made aware of online access. Pt also made aware that some labs, such as cultures, will not be resulted during ER visit and follow up with PMD is necessary.     MDM  Number of Diagnoses or Management Options  Bleeding from g-tube:   Pneumonia of right lower lobe due to infectious organism:      Amount and/or Complexity of Data Reviewed  Clinical lab tests: reviewed and ordered (Hemoglobin 11.1, WBC=11.78, lactate=1.5)  Tests in the radiology section of CPT®:  reviewed and ordered (CXR shows acute right base pneumonia with no other changes.)  Decide to obtain previous medical records or to obtain history from someone other than the patient: yes  Review and summarize past medical records: yes (Pt has a hx of neck and throat cancer. Pt was sent in my NH due to cough and leaking around g-tube site.)  Discuss the patient with other providers: yes (Dr. Burgess, hospitalist)           DIAGNOSIS  Final diagnoses:   Pneumonia of right lower lobe due to infectious organism   Bleeding from g-tube       DISPOSITION  ADMISSION    Discussed treatment plan and reason for admission with pt/family and admitting physician.   Pt/family voiced understanding of the plan for admission for further testing/treatment as needed.         Latest Documented Vital Signs:  As of 6:32 PM  BP- 130/66 HR- 69 Temp- 99.1 °F (37.3 °C) (Tympanic) O2 sat- 100%    --  Documentation assistance provided by gavino Page for Dr. Ho.  Information recorded by the scribe was done at my direction and has been verified and validated by me.    Anil Ho MD  02/03/17 1832       Electronically signed by Anil Ho MD at 2/3/2017  6:32 PM        Vital Signs (last 72 hrs)       02/03 0700  -  02/04 0659 02/04 0700  -  02/05 0659 02/05 0700  -  02/06 0659 02/06 0700  -  02/06 1112   Most Recent    Temp (°F) 98.3 -  99.1    98 -  98.5    97.5 -  98.5      98.9     98.9 (37.2)    Heart Rate 63 -  92    68 -  90    74 -  96      73     73    Resp 16 -  18    16 -  18    16 -  18      18     18    /82 -  136/71    112/59 -  135/73    106/80 -  153/80      126/83     126/83    SpO2 (%) (!)88 -  100    95 -  98    95 -  98      97     97          Lines, Drains & Airways    Active LDAs     Name:   Placement date:   Placement time:   Site:   Days:    Peripheral IV Line - Single Lumen 02/04/17 1544 basilic vein (medial side of arm), left 20 gauge  02/04/17    1544      1    Peripheral IV Line - Single Lumen 02/04/17 1646 median vein (underside of arm), left 20 gauge  02/04/17    1646      1    Naso/Oral/Gastric Tube 02/03/17 1738 other (see comments) 16 other (see comments)  02/03/17    1738      2         Inactive LDAs     Name:   Placement date:   Placement time:   Removal date:   Removal time:   Site:   Days:    [REMOVED] Peripheral IV Line - Single Lumen 02/03/17 1652 median cubital vein (antecubital fossa), left 20 gauge  02/03/17    1652    02/04/17    1545      less than 1                Hospital Medications (all)       Dose Frequency Start End    albuterol (PROVENTIL) nebulizer solution 0.083% 2.5 mg/3mL 2.5 mg Every 4 Hours PRN 2/5/2017     Sig  - Route: Take 2.5 mg by nebulization Every 4 (Four) Hours As Needed for wheezing. - Nebulization    aspirin chewable tablet 81 mg 81 mg Daily 2/3/2017     Sig - Route: 1 tablet by Per G Tube route Daily. - Per G Tube    calcium carbonate 1250 (500 CA) MG/5ML suspension 1,250 mg 1,250 mg 2 Times Daily With Meals 2/3/2017     Sig - Route: 5 mL by Per G Tube route 2 (Two) Times a Day With Meals. - Per G Tube    dextrose 5 % and sodium chloride 0.9 % infusion 75 mL/hr Continuous 2/3/2017     Sig - Route: Infuse 75 mL/hr into a venous catheter Continuous. - Intravenous    HYDROcodone-acetaminophen (NORCO) 5-325 MG per tablet 1 tablet 1 tablet Every 4 Hours PRN 2/3/2017     Sig - Route: Take 1 tablet by mouth Every 4 (Four) Hours As Needed for severe pain (7-10). - Oral    lisinopril (PRINIVIL,ZESTRIL) tablet 5 mg 5 mg Every 24 Hours Scheduled 2/4/2017     Sig - Route: 1 tablet by Per G Tube route Daily. - Per G Tube    mirtazapine (REMERON) tablet 7.5 mg 7.5 mg Nightly 2/3/2017     Sig - Route: 0.5 tablets by Per G Tube route Every Night. - Per G Tube    multivitamin (THERAGRAN) tablet 1 tablet 1 tablet Daily 2/4/2017     Sig - Route: 1 tablet by Per G Tube route Daily. - Per G Tube    Pharmacy to dose vancomycin  Continuous PRN 2/3/2017     Sig - Route: Continuous As Needed for consult. - Does not apply    piperacillin-tazobactam (ZOSYN) 3.375 g in dextrose 50 mL IVPB (premix) 3.375 g Every 8 Hours 2/4/2017     Sig - Route: Infuse 50 mL into a venous catheter Every 8 (Eight) Hours. - Intravenous    piperacillin-tazobactam (ZOSYN) 4.5 g in dextrose 100 mL IVPB (premix) 4.5 g Once 2/3/2017 2/3/2017    Sig - Route: Infuse 100 mL into a venous catheter 1 (One) Time. - Intravenous    polycarbophil (FIBERCON) tablet 625 mg 625 mg 2 Times Daily 2/3/2017     Sig - Route: 1 tablet by Per G Tube route 2 (Two) Times a Day. - Per G Tube    potassium chloride (KLOR-CON) packet 40 mEq 40 mEq As Needed 2/5/2017     Sig - Route: 40 mEq  "by Per G Tube route As Needed (potassium replacement, see admin instructions). - Per G Tube    Linked Group 1:  \"Or\" Linked Group Details        potassium chloride 10 mEq in 100 mL IVPB 10 mEq Every 1 Hour PRN 2/5/2017     Sig - Route: Infuse 100 mL into a venous catheter Every 1 (One) Hour As Needed (potassium protocol PERIPHERAL - see admin instructions). - Intravenous    Linked Group 1:  \"Or\" Linked Group Details        sodium chloride 0.9 % flush 1-10 mL 1-10 mL As Needed 2/3/2017     Sig - Route: Infuse 1-10 mL into a venous catheter As Needed for line care. - Intravenous    sodium chloride 0.9 % flush 10 mL 10 mL As Needed 2/3/2017     Sig - Route: Infuse 10 mL into a venous catheter As Needed for line care. - Intravenous    Linked Group 2:  \"And\" Linked Group Details        vancomycin 1250 mg/250 mL 0.9% NS IVPB ([facility]) 1,250 mg Every 12 Hours 2/4/2017     Sig - Route: Infuse 250 mL into a venous catheter Every 12 (Twelve) Hours. - Intravenous    vancomycin 1500 mg/500 mL 0.9% NS IVPB ([facility]) 20 mg/kg × 72.6 kg Once 2/3/2017 2/3/2017    Sig - Route: Infuse 500 mL into a venous catheter 1 (One) Time. - Intravenous    vitamin C (ASCORBIC ACID) tablet 500 mg 500 mg Daily 2/4/2017     Sig - Route: 1 tablet by Per G Tube route Daily. - Per G Tube    zolpidem (AMBIEN) tablet 2.5 mg 2.5 mg Nightly PRN 2/4/2017     Sig - Route: Take 0.5 tablets by mouth At Night As Needed for sleep. - Oral    albuterol (PROVENTIL HFA;VENTOLIN HFA) inhaler 2 puff (Discontinued) 2 puff Every 4 Hours PRN 2/3/2017 2/5/2017    Sig - Route: Inhale 2 puffs Every 4 (Four) Hours As Needed for wheezing. - Inhalation    Reason for Discontinue: Formulary change    dextrose 5 % and sodium chloride 0.45 % infusion (Discontinued) 100 mL/hr Continuous 2/3/2017 2/3/2017    Sig - Route: Infuse 100 mL/hr into a venous catheter Continuous. - Intravenous    oyster shell calcium tablet 500 mg (Discontinued) 500 mg 2 Times Daily 2/3/2017 2/3/2017    Sig - " "Route: 1 tablet by Per G Tube route 2 (Two) Times a Day. - Per G Tube    Reason for Discontinue: Formulary change    potassium chloride (KLOR-CON) packet 40 mEq (Discontinued) 40 mEq As Needed 2017    Sig - Route: Take 40 mEq by mouth As Needed (potassium replacement, see admin instructions). - Oral    Linked Group 1:  \"Or\" Linked Group Details        potassium chloride (MICRO-K) CR capsule 40 mEq (Discontinued) 40 mEq As Needed 2017    Sig - Route: Take 4 capsules by mouth As Needed (potassium replacement.  see admin instructions). - Oral    Linked Group 1:  \"Or\" Linked Group Details        potassium chloride 10 mEq in 100 mL IVPB (Discontinued) 10 mEq Every 1 Hour PRN 2017    Sig - Route: Infuse 100 mL into a venous catheter Every 1 (One) Hour As Needed (potassium protocol PERIPHERAL - see admin instructions). - Intravenous    Linked Group 1:  \"Or\" Linked Group Details        vancomycin (VANCOCIN) IVPB 1 g (premix) in Dextrose 5% 200 mL (Discontinued) 15 mg/kg × 72.6 kg Every 12 Hours 2017 2/3/2017    Sig - Route: Infuse 200 mL into a venous catheter Every 12 (Twelve) Hours. - Intravenous    Reason for Discontinue: Formulary change    vancomycin 1250 mg/250 mL 0.9% NS IVPB ([facility]) (Discontinued) 1,250 mg Every 12 Hours 2017    Sig - Route: Infuse 250 mL into a venous catheter Every 12 (Twelve) Hours. - Intravenous    zolpidem (AMBIEN) tablet 2.5 mg (Discontinued) 2.5 mg Daily 2/3/2017 2017    Sig - Route: Take 0.5 tablets by mouth Daily. - Oral             Physician Progress Notes (last 24 hours) (Notes from 2017 11:12 AM through 2017 11:12 AM)      Robin Corcoran MD at 2017 12:41 PM  Version 2 of 2         DAILY PROGRESS NOTE  [facility]    Patient Identification:  Name: Neville Mendez  Age: 66 y.o.  Sex: male  :  1950  MRN: [id number]         Primary Care Physician: No Known Provider      Subjective  No new c/o. " "    Objective:  General Appearance:  Comfortable, well-appearing, in no acute distress and not in pain.    Vital signs: (most recent): Blood pressure 134/89, pulse 88, temperature 97.9 °F (36.6 °C), temperature source Oral, resp. rate 18, height 70\" (177.8 cm), weight 147 lb (66.7 kg), SpO2 98 %.    Lungs:  Normal respiratory rate and normal effort.  He is not in respiratory distress.  No stridor.  There are decreased breath sounds.  No wheezes, rales or rhonchi.    Heart: Normal rate.  Regular rhythm.    Abdomen: Abdomen is soft and non-distended.  Bowel sounds are normal.     Extremities: There is no dependent edema.    Neurological: Patient is alert and oriented to person, place and time.    Skin:  Warm and dry.                Vital signs in last 24 hours:  Temp:  [97.9 °F (36.6 °C)-98.2 °F (36.8 °C)] 97.9 °F (36.6 °C)  Heart Rate:  [68-90] 88  Resp:  [16-18] 18  BP: (126-135)/(73-89) 134/89    Intake/Output:    Intake/Output Summary (Last 24 hours) at 02/05/17 1242  Last data filed at 02/05/17 1109   Gross per 24 hour   Intake     50 ml   Output    250 ml   Net   -200 ml         Exam:    Physical Exam   Cardiovascular: Normal rate and regular rhythm.    Pulmonary/Chest: Effort normal. No stridor. No respiratory distress. He has decreased breath sounds. He has no wheezes. He has no rhonchi. He has no rales.   Abdominal: Soft. Bowel sounds are normal. He exhibits no distension.   Neurological: He is alert.   Skin: Skin is warm and dry.         Results from last 7 days  Lab Units 02/05/17  0515 02/04/17  0411 02/03/17  1651   WBC 10*3/mm3 10.76* 10.92* 11.78*   HEMOGLOBIN g/dL 9.6* 9.9* 11.1*   PLATELETS 10*3/mm3 424 467 558*     Results from last 7 days  Lab Units 02/05/17  0516 02/04/17  0411 02/03/17  1651   SODIUM mmol/L 136 136 132*   POTASSIUM mmol/L 3.2* 4.1 3.9   CHLORIDE mmol/L 97* 97* 89*   TOTAL CO2 mmol/L 22.8 26.4 32.5*   BUN mg/dL 6* 9 13   CREATININE mg/dL 0.50* 0.55* 0.61*   GLUCOSE mg/dL 84 125* " 135*   Estimated Creatinine Clearance: 83.4 mL/min (by C-G formula based on Cr of 0.5).  Results from last 7 days  Lab Units 02/05/17  0516 02/04/17  0411 02/03/17  1651   CALCIUM mg/dL 8.5* 8.4* 9.7   ALBUMIN g/dL  --   --  3.50     Results from last 7 days  Lab Units 02/03/17  1651   ALBUMIN g/dL 3.50   BILIRUBIN mg/dL 0.6   ALK PHOS U/L 84   AST (SGOT) U/L 16   ALT (SGPT) U/L 13       Assessment:  Pulm infiltrate - Pneumonia &/o mass:  Pulm consult.   Reported G tube bleeding/leakage:  Hyponatremia:  Resolved.   Hyperglycemia:  Dysphagia:  CAD:  COPD:  Anemia:  Head and neck CA:      Plan:  GI input appreciated.  Please see above.   Discussed with Pulm.   Robin Corcoran MD  2/5/2017  12:42 PM       Electronically signed by Robin Corcoran MD at 2/5/2017 12:49 PM                     David Campbell MD at 2/5/2017  1:33 PM  Version 1 of 1         [facility]  Inpatient Progress Note    Reason for Follow Up:  G tube malfunction    Subjective     Interval History: new issues.  No obvious leakage from around G tube site.  He has not been started back on TFs.    Current Facility-Administered Medications:   •  albuterol (PROVENTIL) nebulizer solution 0.083% 2.5 mg/3mL, 2.5 mg, Nebulization, Q4H PRN, Willian Burgess MD  •  aspirin chewable tablet 81 mg, 81 mg, Per G Tube, Daily, Willian Burgess MD, 81 mg at 02/03/17 2213  •  calcium carbonate 1250 (500 CA) MG/5ML suspension 1,250 mg, 1,250 mg, Per G Tube, BID With Meals, Willian Burgess MD  •  dextrose 5 % and sodium chloride 0.9 % infusion, 75 mL/hr, Intravenous, Continuous, Willian Burgess MD, Last Rate: 75 mL/hr at 02/05/17 0045, 75 mL/hr at 02/05/17 0045  •  HYDROcodone-acetaminophen (NORCO) 5-325 MG per tablet 1 tablet, 1 tablet, Oral, Q4H PRN, Willian Burgess MD  •  lisinopril (PRINIVIL,ZESTRIL) tablet 5 mg, 5 mg, Per G Tube, Q24H, Willian Burgess MD, 5 mg at 02/04/17 1931  •   mirtazapine (REMERON) tablet 7.5 mg, 7.5 mg, Per G Tube, Nightly, Willian Burgess MD  •  multivitamin (THERAGRAN) tablet 1 tablet, 1 tablet, Per G Tube, Daily, Willian Burgess MD, 1 tablet at 02/04/17 1931  •  Pharmacy to dose vancomycin, , Does not apply, Continuous PRN, Willian Burgess MD  •  piperacillin-tazobactam (ZOSYN) 3.375 g in dextrose 50 mL IVPB (premix), 3.375 g, Intravenous, Q8H, Willian Burgess MD, 3.375 g at 02/05/17 1109  •  polycarbophil (FIBERCON) tablet 625 mg, 625 mg, Per G Tube, BID, Willian Burgess MD  •  [DISCONTINUED] potassium chloride (MICRO-K) CR capsule 40 mEq, 40 mEq, Oral, PRN **OR** potassium chloride (KLOR-CON) packet 40 mEq, 40 mEq, Per G Tube, PRN **OR** potassium chloride 10 mEq in 100 mL IVPB, 10 mEq, Intravenous, Q1H PRN, Robin Corcoran MD  •  sodium chloride 0.9 % flush 1-10 mL, 1-10 mL, Intravenous, PRN, Willian Burgess MD  •  Insert peripheral IV, , , Once **AND** sodium chloride 0.9 % flush 10 mL, 10 mL, Intravenous, PRN, Anil Ho MD  •  vancomycin 1250 mg/250 mL 0.9% NS IVPB ([facility]), 1,250 mg, Intravenous, Q12H, Robin Corcoran MD, 1,250 mg at 02/05/17 0619  •  vitamin C (ASCORBIC ACID) tablet 500 mg, 500 mg, Per G Tube, Daily, Willian Burgess MD, 500 mg at 02/04/17 1931  •  zolpidem (AMBIEN) tablet 2.5 mg, 2.5 mg, Oral, Nightly PRN, Robin Corcoran MD     Review of Systems:   + cough    Objective     Vital Signs  Temp:  [97.9 °F (36.6 °C)-98.2 °F (36.8 °C)] 97.9 °F (36.6 °C)  Heart Rate:  [68-90] 88  Resp:  [16-18] 18  BP: (126-135)/(73-89) 134/89  Body mass index is 21.09 kg/(m^2).    Intake/Output Summary (Last 24 hours) at 02/05/17 1333  Last data filed at 02/05/17 1109   Gross per 24 hour   Intake     50 ml   Output    250 ml   Net   -200 ml     I/O this shift:  In: 50 [IV Piggyback:50]  Out: -        Physical Exam:   General: patient awake, alert and cooperative   Eyes: Normal lids and lashes, no  scleral icterus   Neck: supple, normal ROM   Skin: warm and dry, not jaundiced   Cardiovascular: regular rhythm and rate, no murmurs auscultated   Pulm: clear to auscultation bilaterally, regular and unlabored   Abdomen: Normal bowel sounds, no masses, no organomegaly, soft non-tender, non-distended, no guarding, no rebound Tenderness. PEG tube is in realitvely good shape. There is tape at area of tube connector. There is some stomal granulation tissue, and a small area of irritated stomal tissue at base. No blood or discharge noted.  Rectal: deferred   Extremities: no rash or edema   Psychiatric: Normal mood and behavior; memory intact     Results Review:     I reviewed the patient's new clinical results.  I reviewed the patient's new imaging results and agree with the interpretation.      Results from last 7 days  Lab Units 02/05/17  0515 02/04/17 0411 02/03/17  1651   WBC 10*3/mm3 10.76* 10.92* 11.78*   HEMOGLOBIN g/dL 9.6* 9.9* 11.1*   HEMATOCRIT % 30.0* 31.4* 35.1*   PLATELETS 10*3/mm3 424 467 558*         Results from last 7 days  Lab Units 02/05/17  0516 02/04/17  0411 02/03/17  1651   SODIUM mmol/L 136 136 132*   POTASSIUM mmol/L 3.2* 4.1 3.9   CHLORIDE mmol/L 97* 97* 89*   TOTAL CO2 mmol/L 22.8 26.4 32.5*   BUN mg/dL 6* 9 13   CREATININE mg/dL 0.50* 0.55* 0.61*   CALCIUM mg/dL 8.5* 8.4* 9.7   BILIRUBIN mg/dL  --   --  0.6   ALK PHOS U/L  --   --  84   ALT (SGPT) U/L  --   --  13   AST (SGOT) U/L  --   --  16   GLUCOSE mg/dL 84 125* 135*         Results from last 7 days  Lab Units 02/03/17  1651   INR  1.19*       No results found for: LIPASE    Radiology:    Imaging Results (last 24 hours)     Procedure Component Value Units Date/Time    CT Chest Without Contrast [[id number]] Collected:  02/04/17 1821     Updated:  02/04/17 1821    Narrative:       CT CHEST WITHOUT CONTRAST     HISTORY: 66-year-old male with an abnormal chest radiograph. The history  states the patient is asymptomatic. Previous history of  head and neck  cancer. Tracheostomy.     TECHNIQUE: 3 mm images were obtained through the chest without the  administration of IV contrast. Compared with chest radiograph performed  yesterday.     FINDINGS: There is a large dense masslike airspace consolidation  completely opacifying the lateral segment of the right middle lobe.  There is also a masslike airspace consolidation at the posterior aspect  of the right lower lobe. There is lateral pleural thickening between the  right middle lobe and the right lower lobe airspace consolidations and  there is a minimal right pleural effusion. There is a reticular nodular  opacity within the lingula and there is mild atelectatic change at the  left lung base. There is fullness of the right hilum, but the  noncontrasted technique limits characterization and visualization of a  discrete node. There is an enlarged subcarinal node measuring 2.6 x 2.0  cm. Small volume of mucus/secretions are noted within the dependent  aspect of the trachea. Percutaneous G-tube is noted in the visualized  upper abdomen.       Impression:       1. The large dense masslike airspace consolidations within the right  middle lobe and right lower lobe are of uncertain etiology. Extensive  pneumonia is possible and there may be some chronicity given the  adjacent pleural thickening. However, a malignancy cannot be excluded.  There is right hilar fullness and subcarinal lymphadenopathy as  described. The lymphadenopathy may be reactive, but may be metastatic as  well.  2. Reticular nodular opacity within the lingula is suspected to  represent an acute infectious infiltrate.  3. Small volume of mucus/secretions are noted within the dependent  aspect of the trachea.             Assessment&#47;Plan     Patient Active Problem List   Diagnosis Code   • Pneumonia of right lower lobe due to infectious organism J18.9     Active Problems:  Pneumonia of right lower lobe due to infectious organism  ? Pulmonary  mass  Dysphagia with history of G tube     Recommendations:  I suspect based on the patient's preliminary bedside swallow that he will likely continue to require nocturnal tube feedings. We will await the results of his VFSS. The G-tube itself appears to be in relatively good shape probably get a new connector to place on the distal end. There is some irritation of the stomal area which we will treat with some barrier cream.  We do not stock a vertical Kain but I may be able to order this tomorrow. I do not see a clear indication to replace this tube at this time, particularly given his pulmonary issues. Tube feeds can certainly be resumed for now while we await speech pathology evaluation.      David Campbell MD  02/05/17  1:33 PM                 Electronically signed by David Campbell MD at 2/5/2017  1:36 PM           Consult Notes (last 72 hours) (Notes from 2/3/2017 11:12 AM through 2/6/2017 11:12 AM)      David Campbell MD at 2/4/2017  4:26 PM  Version 1 of 1     Consult Orders:    1. Inpatient Consult to Gastroenterology [[id number]] ordered by Willian Burgess MD at 02/03/17 1927                [facility]  Initial Inpatient Consult Note    Referring Provider: Negrito Lane    Reason for Consultation: Possible G tube replacement/removal    Subjective     History of present illness:    This is a 66-year-old gentleman who is a relatively poor historian but apparently has a history of head and neck cancer status post prior chemotherapy and surgical intervention.  He has a PEG tube placed which he tells me was put in about a year ago.  He does not remember where but thinks it may been a [facility].  He presents today from the nursing home requesting that his PEG tube be removed.  His current nutritional regimen consisted puréed foods during the day followed by nocturnal tube feeding.  He tells me that he has been told that he no longer needs his PEG  tube.  Has been some report of possible bleeding around the PEG tube site per the nursing home the patient cannot quadrate this.  He does report some issues with leaking of the tube feeds around the connector and has been taking this area with some success.  He denies any pain associated with his tube feeding regimen.    Patient underwent a bedside swallow today and was felt that he would need a video swallow for more definitive evaluation of his swallow mechanism.      Past Medical History   Diagnosis Date   • SCAR (acute kidney injury)    • Brain cancer      caancer head, neck and face   • COPD (chronic obstructive pulmonary disease)    • Coronary artery disease    • Dysphagia    • Hiatal hernia    • Hyperlipidemia    • Hypertension    • Malignant neoplasm of head    • Squamous cell carcinoma      of neck       Past Surgical History:  Past Surgical History   Procedure Laterality Date   • Spine surgery     • Cardiac surgery          Social History:   Social History   Substance Use Topics   • Smoking status: Former Smoker   • Smokeless tobacco: Not on file      Comment: stopped about a year ago   • Alcohol use No        Family History:  History reviewed. No pertinent family history.    Home Meds:  Prescriptions Prior to Admission   Medication Sig Dispense Refill Last Dose   • albuterol (PROVENTIL HFA;VENTOLIN HFA) 108 (90 BASE) MCG/ACT inhaler Inhale 2 puffs Every 4 (Four) Hours As Needed for wheezing.      • albuterol (PROVENTIL) (2.5 MG/3ML) 0.083% nebulizer solution Take 2.5 mg by nebulization 3 (Three) Times a Day.      • albuterol (PROVENTIL) (2.5 MG/3ML) 0.083% nebulizer solution Take 2.5 mg by nebulization Every 4 (Four) Hours As Needed for wheezing (with spacer).      • Ascorbic Acid (VITAMIN C) 500 MG capsule 1 tablet by Per G Tube route Daily.      • aspirin 81 MG chewable tablet 81 mg by Per G Tube route Daily.      • clobetasol (TEMOVATE) 0.05 % ointment Apply  topically Daily. Apply to L upper thigh  daily til healed      • Emollient (AQUAPHOR ADVANCED THERAPY) ointment Apply 1 application topically. Every shift to front of neck and throat      • guaiFENesin (ROBITUSSIN) 100 MG/5ML solution oral solution 200 mg by Per G Tube route Every 6 (Six) Hours As Needed.      • HYDROcodone-acetaminophen (NORCO) 5-325 MG per tablet Take 1 tablet by mouth 4 (Four) Times a Day As Needed.      • ibuprofen (ADVIL,MOTRIN) 600 MG tablet 600 mg by Per G Tube route 3 (Three) Times a Day.      • lisinopril (PRINIVIL,ZESTRIL) 5 MG tablet 5 mg by Per G Tube route.      • mirtazapine (REMERON) 7.5 MG half tablet 7.5 mg by Per G Tube route Every Night.      • Multiple Vitamin (TAB-A-JONN PO) 1 tablet by Per G Tube route Daily.      • mupirocin (BACTROBAN) 2 % ointment Apply  topically 2 (Two) Times a Day. Cleanse peg with normal saline and peroxide mixture apply oint topically & cover as directed two times daily      • Nutritional Supplements (JEVITY 1.2 ANDERSON) liquid by Per G Tube route. 85 cc per hr from 7p-7a      • Nutritional Supplements (PROMOD) liquid 30 mL by Per G Tube route 3 (Three) Times a Day.      • oyster shell calcium 500 MG tablet tablet 500 mg by Per G Tube route 2 (Two) Times a Day.      • polycarbophil (FIBER LAXATIVE) 625 MG tablet 625 mg by Per G Tube route 2 (Two) Times a Day.      • potassium chloride (KAYCIEL) 20 MEQ/15ML (10%) solution 40 mEq by Per G Tube route Daily.      • Sodium Fluoride (PREVIDENT 5000 BOOSTER) 1.1 % paste Apply  to teeth every night at bedtime.      • Umeclidinium Bromide (INCRUSE ELLIPTA) 62.5 MCG/INH aerosol powder  Inhale 1 puff Daily.      • Unable to find 1 each 1 (One) Time. H2O flush per GT with 200 cc water every 4 hours      • Unable to find Apply 1 each topically 1 (One) Time. House barrier cream to upper chest and neck area avoiding stoma      • zolpidem (AMBIEN) 5 MG tablet Take 2.5 mg by mouth Daily.      • albuterol (PROVENTIL HFA;VENTOLIN HFA) 108 (90 BASE) MCG/ACT inhaler  Inhale 2 puffs Every 6 (Six) Hours.   Unknown at Unknown time   • aspirin 325 MG tablet Take 81 mg by mouth.   Unknown at Unknown time   • promethazine (PHENERGAN) 25 MG tablet Take 25 mg by mouth.   Unknown at Unknown time       Current Meds:     aspirin 81 mg Per G Tube Daily   calcium carbonate 1,250 mg Per G Tube BID With Meals   lisinopril 5 mg Per G Tube Q24H   mirtazapine 7.5 mg Per G Tube Nightly   multivitamin 1 tablet Per G Tube Daily   piperacillin-tazobactam 3.375 g Intravenous Q8H   polycarbophil 625 mg Per G Tube BID   vancomycin 1,250 mg Intravenous Q12H   vitamin C 500 mg Per G Tube Daily   zolpidem 2.5 mg Oral Daily       Allergies:  No Known Allergies    Review of Systems  All systems were reviewed and negative except for:  Constitution:  positive for fatigue  ENT:  positive for sore throat and voice change  Gastrointestinal: postitive for  difficulty / pain with swallowing     Objective     Vital Signs  Temp:  [98.3 °F (36.8 °C)-98.5 °F (36.9 °C)] 98.5 °F (36.9 °C)  Heart Rate:  [63-83] 73  Resp:  [16-18] 16  BP: (111-136)/(57-82) 119/57    Physical Exam:  General Appearance:    Alert, cooperative, in no acute distress, raspy voice   Head:    Normocephalic, without obvious abnormality, atraumatic   Eyes:            Lids and lashes normal, conjunctivae and sclerae normal, no   icterus   Throat:   No oral lesions, no thrush, oral mucosa moist   Neck:   No adenopathy, supple, trachea midline, no thyromegaly, no   carotid bruit, no JVD   Lungs:     Clear to auscultation,respirations regular, even and                   unlabored    Heart:    Regular rhythm and normal rate, normal S1 and S2, no            murmur, no gallop, no rub, no click   Chest Wall:    No abnormalities observed   Abdomen:     Normal bowel sounds, no masses, no organomegaly, soft        non-tender, non-distended, no guarding, no rebound                 Tenderness. PEG tube is in realitvely good shape.  There is tape at area of tube  connector.  There is some stomal granulation tissue, and a small area of irritated stomal tissue at base.  No blood or discharge noted.     Rectal:     Deferred   Extremities:   no edema, no cyanosis, no redness   Skin:   No bleeding, bruising or rash   Lymph nodes:   No palpable adenopathy   Psychiatric:  Judgement and insight: normal   Orientation to person place and time: normal   Mood and affect: normal     Results Review:   I reviewed the patient's new clinical results.      Results from last 7 days  Lab Units 02/04/17  0411 02/03/17  1651   WBC 10*3/mm3 10.92* 11.78*   HEMOGLOBIN g/dL 9.9* 11.1*   HEMATOCRIT % 31.4* 35.1*   PLATELETS 10*3/mm3 467 558*         Results from last 7 days  Lab Units 02/04/17  0411 02/03/17  1651   SODIUM mmol/L 136 132*   POTASSIUM mmol/L 4.1 3.9   CHLORIDE mmol/L 97* 89*   TOTAL CO2 mmol/L 26.4 32.5*   BUN mg/dL 9 13   CREATININE mg/dL 0.55* 0.61*   CALCIUM mg/dL 8.4* 9.7   BILIRUBIN mg/dL  --  0.6   ALK PHOS U/L  --  84   ALT (SGPT) U/L  --  13   AST (SGOT) U/L  --  16   GLUCOSE mg/dL 125* 135*         Results from last 7 days  Lab Units 02/03/17  1651   INR  1.19*       No results found for: LIPASE    Radiology:  Imaging Results (last 72 hours)     Procedure Component Value Units Date/Time    XR Chest 1 View [[id number]] Collected:  02/03/17 1627     Updated:  02/03/17 1630    Narrative:       EMERGENCY CHEST SINGLE VIEW     HISTORY: 66-year-old male with cough     COMPARISON: 12/08/2014     FINDINGS:  1. Acute right base pneumonia.  2. No other change.  3. Recommend follow-up to confirm resolution     This report was finalized on 2/3/2017 4:27 PM by Dr. Willy Aden MD.             Assessment&#47;Plan     Active Problems:    Pneumonia of right lower lobe due to infectious organism    Dysphagia with history of G tube    Recommendations:suspect based on the patient's preliminary bedside swallow that he will likely continue to require nocturnal tube feedings.  We will await the  results of his VFSS.  The G-tube itself appears to be in relatively good shape probably get a new connector to place on the distal end.  There is some irritation of the stomal area which we will treat with some barrier cream and I will see if we carry a vertical Kain which may help with this.  I do not see a clear indication to replace this tube at this time.  Tube feeds can certainly be resumed for now while we await speech pathology evaluation.      I discussed the patients findings and my recommendations with patient    David Campbell MD  [facility]  02/04/17               Electronically signed by David Campbell MD at 2/4/2017  4:37 PM      Kyrie Linn MD at 2/5/2017  3:33 PM  Version 1 of 1                       Patient Care Team:  No Known Provider as PCP - General  No Known Provider as PCP - Family Medicine    Chief complaint asked to evaluate for dense infiltrate/mass in the right lung    Subjective     Patient is a 66 y.o. male nursing home resident whom I have spent time with trying to get a history and absolute been unable to I called his sister Re who is his POA and found that he has dementia which was pretty obvious talking with him.  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  He's been residing at the nursing home he's had dysphagia and he has a feeding tube although apparently he has been upgraded to a diet.  They felt in December that he had a pneumonia he was treated at the nursing facility for that and apparently got better then she got the call that he was having more problems in the sent over here.  Does have coronary artery disease and had prior coronary bypass grafting he has hypertension hyperlipidemia.  He smoked for many years     Review of Systems      History  Past Medical History   Diagnosis Date   • SCAR (acute  "kidney injury)    • Brain cancer      caancer head, neck and face   • COPD (chronic obstructive pulmonary disease)    • Coronary artery disease    • Dysphagia    • Hiatal hernia    • Hyperlipidemia    • Hypertension    • Malignant neoplasm of head    • Squamous cell carcinoma      of neck     Past Surgical History   Procedure Laterality Date   • Spine surgery     • Cardiac surgery         Social History   • Marital status:      Spouse name: N/A   • Number of children: N/A   • Years of education: N/A     Social History Main Topics   • Smoking status: Former Smoker   • Smokeless tobacco: None      Comment: stopped about a year ago   • Alcohol use No   • Drug use: No   • Sexual activity: Defer     Allergies:  Review of patient's allergies indicates no known allergies.    Objective     Vital Signs  Temp:  [97.9 °F (36.6 °C)-98.2 °F (36.8 °C)] 98.2 °F (36.8 °C)  Heart Rate:  [68-90] 84  Resp:  [16-18] 18  BP: (106-135)/(73-89) 106/80  Body mass index is 21.09 kg/(m^2).    Intake/Output Summary (Last 24 hours) at 02/05/17 1533  Last data filed at 02/05/17 1400   Gross per 24 hour   Intake    250 ml   Output    250 ml   Net      0 ml     I/O this shift:  In: 250 [IV Piggyback:250]  Out: -     Flowsheet Rows         First Filed Value    Admission Height  70\" (177.8 cm) Documented at 02/03/2017 1523    Admission Weight  160 lb (72.6 kg) Documented at 02/03/2017 1523           Physical Exam:  General Appearance: Well-developed white male resting in bed does not appear in any acute distress  Eyes: Conjunctiva are clear and anicteric pupils are unequal the right is about  2.5 mm and the left is about 3.5 mm both react to light  ENT: Mucous membranes are little dry voice is hoarse  Neck: He appears midline he has an old scar consistent with a prior tracheostomy  Lungs: His lungs are pretty clear he does have a little decreased breath sounds at the right base versus the left but I don't percuss any dullness chest " expansion is symmetric and nonlabored  Cardiac: Regular rate and rhythm no murmur  Abdomen: Soft nontender no palpable organomegaly or masses he does have a left upper quadrant 2 type tube  : Not examined  Musc/Skel: Grossly normal  Skin: No jaundice no rashes no petechiae noted  Neuro: Definitely moves all 4 extremities but he is very confused  Extremities/P Vascular: No clubbing cyanosis or edema palpable radial dorsalis pedis pulses  MSE: Hard To assess      Labs:  WBC No results found for: WBCS   HGB HEMOGLOBIN   Date Value Ref Range Status   02/05/2017 9.6 (L) 13.7 - 17.6 g/dL Final   02/04/2017 9.9 (L) 13.7 - 17.6 g/dL Final   02/03/2017 11.1 (L) 13.7 - 17.6 g/dL Final      HCT HEMATOCRIT   Date Value Ref Range Status   02/05/2017 30.0 (L) 40.4 - 52.2 % Final   02/04/2017 31.4 (L) 40.4 - 52.2 % Final   02/03/2017 35.1 (L) 40.4 - 52.2 % Final      Platlets No results found for: LABPLAT     PT/INR:      PROTIME   Date Value Ref Range Status   02/03/2017 14.7 (H) 11.7 - 14.2 Seconds Final   /  INR   Date Value Ref Range Status   02/03/2017 1.19 (H) 0.90 - 1.10 Final       Sodium SODIUM   Date Value Ref Range Status   02/05/2017 136 136 - 145 mmol/L Final   02/04/2017 136 136 - 145 mmol/L Final   02/03/2017 132 (L) 136 - 145 mmol/L Final      Potassium POTASSIUM   Date Value Ref Range Status   02/05/2017 3.2 (L) 3.5 - 5.2 mmol/L Final   02/04/2017 4.1 3.5 - 5.2 mmol/L Final   02/03/2017 3.9 3.5 - 5.2 mmol/L Final      Chloride CHLORIDE   Date Value Ref Range Status   02/05/2017 97 (L) 98 - 107 mmol/L Final   02/04/2017 97 (L) 98 - 107 mmol/L Final   02/03/2017 89 (L) 98 - 107 mmol/L Final      Bicarbonate No results found for: PLASMABICARB   BUN BUN   Date Value Ref Range Status   02/05/2017 6 (L) 8 - 23 mg/dL Final   02/04/2017 9 8 - 23 mg/dL Final   02/03/2017 13 8 - 23 mg/dL Final      Creatinine CREATININE   Date Value Ref Range Status   02/05/2017 0.50 (L) 0.76 - 1.27 mg/dL Final   02/04/2017 0.55 (L) 0.76  - 1.27 mg/dL Final   02/03/2017 0.61 (L) 0.76 - 1.27 mg/dL Final      Calcium CALCIUM   Date Value Ref Range Status   02/05/2017 8.5 (L) 8.6 - 10.5 mg/dL Final   02/04/2017 8.4 (L) 8.6 - 10.5 mg/dL Final   02/03/2017 9.7 8.6 - 10.5 mg/dL Final      Magnesium No results found for: MG       Radiographic Imaging:  Imaging Results (last 24 hours)     Procedure Component Value Units Date/Time    CT Chest Without Contrast [[id number]] Collected:  02/04/17 1821     Updated:  02/04/17 1821    Narrative:       CT CHEST WITHOUT CONTRAST     HISTORY: 66-year-old male with an abnormal chest radiograph. The history  states the patient is asymptomatic. Previous history of head and neck  cancer. Tracheostomy.     TECHNIQUE: 3 mm images were obtained through the chest without the  administration of IV contrast. Compared with chest radiograph performed  yesterday.     FINDINGS: There is a large dense masslike airspace consolidation  completely opacifying the lateral segment of the right middle lobe.  There is also a masslike airspace consolidation at the posterior aspect  of the right lower lobe. There is lateral pleural thickening between the  right middle lobe and the right lower lobe airspace consolidations and  there is a minimal right pleural effusion. There is a reticular nodular  opacity within the lingula and there is mild atelectatic change at the  left lung base. There is fullness of the right hilum, but the  noncontrasted technique limits characterization and visualization of a  discrete node. There is an enlarged subcarinal node measuring 2.6 x 2.0  cm. Small volume of mucus/secretions are noted within the dependent  aspect of the trachea. Percutaneous G-tube is noted in the visualized  upper abdomen.       Impression:       1. The large dense masslike airspace consolidations within the right  middle lobe and right lower lobe are of uncertain etiology. Extensive  pneumonia is possible and there may be some chronicity given  the  adjacent pleural thickening. However, a malignancy cannot be excluded.  There is right hilar fullness and subcarinal lymphadenopathy as  described. The lymphadenopathy may be reactive, but may be metastatic as  well.  2. Reticular nodular opacity within the lingula is suspected to  represent an acute infectious infiltrate.  3. Small volume of mucus/secretions are noted within the dependent  aspect of the trachea.           reviewed the CT scan of the chest and I agree with the radiology interpretation above    Assessment&#47;Plan     Patient Active Problem List   Diagnosis Code   • Pneumonia of right lower lobe due to infectious organism J18.9       #1 dense right masslike airspace consolidations with adjacent pleural thickening and right hilar and subcarinal lymphadenopathy impression her only very minimal white count elevation on admission no fever normal pro-calcitonin.  These things to make one suspicious of the diagnosis of infectious pneumonia.  He does have a history of dysphagia and certainly aspiration has to be considered a peripheralof these are somewhat against this.  Patient to infection noninfectious pneumonia/pneumonitis as such as cryptogenic organizing pneumonia have to be considered and obviously malignancy particularly with the what I'm told is a heavy smoking history.  It also have a metastatic process given his prior squamous carcinoma the head and neck.  The patient probably does need bronchoscopic evaluation biopsy E BUS to biopsy these lymph nodes to rule out malignancy for interstitial disease one would have to consider more aggressive surgical biopsy or empiric therapy.  In a patient with dysphagia, putting her on immunosuppressants whether it's significant dose steroids and/or steroid sparing immunosuppressants for tenderness or significant risks.  If this were to be malignancy with the extent that's present in his functional class I don't think he would be a candidate even for  chemotherapy.  I think over the big issues is how much to report this gentleman through trying to find out what this is versus just treating for infection and monitoring to see how he responds.  I think a lot of this depends on whether the family since he is incapable of making a decision, would want to treat him if he had one of these problems.  I certainly wouldn't want to putting him through the risk of biopsies etc. if we were not going to treat.  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     Electronically signed by Kyrie Linn MD at 2/5/2017  3:46 PM

## 2017-02-06 NOTE — PLAN OF CARE
Problem: Patient Care Overview (Adult)  Goal: Plan of Care Review  Outcome: Ongoing (interventions implemented as appropriate)    02/06/17 0548   Coping/Psychosocial Response Interventions   Plan Of Care Reviewed With patient   Patient Care Overview   Progress improving       Goal: Adult Individualization and Mutuality  Outcome: Ongoing (interventions implemented as appropriate)  Goal: Discharge Needs Assessment  Outcome: Ongoing (interventions implemented as appropriate)    Problem: Fall Risk (Adult)  Goal: Identify Related Risk Factors and Signs and Symptoms  Outcome: Outcome(s) achieved Date Met:  02/06/17  Goal: Absence of Falls  Outcome: Ongoing (interventions implemented as appropriate)    Problem: Pneumonia (Adult)  Goal: Signs and Symptoms of Listed Potential Problems Will be Absent or Manageable (Pneumonia)  Outcome: Ongoing (interventions implemented as appropriate)

## 2017-02-06 NOTE — PLAN OF CARE
Problem: Patient Care Overview (Adult)  Goal: Plan of Care Review    02/06/17 0823   Coping/Psychosocial Response Interventions   Plan Of Care Reviewed With patient   Patient Care Overview   Progress no change   Outcome Evaluation   Outcome Summary/Follow up Plan VFSS: pt is not safe for PO; wishes to eat for QOL. Puree & thin; but at great risk to aspriate. SLP to s/o, pt is at baseline.          Problem: Inpatient SLP  Goal: Dysphagia- Patient will improve swallowing skills to begin to take some PO safely  Outcome: Unable to achieve outcome(s) by discharge Date Met:  02/06/17 02/06/17 0823   Begin to Take Some PO Safely   Begin to Take Some PO Safely- SLP, Additional Goal NPO is safest option, but pt wishes to eat for QOL.    Begin to Take Some PO Safely- SLP, Date Goal Reviewed 02/06/17   Begin to Take Some PO Safely- SLP, Outcome other (see comments)  (no change from baseline PTA. )

## 2017-02-07 LAB
ANION GAP SERPL CALCULATED.3IONS-SCNC: 10.2 MMOL/L
BUN BLD-MCNC: 7 MG/DL (ref 8–23)
BUN/CREAT SERPL: 11.5 (ref 7–25)
CALCIUM SPEC-SCNC: 8 MG/DL (ref 8.6–10.5)
CHLORIDE SERPL-SCNC: 100 MMOL/L (ref 98–107)
CO2 SERPL-SCNC: 27.8 MMOL/L (ref 22–29)
CREAT BLD-MCNC: 0.61 MG/DL (ref 0.76–1.27)
DEPRECATED RDW RBC AUTO: 46.6 FL (ref 37–54)
ERYTHROCYTE [DISTWIDTH] IN BLOOD BY AUTOMATED COUNT: 14.7 % (ref 11.5–14.5)
GFR SERPL CREATININE-BSD FRML MDRD: 131 ML/MIN/1.73
GLUCOSE BLD-MCNC: 138 MG/DL (ref 65–99)
HCT VFR BLD AUTO: 28.7 % (ref 40.4–52.2)
HGB BLD-MCNC: 9 G/DL (ref 13.7–17.6)
MCH RBC QN AUTO: 27.4 PG (ref 27–32.7)
MCHC RBC AUTO-ENTMCNC: 31.4 G/DL (ref 32.6–36.4)
MCV RBC AUTO: 87.5 FL (ref 79.8–96.2)
PLATELET # BLD AUTO: 365 10*3/MM3 (ref 140–500)
PMV BLD AUTO: 9.2 FL (ref 6–12)
POTASSIUM BLD-SCNC: 3.4 MMOL/L (ref 3.5–5.2)
POTASSIUM BLD-SCNC: 4.3 MMOL/L (ref 3.5–5.2)
RBC # BLD AUTO: 3.28 10*6/MM3 (ref 4.6–6)
SODIUM BLD-SCNC: 138 MMOL/L (ref 136–145)
WBC NRBC COR # BLD: 8.96 10*3/MM3 (ref 4.5–10.7)

## 2017-02-07 PROCEDURE — 97110 THERAPEUTIC EXERCISES: CPT

## 2017-02-07 PROCEDURE — 25010000002 PIPERACILLIN SOD-TAZOBACTAM PER 1 G: Performed by: INTERNAL MEDICINE

## 2017-02-07 PROCEDURE — 25810000003 DEXTROSE-NACL PER 500 ML: Performed by: INTERNAL MEDICINE

## 2017-02-07 PROCEDURE — 84132 ASSAY OF SERUM POTASSIUM: CPT | Performed by: INTERNAL MEDICINE

## 2017-02-07 PROCEDURE — 99231 SBSQ HOSP IP/OBS SF/LOW 25: CPT | Performed by: INTERNAL MEDICINE

## 2017-02-07 PROCEDURE — 25010000002 VANCOMYCIN: Performed by: HOSPITALIST

## 2017-02-07 PROCEDURE — 87070 CULTURE OTHR SPECIMN AEROBIC: CPT | Performed by: INTERNAL MEDICINE

## 2017-02-07 PROCEDURE — 87205 SMEAR GRAM STAIN: CPT | Performed by: INTERNAL MEDICINE

## 2017-02-07 PROCEDURE — 85027 COMPLETE CBC AUTOMATED: CPT | Performed by: INTERNAL MEDICINE

## 2017-02-07 PROCEDURE — 80048 BASIC METABOLIC PNL TOTAL CA: CPT | Performed by: INTERNAL MEDICINE

## 2017-02-07 RX ADMIN — POTASSIUM CHLORIDE 40 MEQ: 1.5 POWDER, FOR SOLUTION ORAL at 06:31

## 2017-02-07 RX ADMIN — CALCIUM CARBONATE 1250 MG: 1250 SUSPENSION ORAL at 17:34

## 2017-02-07 RX ADMIN — CALCIUM POLYCARBOPHIL 625 MG: 625 TABLET ORAL at 17:34

## 2017-02-07 RX ADMIN — MIRTAZAPINE 7.5 MG: 15 TABLET, FILM COATED ORAL at 20:39

## 2017-02-07 RX ADMIN — Medication 1 APPLICATION: at 09:48

## 2017-02-07 RX ADMIN — ASPIRIN 81 MG: 81 TABLET, CHEWABLE ORAL at 09:47

## 2017-02-07 RX ADMIN — VANCOMYCIN HYDROCHLORIDE 1250 MG: 1 INJECTION, POWDER, LYOPHILIZED, FOR SOLUTION INTRAVENOUS at 06:30

## 2017-02-07 RX ADMIN — TAZOBACTAM SODIUM AND PIPERACILLIN SODIUM 3.38 G: 375; 3 INJECTION, SOLUTION INTRAVENOUS at 09:47

## 2017-02-07 RX ADMIN — POTASSIUM CHLORIDE 40 MEQ: 1.5 POWDER, FOR SOLUTION ORAL at 14:41

## 2017-02-07 RX ADMIN — VANCOMYCIN HYDROCHLORIDE 1250 MG: 1 INJECTION, POWDER, LYOPHILIZED, FOR SOLUTION INTRAVENOUS at 17:34

## 2017-02-07 RX ADMIN — Medication 1 APPLICATION: at 20:39

## 2017-02-07 RX ADMIN — CALCIUM POLYCARBOPHIL 625 MG: 625 TABLET ORAL at 09:46

## 2017-02-07 RX ADMIN — CALCIUM CARBONATE 1250 MG: 1250 SUSPENSION ORAL at 09:46

## 2017-02-07 RX ADMIN — TAZOBACTAM SODIUM AND PIPERACILLIN SODIUM 3.38 G: 375; 3 INJECTION, SOLUTION INTRAVENOUS at 02:20

## 2017-02-07 RX ADMIN — LISINOPRIL 5 MG: 5 TABLET ORAL at 09:47

## 2017-02-07 RX ADMIN — TAZOBACTAM SODIUM AND PIPERACILLIN SODIUM 3.38 G: 375; 3 INJECTION, SOLUTION INTRAVENOUS at 17:35

## 2017-02-07 RX ADMIN — OXYCODONE HYDROCHLORIDE AND ACETAMINOPHEN 500 MG: 500 TABLET ORAL at 09:47

## 2017-02-07 RX ADMIN — Medication 1 TABLET: at 09:47

## 2017-02-07 RX ADMIN — DEXTROSE AND SODIUM CHLORIDE 75 ML/HR: 5; 900 INJECTION, SOLUTION INTRAVENOUS at 14:41

## 2017-02-07 NOTE — PROGRESS NOTES
Acute Care - Physical Therapy Treatment Note  [facility]     Patient Name: Neville Mendez  : 1950  MRN: [id number]  Today's Date: 2017  Onset of Illness/Injury or Date of Surgery Date: 17  Date of Referral to PT: 17  Referring Physician: Nilo    Admit Date: 2/3/2017    Visit Dx:    ICD-10-CM ICD-9-CM   1. Pneumonia of right lower lobe due to infectious organism J18.9 483.8   2. Bleeding from g-tube R58 459.0   3. Difficulty walking R26.2 719.7     Patient Active Problem List   Diagnosis   • Pneumonia of right lower lobe due to infectious organism   • Dementia without behavioral disturbance   • Dysphagia               Adult Rehabilitation Note       17 1048 17 0900 17 1000    Rehab Assessment/Intervention    Discipline physical therapist  -EW physical therapy assistant  -CW physical therapy assistant  -RH    Document Type therapy note (daily note)  -EW therapy note (daily note)  -CW therapy note (daily note)  -RH    Subjective Information agree to therapy  -EW agree to therapy;no complaints  -CW no complaints  -RH    Patient Effort, Rehab Treatment good  -EW good  -CW     Symptoms Noted During/After Treatment   none  -RH    Precautions/Limitations fall precautions  -EW fall precautions  -CW     Precautions/Limitations, Vision   WFL  -RH    Precautions/Limitations, Hearing   WFL  -RH    Recorded by [EW] Mirta Patino, PT [CW] Magdiel lAcaraz [RH] Jimy West, LINWOOD    Vital Signs    Post SpO2 (%)   98  -RH    O2 Delivery Post Treatment   room air  -RH    Recorded by   [RH] Jimy West, LINWOOD    Pain Assessment    Pain Assessment No/denies pain  -EW No/denies pain  -CW No/denies pain  -RH    Recorded by [EW] Mirta Patino, PT [CW] Magdiel Alcaraz [RH] Jimy West PTA    Cognitive Assessment/Intervention    Current Cognitive/Communication Assessment  functional  -CW functional  -RH    Orientation Status  oriented x 4  -CW oriented x 4  -RH     Follows Commands/Answers Questions  100% of the time;able to follow single-step instructions;needs cueing  -% of the time;needs cueing  -RH    Personal Safety  WNL/WFL  -CW WNL/WFL  -RH    Personal Safety Interventions  fall prevention program maintained;gait belt;nonskid shoes/slippers when out of bed  -CW nonskid shoes/slippers when out of bed  -RH    Recorded by  [CW] Magdiel Alcaraz [RH] Jimy West, PTA    ROM (Range of Motion)    General ROM   no range of motion deficits identified  -RH    Recorded by   [RH] Jimy West, PTA    Bed Mobility, Assessment/Treatment    Bed Mob, Supine to Sit, [place] conditional independence  -EW conditional independence  -CW conditional independence  -RH    Bed Mob, Sit to Supine, [place] conditional independence  -EW conditional independence  -CW conditional independence  -RH    Recorded by [EW] Mirta Patino, PT [CW] Magdiel Alcaraz [RH] Jimy West, PTA    Transfer Assessment/Treatment    Transfers, Sit-Stand [place] conditional independence  -EW conditional independence  -CW conditional independence  -RH    Transfers, Stand-Sit [place] conditional independence  -EW conditional independence  -CW conditional independence  -RH    Transfers, Sit-Stand-Sit, Assist Device rolling walker  -EW rolling walker  -CW rolling walker  -RH    Recorded by [EW] Mirta Patino, PT [CW] Magdiel Alcaraz [RH] Jimy West, PTA    Gait Assessment/Treatment    Gait, [place] Level verbal cues required;stand by assist;contact guard assist  -EW stand by assist  -CW stand by assist  -RH    Gait, Assistive Device rolling walker  -EW rolling walker  -CW rolling walker  -RH    Gait, Distance (Feet) 200  -  -  -RH    Gait, Gait Deviations forward flexed posture;lissa decreased  -EW  lissa decreased;forward flexed posture  -RH    Gait, Impairments   strength decreased;impaired balance  -RH    Recorded by [EW] Mirta SWANSON  Sukumar, PT [CW] Magdiel Alcaraz [RH] Jimy West PTA    Functional Mobility    Functional Mobility- Ind. Level   supervision required;contact guard assist  -RH    Recorded by   [RH] Jimy West PTA    Balance Skills Training    Sitting-Level of Assistance   Independent  -RH    Sitting-Balance Support   Feet supported  -RH    Standing-Level of Assistance   Close supervision  -RH    Static Standing Balance Support   assistive device  -RH    Recorded by   [RH] Jimy West PTA    Positioning and Restraints    Pre-Treatment Position in bed  -EW in bed  -CW in bed  -RH    Post Treatment Position bed  -EW bed  -CW bed  -RH    In Bed supine;notified nsg;call light within reach;encouraged to call for assist  -EW sitting EOB;call light within reach;encouraged to call for assist  -CW sitting EOB;call light within reach;exit alarm on  -RH    Recorded by [EW] Mirta Patino, PT [CW] Magdiel Alcaraz [RH] Jimy West PTA      User Key  (r) = Recorded By, (t) = Taken By, (c) = Cosigned By    Initials Name Effective Dates    EW Mirta Patino, PT 12/01/15 -     RH Jimy West, LINWOOD 02/18/16 -     CW Magdiel Alcaraz 12/13/16 -                 IP PT Goals       02/04/17 1521          Bed Mobility PT STG    Bed Mobility PT STG, Date Established 02/04/17  -SV      Bed Mobility PT STG, Time to Achieve 1 wk  -SV      Bed Mobility PT STG, Activity Type all bed mobility  -SV      Bed Mobility PT STG, [place] Level independent  -SV      Transfer Training PT STG    Transfer Training PT STG, Date Established 02/04/17  -SV      Transfer Training PT STG, Time to Achieve 1 wk  -SV      Transfer Training PT STG, Activity Type all transfers  -SV      Transfer Training PT STG, [place] Level independent   least vs no AD  -SV      Gait Training PT STG    Gait Training Goal PT STG, Date Established 02/04/17  -SV      Gait Training Goal PT STG, Time to Achieve 1 wk  -SV      Gait Training Goal PT  STG, [place] Level independent  -SV      Gait Training Goal PT STG, Assist Device --   least vs no AD  -SV      Gait Training Goal PT STG, Distance to Achieve 300  -SV        User Key  (r) = Recorded By, (t) = Taken By, (c) = Cosigned By    Initials Name Provider Type    SV Zaria Marvin, PT Physical Therapist          Physical Therapy Education     Title: PT OT SLP Therapies (Done)     Topic: Physical Therapy (Done)     Point: Mobility training (Done)    Learning Progress Summary    Learner Readiness Method Response Comment Documented by Status   Patient Acceptance E,D VU,NR  EW 02/07/17 1059 Done    Acceptance E,TB DU,VU  CW 02/06/17 0946 Done    Acceptance E,D NR,VU  SV 02/04/17 1520 Done               Point: Home exercise program (Done)    Learning Progress Summary    Learner Readiness Method Response Comment Documented by Status   Patient Acceptance E,D VU,NR  EW 02/07/17 1059 Done    Acceptance E,TB DU,VU  CW 02/06/17 0946 Done    Acceptance E,D NR,VU  SV 02/04/17 1520 Done                      User Key     Initials Effective Dates Name Provider Type Discipline    EW 12/01/15 -  Mirta Patino, PT Physical Therapist PT     01/17/16 -  Zaria Marvin, PT Physical Therapist PT     12/13/16 -  Magdiel Alcaraz Physical Therapy Assistant PT                    PT Recommendation and Plan  Anticipated Discharge Disposition: skilled nursing facility  Plan of Care Review  Plan Of Care Reviewed With: patient  Progress: improving  Outcome Summary/Follow up Plan: Pt doing well with ambulation, increased independence with bed mobility and transfers and no c/o this session.  Will progress as tolerated.          Outcome Measures       02/07/17 1100 02/06/17 0900 02/05/17 1000    How much help from another person do you currently need...    Turning from your back to your side while in flat bed without using bedrails? 4  -EW 4  -CW 4  -RH    Moving from lying on back to sitting on the side of a flat bed without  bedrails? 4  -EW 4  -CW 4  -RH    Moving to and from a bed to a chair (including a wheelchair)? 3  -EW 3  -CW 3  -RH    Standing up from a chair using your arms (e.g., wheelchair, bedside chair)? 3  -EW 3  -CW 3  -RH    Climbing 3-5 steps with a railing? 3  -EW 3  -CW 3  -RH    To walk in hospital room? 3  -EW 3  -CW 3  -RH    AM-PAC 6 Clicks Score 20  -EW 20  -CW 20  -RH    Functional Assessment    Outcome Measure Options AM-PAC 6 Clicks Basic Mobility (PT)  -EW AM-PAC 6 Clicks Basic Mobility (PT)  -CW       02/04/17 1500          How much help from another person do you currently need...    Turning from your back to your side while in flat bed without using bedrails? 4  -SV      Moving from lying on back to sitting on the side of a flat bed without bedrails? 4  -SV      Moving to and from a bed to a chair (including a wheelchair)? 3  -SV      Standing up from a chair using your arms (e.g., wheelchair, bedside chair)? 3  -SV      Climbing 3-5 steps with a railing? 3  -SV      To walk in hospital room? 3  -SV      AM-PAC 6 Clicks Score 20  -SV      Functional Assessment    Outcome Measure Options AM-PAC 6 Clicks Basic Mobility (PT)  -SV        User Key  (r) = Recorded By, (t) = Taken By, (c) = Cosigned By    Initials Name Provider Type    EW Mirta Patino, BETSEY Physical Therapist    RH Jimy West, PTA Physical Therapy Assistant    SV Zaria Marvin, PT Physical Therapist    CW Magdiel Alcaraz Physical Therapy Assistant           Time Calculation:         PT Charges       02/07/17 1100          Time Calculation    Start Time 1048  -EW      Stop Time 1059  -EW      Time Calculation (min) 11 min  -EW      PT Received On 02/07/17  -EW      PT - Next Appointment 02/08/17  -EW        User Key  (r) = Recorded By, (t) = Taken By, (c) = Cosigned By    Initials Name Provider Type    EW Mirta Patino, PT Physical Therapist          Therapy Charges for Today     Code Description Service Date Service Provider  Modifiers Qty    [id number] HC PT THER PROC EA 15 MIN 2/7/2017 Mirta Patino, PT GP 1          PT G-Codes  Outcome Measure Options: AM-PAC 6 Clicks Basic Mobility (PT)    Mirta Patino, PT  2/7/2017

## 2017-02-07 NOTE — PROGRESS NOTES
[facility], [place], operated by [facility]  Inpatient Progress Note    Reason for Follow Up:  dysphagia    Subjective     Interval History:   No new issues. Would care consult noted.   Tolerating TF    Current Facility-Administered Medications:   •  albuterol (PROVENTIL) nebulizer solution 0.083% 2.5 mg/3mL, 2.5 mg, Nebulization, Q4H PRN, Willian Burgess MD  •  aspirin chewable tablet 81 mg, 81 mg, Per G Tube, Daily, Willian Burgess MD, 81 mg at 02/07/17 0947  •  calcium carbonate 1250 (500 CA) MG/5ML suspension 1,250 mg, 1,250 mg, Per G Tube, BID With Meals, Willian Burgess MD, 1,250 mg at 02/07/17 0946  •  dextrose 5 % and sodium chloride 0.9 % infusion, 75 mL/hr, Intravenous, Continuous, Willian Burgess MD, Last Rate: 75 mL/hr at 02/06/17 1019, 75 mL/hr at 02/06/17 1019  •  HYDROcodone-acetaminophen (NORCO) 5-325 MG per tablet 1 tablet, 1 tablet, Oral, Q4H PRN, Willian Burgess MD, 1 tablet at 02/05/17 1410  •  lisinopril (PRINIVIL,ZESTRIL) tablet 5 mg, 5 mg, Per G Tube, Q24H, Willian Burgess MD, 5 mg at 02/07/17 0947  •  miconazole nitrate (ALOE VESTA) 2 % ointment, , Topical, Q12H, Boaz Estrella MD, 1 application at 02/07/17 0948  •  mirtazapine (REMERON) tablet 7.5 mg, 7.5 mg, Per G Tube, Nightly, Willian Burgess MD, 7.5 mg at 02/06/17 2048  •  multivitamin (THERAGRAN) tablet 1 tablet, 1 tablet, Per G Tube, Daily, Willian Burgess MD, 1 tablet at 02/07/17 0947  •  Pharmacy to dose vancomycin, , Does not apply, Continuous PRN, Willian Burgess MD  •  piperacillin-tazobactam (ZOSYN) 3.375 g in dextrose 50 mL IVPB (premix), 3.375 g, Intravenous, Q8H, Willian Burgess MD, 3.375 g at 02/07/17 0947  •  polycarbophil (FIBERCON) tablet 625 mg, 625 mg, Per G Tube, BID, Willian Burgess MD, 625 mg at 02/07/17 0946  •  [DISCONTINUED] potassium chloride (MICRO-K) CR capsule 40 mEq, 40 mEq, Oral, PRN **OR** potassium chloride (KLOR-CON) packet 40 mEq, 40  mEq, Per G Tube, PRN, 40 mEq at 02/07/17 0631 **OR** potassium chloride 10 mEq in 100 mL IVPB, 10 mEq, Intravenous, Q1H PRN, Robin Corcoran MD, Last Rate: 100 mL/hr at 02/05/17 1807, 10 mEq at 02/05/17 1807  •  sodium chloride 0.9 % flush 1-10 mL, 1-10 mL, Intravenous, PRN, Willian Burgess MD  •  Insert peripheral IV, , , Once **AND** sodium chloride 0.9 % flush 10 mL, 10 mL, Intravenous, PRN, Anil Ho MD  •  vancomycin 1250 mg/250 mL 0.9% NS IVPB ([facility]), 1,250 mg, Intravenous, Q12H, Robin Corcoran MD, 1,250 mg at 02/07/17 0630  •  vitamin C (ASCORBIC ACID) tablet 500 mg, 500 mg, Per G Tube, Daily, Willian Burgess MD, 500 mg at 02/07/17 0947  •  zolpidem (AMBIEN) tablet 2.5 mg, 2.5 mg, Oral, Nightly PRN, Robin Corcoran MD  Review of Systems:    reviewed and neg except hpi    Objective     Vital Signs  Temp:  [98.1 °F (36.7 °C)-98.5 °F (36.9 °C)] (P) 98.3 °F (36.8 °C)  Heart Rate:  [61-81] (P) 68  Resp:  [18] (P) 18  BP: (129-137)/(71-86) (P) 146/88  Body mass index is 21.09 kg/(m^2).    Intake/Output Summary (Last 24 hours) at 02/07/17 1159  Last data filed at 02/07/17 0800   Gross per 24 hour   Intake    815 ml   Output    850 ml   Net    -35 ml     I/O this shift:  In: -   Out: 300 [Urine:300]       Physical Exam:   General: patient awake, alert and cooperative   Eyes: Normal lids and lashes, no scleral icterus   Neck: supple, normal ROM   Skin: warm and dry, not jaundiced   Abdomen: soft, tender around g-tube - new dressing and holster per wound care   Rectal: deferred   Psychiatric: Normal mood and behavior      Results Review:     I reviewed the patient's new clinical results.      Results from last 7 days  Lab Units 02/07/17 0452 02/06/17  0545 02/05/17  0515   WBC 10*3/mm3 8.96 11.33* 10.76*   HEMOGLOBIN g/dL 9.0* 9.4* 9.6*   HEMATOCRIT % 28.7* 29.6* 30.0*   PLATELETS 10*3/mm3 365 419 424         Results from last 7 days  Lab Units 02/07/17 0452 02/06/17  0545  02/05/17  2308 02/05/17  0516  02/03/17  1651   SODIUM mmol/L 138 136  --  136  < > 132*   POTASSIUM mmol/L 3.4* 3.5 3.7 3.2*  < > 3.9   CHLORIDE mmol/L 100 100  --  97*  < > 89*   TOTAL CO2 mmol/L 27.8 25.0  --  22.8  < > 32.5*   BUN mg/dL 7* 6*  --  6*  < > 13   CREATININE mg/dL 0.61* 0.61*  --  0.50*  < > 0.61*   CALCIUM mg/dL 8.0* 8.3*  --  8.5*  < > 9.7   BILIRUBIN mg/dL  --   --   --   --   --  0.6   ALK PHOS U/L  --   --   --   --   --  84   ALT (SGPT) U/L  --   --   --   --   --  13   AST (SGOT) U/L  --   --   --   --   --  16   GLUCOSE mg/dL 138* 166*  --  84  < > 135*   < > = values in this interval not displayed.      Results from last 7 days  Lab Units 02/03/17  1651   INR  1.19*       No results found for: LIPASE    Radiology:    Imaging Results (last 24 hours)     Procedure Component Value Units Date/Time    CT Chest Without Contrast [[id number]] Collected:  02/04/17 1821     Updated:  02/05/17 1839    Narrative:       CT CHEST WITHOUT CONTRAST     HISTORY: 66-year-old male with an abnormal chest radiograph. The history  states the patient is asymptomatic. Previous history of head and neck  cancer. Tracheostomy.     TECHNIQUE: 3 mm images were obtained through the chest without the  administration of IV contrast. Compared with chest radiograph performed  yesterday.     FINDINGS: There is a large dense masslike airspace consolidation  completely opacifying the lateral segment of the right middle lobe.  There is also a masslike airspace consolidation at the posterior aspect  of the right lower lobe. There is lateral pleural thickening between the  right middle lobe and the right lower lobe airspace consolidations and  there is a minimal right pleural effusion. There is a reticular nodular  opacity within the lingula and there is mild atelectatic change at the  left lung base. There is fullness of the right hilum, but the  noncontrasted technique limits characterization and visualization of a  discrete  node. There is an enlarged subcarinal node measuring 2.6 x 2.0  cm. Small volume of mucus/secretions are noted within the dependent  aspect of the trachea. Percutaneous G-tube is noted in the visualized  upper abdomen.       Impression:       1. The large dense masslike airspace consolidations within the right  middle lobe and right lower lobe are of uncertain etiology. Extensive  pneumonia is possible and there may be some chronicity given the  adjacent pleural thickening. However, a malignancy cannot be excluded.  There is right hilar fullness and subcarinal lymphadenopathy as  described. The lymphadenopathy may be reactive, but may be metastatic as  well.  2. Reticular nodular opacity within the lingula is suspected to  represent an acute infectious infiltrate.  3. Small volume of mucus/secretions are noted within the dependent  aspect of the trachea.     This report was finalized on 2/5/2017 6:36 PM by Dr. Kayla Pressley MD.       FL Video Swallow [[id number]] Collected:  02/06/17 1011     Updated:  02/06/17 1015    Narrative:       BARIUM SWALLOW WITH VIDEO     CLINICAL HISTORY:   Male who is 66 years-old, with dysphagia.     TECHNIQUE: The swallowing mechanism was evaluated with real-time  fluoroscopic imaging, captured on digital disk and performed in  conjunction with speech pathologist (please see report).     FINDINGS: Moderate to severe oropharyngeal dysphagia characterized by  posterior spillage resulting in deep silent penetration of thin liquids  before, during & after the swallow. Eventual trace aspiration occurred  during & after the swallow w/a throat clear in response. With nectar  thick, honey thick & puree deep silent penetration occurred before,  during & after the swallow; although not observed will likely aspirate  over time.        Impression:       Moderate to severe risk of aspiration.     FLUOROSCOPY TIME: 1 minute 52 seconds, 4 images      This report was finalized on 2/6/2017 10:12 AM by   Willy Aden MD.               Assessment/Plan     Patient Active Problem List   Diagnosis Code   • Pneumonia of right lower lobe due to infectious organism J18.9   • Dementia without behavioral disturbance F03.90   • Dysphagia R13.10       Kathy Randle, APRN  02/07/17  11:59 AM      Soreness around peg has improved.  No complaints at this time.  Tolerating tf    abd - peg - vertical clamp in place, skin less erythematous, no tenderness or purulence        Assessment:  Pneumonia of right lower lobe due to infectious organism  ? Pulmonary mass  Dysphagia with history of G tube-VFSS  Significant risk of aspiration with po feeds.      Recommendations:  NPO per speech eval   Continue tube feeds per nutrition recs  If family decides to pursue oral feeds for QOL given risks, follow diet recommended by speech  Wound care does not carry vertical holister clamps but did place a horizontal clamp to prevent leakage. Wound care recommends antifungal barrier ointment and a nonocclusive dressing    No additional recs at this time - will sign off

## 2017-02-07 NOTE — PLAN OF CARE
Problem: Patient Care Overview (Adult)  Goal: Plan of Care Review  Outcome: Ongoing (interventions implemented as appropriate)    02/07/17 0533   Coping/Psychosocial Response Interventions   Plan Of Care Reviewed With patient   Patient Care Overview   Progress no change       Goal: Adult Individualization and Mutuality  Outcome: Ongoing (interventions implemented as appropriate)  Goal: Discharge Needs Assessment  Outcome: Ongoing (interventions implemented as appropriate)    Problem: Fall Risk (Adult)  Goal: Absence of Falls  Outcome: Ongoing (interventions implemented as appropriate)    Problem: Pneumonia (Adult)  Goal: Signs and Symptoms of Listed Potential Problems Will be Absent or Manageable (Pneumonia)  Outcome: Ongoing (interventions implemented as appropriate)    Problem: Nutrition, Enteral (Adult)  Goal: Signs and Symptoms of Listed Potential Problems Will be Absent or Manageable (Nutrition, Enteral)  Outcome: Ongoing (interventions implemented as appropriate)

## 2017-02-07 NOTE — NURSING NOTE
WOCN follow up for PEG tube leaking. Dr Ingram had noted possible use of vertical tube attatchement device. We don't have that in stock at this time but we do have a horizontal one. I removed the gauze from the around the PEG and cleansed the skin with soap and water. I used stomahesive powder and no sting skin barrier to get the skin dry. There continues to be denuded and reddened skin around the tube. Placed the attachment divide and added a small piece of stomahesive powder just below the tube for extra protection/absorption. Will check on patient tomorrow. If dressing stays in place, it could be on for a week but we will likely change it on Thurs or Fri depending on the drainage. Staff can continue to clean around the opening with gauze to keep area more dry. Recommend to continue using skin barrier ointment (antifungal) on the exposed skin on the abdomen that is reddened.

## 2017-02-07 NOTE — PLAN OF CARE
Problem: Patient Care Overview (Adult)  Goal: Plan of Care Review  Outcome: Ongoing (interventions implemented as appropriate)    02/07/17 105   Coping/Psychosocial Response Interventions   Plan Of Care Reviewed With patient   Patient Care Overview   Progress improving   Outcome Evaluation   Outcome Summary/Follow up Plan Pt doing well with ambulation, increased independence with bed mobility and transfers and no c/o this session. Will progress as tolerated.

## 2017-02-07 NOTE — PROGRESS NOTES
LOS: 4 days   Patient Care Team:  No Known Provider as PCP - General  No Known Provider as PCP - Family Medicine    Chief Complaint:      Subjective: No complaints.  He is resting comfortably in bed.  No s/s acute distress. He is alert and responding to verbal commands.  He has no recollection of our conversation yesterday or even my visit.        Objective     Vital Signs  Temp:  [98.3 °F (36.8 °C)-98.5 °F (36.9 °C)] 98.5 °F (36.9 °C)  Heart Rate:  [61-71] 62  Resp:  [18] 18  BP: (129-146)/(69-88) 143/69  Body mass index is 21.09 kg/(m^2).    Intake/Output Summary (Last 24 hours) at 02/07/17 1643  Last data filed at 02/07/17 1441   Gross per 24 hour   Intake   1900 ml   Output   1050 ml   Net    850 ml     I/O this shift:  In: 1120 [I.V.:1000; Other:120]  Out: 500 [Urine:500]    Physical Exam:  General Appearance: Well-developed white male resting in bed does not appear in any acute distress  Eyes: Conjunctiva are clear and anicteric pupils are unequal the right is about 2.5 mm and the left is about 3.5 mm both react to light has not really changed  ENT: Mucous membranes are little dry voice is hoarse  Neck: He appears midline he has an old scar consistent with a prior tracheostomy  Lungs: Decreased, particularly bilateral bases. Chest expansion is symmetric.  Cardiac: Regular rate and rhythm no M/C/G/R.  Abdomen: Soft nontender no palpable organomegaly or masses he does have a left upper quadrant tube.  : Not examined  Musc/Skel: Grossly normal  Skin: No jaundice no rashes no petechiae noted  Neuro: Definitely moves all 4 extremities but he is very confused, but cooperative.  Extremities/P Vascular: No clubbing cyanosis or edema palpable radial dorsalis pedis pulses  MSE: Pleasant and following commands.        Labs:  WBC No results found for: WBCS   HGB HEMOGLOBIN   Date Value Ref Range Status   02/07/2017 9.0 (L) 13.7 - 17.6 g/dL Final   02/06/2017 9.4 (L) 13.7 - 17.6 g/dL Final   02/05/2017 9.6 (L)  13.7 - 17.6 g/dL Final      HCT HEMATOCRIT   Date Value Ref Range Status   02/07/2017 28.7 (L) 40.4 - 52.2 % Final   02/06/2017 29.6 (L) 40.4 - 52.2 % Final   02/05/2017 30.0 (L) 40.4 - 52.2 % Final      Platlets No results found for: LABPLAT     PT/INR:    No results found for: PROTIME/  No results found for: INR    Sodium SODIUM   Date Value Ref Range Status   02/07/2017 138 136 - 145 mmol/L Final   02/06/2017 136 136 - 145 mmol/L Final   02/05/2017 136 136 - 145 mmol/L Final      Potassium POTASSIUM   Date Value Ref Range Status   02/07/2017 3.4 (L) 3.5 - 5.2 mmol/L Final   02/06/2017 3.5 3.5 - 5.2 mmol/L Final   02/05/2017 3.7 3.5 - 5.2 mmol/L Final   02/05/2017 3.2 (L) 3.5 - 5.2 mmol/L Final      Chloride CHLORIDE   Date Value Ref Range Status   02/07/2017 100 98 - 107 mmol/L Final   02/06/2017 100 98 - 107 mmol/L Final   02/05/2017 97 (L) 98 - 107 mmol/L Final      Bicarbonate No results found for: PLASMABICARB   BUN BUN   Date Value Ref Range Status   02/07/2017 7 (L) 8 - 23 mg/dL Final   02/06/2017 6 (L) 8 - 23 mg/dL Final   02/05/2017 6 (L) 8 - 23 mg/dL Final      Creatinine CREATININE   Date Value Ref Range Status   02/07/2017 0.61 (L) 0.76 - 1.27 mg/dL Final   02/06/2017 0.61 (L) 0.76 - 1.27 mg/dL Final   02/05/2017 0.50 (L) 0.76 - 1.27 mg/dL Final      Calcium CALCIUM   Date Value Ref Range Status   02/07/2017 8.0 (L) 8.6 - 10.5 mg/dL Final   02/06/2017 8.3 (L) 8.6 - 10.5 mg/dL Final   02/05/2017 8.5 (L) 8.6 - 10.5 mg/dL Final      Magnesium No results found for: MG         pH No results found for: PHART   pO2 No results found for: PO2ART   pCO2 No results found for: QCZ0NIG   HCO3 No results found for: HHC4ESO       aspirin 81 mg Per G Tube Daily   calcium carbonate 1,250 mg Per G Tube BID With Meals   lisinopril 5 mg Per G Tube Q24H   miconazole nitrate  Topical Q12H   mirtazapine 7.5 mg Per G Tube Nightly   multivitamin 1 tablet Per G Tube Daily   piperacillin-tazobactam 3.375 g Intravenous Q8H    polycarbophil 625 mg Per G Tube BID   vancomycin 1,250 mg Intravenous Q12H   vitamin C 500 mg Per G Tube Daily       dextrose 5 % and sodium chloride 0.9 % 75 mL/hr Last Rate: 75 mL/hr (02/07/17 1441)   Pharmacy to dose vancomycin         Diagnostics:  Imaging Results (last 24 hours)     Procedure Component Value Units Date/Time    CT Chest Without Contrast [[id number]] Collected:  02/04/17 1821     Updated:  02/05/17 1839    Narrative:       CT CHEST WITHOUT CONTRAST     HISTORY: 66-year-old male with an abnormal chest radiograph. The history  states the patient is asymptomatic. Previous history of head and neck  cancer. Tracheostomy.     TECHNIQUE: 3 mm images were obtained through the chest without the  administration of IV contrast. Compared with chest radiograph performed  yesterday.     FINDINGS: There is a large dense masslike airspace consolidation  completely opacifying the lateral segment of the right middle lobe.  There is also a masslike airspace consolidation at the posterior aspect  of the right lower lobe. There is lateral pleural thickening between the  right middle lobe and the right lower lobe airspace consolidations and  there is a minimal right pleural effusion. There is a reticular nodular  opacity within the lingula and there is mild atelectatic change at the  left lung base. There is fullness of the right hilum, but the  noncontrasted technique limits characterization and visualization of a  discrete node. There is an enlarged subcarinal node measuring 2.6 x 2.0  cm. Small volume of mucus/secretions are noted within the dependent  aspect of the trachea. Percutaneous G-tube is noted in the visualized  upper abdomen.       Impression:       1. The large dense masslike airspace consolidations within the right  middle lobe and right lower lobe are of uncertain etiology. Extensive  pneumonia is possible and there may be some chronicity given the  adjacent pleural thickening. However, a malignancy  cannot be excluded.  There is right hilar fullness and subcarinal lymphadenopathy as  described. The lymphadenopathy may be reactive, but may be metastatic as  well.  2. Reticular nodular opacity within the lingula is suspected to  represent an acute infectious infiltrate.  3. Small volume of mucus/secretions are noted within the dependent  aspect of the trachea.     This report was finalized on 2/5/2017 6:36 PM by Dr. Kayla Pressley MD.       FL Video Swallow [[id number]] Collected:  02/06/17 1011     Updated:  02/06/17 1015    Narrative:       BARIUM SWALLOW WITH VIDEO     CLINICAL HISTORY:   Male who is 66 years-old, with dysphagia.     TECHNIQUE: The swallowing mechanism was evaluated with real-time  fluoroscopic imaging, captured on digital disk and performed in  conjunction with speech pathologist (please see report).     FINDINGS: Moderate to severe oropharyngeal dysphagia characterized by  posterior spillage resulting in deep silent penetration of thin liquids  before, during & after the swallow. Eventual trace aspiration occurred  during & after the swallow w/a throat clear in response. With nectar  thick, honey thick & puree deep silent penetration occurred before,  during & after the swallow; although not observed will likely aspirate  over time.        Impression:       Moderate to severe risk of aspiration.     FLUOROSCOPY TIME: 1 minute 52 seconds, 4 images      This report was finalized on 2/6/2017 10:12 AM by Dr. Willy Aden MD.           I have reviewed results of  Video Swallow.  Will defer treatment to speech pathologist.        Assessment/Plan     Patient Active Problem List   Diagnosis Code   • Pneumonia of right lower lobe due to infectious organism J18.9   • Dementia without behavioral disturbance F03.90   • Dysphagia R13.10     Impression:    #1 dense right masslike airspace consolidations with adjacent pleural thickening and right hilar and subcarinal lymphadenopathy , only very minimal white  count elevation on admission no fever, normal pro-calcitonin. These things do make one suspicious of the diagnosis of infectious pneumonia. He does have a history of dysphagia and certainly aspiration has to be considered the peripheral distribution of these are somewhat against this. Differential includes infection, noninfectious pneumonia/pneumonitis  such as cryptogenic organizing pneumonia have to be considered and obviously malignancy particularly with the what I'm told is a heavy smoking history. also have to consider a metastatic process given his prior squamous carcinoma the head and neck. The patient probably does need bronchoscopic evaluation biopsy E BUS to biopsy these lymph nodes to rule out malignancy for interstitial disease one would have to consider more aggressive surgical biopsy or empiric therapy. In a patient with dysphagia, putting him on immunosuppressants whether it's significant dose steroids and/or steroid sparing immunosuppressants do carry significant risks. If this were to be malignancy with the extent that's present in his functional class I don't think he would be a candidate even for chemotherapy. I think the big issue is how much to put this gentleman through trying to find out what this is versus just treating for infection and monitoring to see how he responds. I think a lot of this depends on whether the family, since he is incapable of making a decision, would want to treat him if he had one of these problems. I certainly wouldn't want to putting him through the risk of biopsies etc. if we were not going to treat. I discussed this with his sister in great detail and she wants to talk with her other sister and  and they will get back with us.  I have still not heard back discussed with one sister not POA tad yesterday and with patient and today he does not remember any of our conversation or even my visit.His HCS will have to make decision and consent.  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

## 2017-02-07 NOTE — PROGRESS NOTES
" LOS: 4 days   Primary Care Physician: No Known Provider     Subjective  Pt resting comfortably. Arousable. No complaints and denies CP SOA NVD. Unsure of why he is in hospital but will follow commands.    Vital Signs  Body mass index is 21.09 kg/(m^2).  Temp:  [98.3 °F (36.8 °C)-98.5 °F (36.9 °C)] 98.5 °F (36.9 °C)  Heart Rate:  [61-71] 62  Resp:  [18] 18  BP: (129-146)/(69-88) 143/69      Objective:  General Appearance:  Comfortable, well-appearing, in no acute distress and not in pain.    Vital signs: (most recent): Blood pressure 143/69, pulse 62, temperature 98.5 °F (36.9 °C), temperature source Oral, resp. rate 18, height 70\" (177.8 cm), weight 147 lb (66.7 kg), SpO2 97 %.    Lungs:  Normal respiratory rate and normal effort.  He is not in respiratory distress.  No stridor.  There are rhonchi and decreased breath sounds.  No wheezes or rales.    Heart: Normal rate.  Regular rhythm.    Abdomen: Abdomen is soft and non-distended.  Bowel sounds are normal.   (PEG site dressed).     Extremities: There is no dependent edema.    Pulses: Distal pulses are intact.    Neurological: Patient is alert and oriented to person, place and time.    Pupils:  Pupils are equal, round, and reactive to light.    Skin:  Warm and dry.                 Results Review:    I reviewed the patient's new clinical results.      Results from last 7 days  Lab Units 02/07/17  0452 02/06/17  0545   WBC 10*3/mm3 8.96 11.33*   HEMOGLOBIN g/dL 9.0* 9.4*   PLATELETS 10*3/mm3 365 419       Results from last 7 days  Lab Units 02/07/17  0452 02/06/17  0545   SODIUM mmol/L 138 136   POTASSIUM mmol/L 3.4* 3.5   CHLORIDE mmol/L 100 100   TOTAL CO2 mmol/L 27.8 25.0   BUN mg/dL 7* 6*   CREATININE mg/dL 0.61* 0.61*   CALCIUM mg/dL 8.0* 8.3*   GLUCOSE mg/dL 138* 166*       Results from last 7 days  Lab Units 02/03/17  1651   INR  1.19*     Hemoglobin A1C:  Lab Results   Component Value Date    HGBA1C 5.37 02/03/2017       Glucose Range:No results found for: " POCGLU    Medication Review: Yes    Physical Therapy:    Assessment/Plan     Active Hospital Problems (** Indicates Principal Problem)    Diagnosis Date Noted   • **Pneumonia of right lower lobe due to infectious organism [J18.9] 02/03/2017   • Dementia without behavioral disturbance [F03.90] 02/06/2017   • Dysphagia [R13.10] 02/06/2017      Resolved Hospital Problems    Diagnosis Date Noted Date Resolved   No resolved problems to display.       Assessment & Plan  -Pulm and GI cs.  -Attempted to contact emergency contact in chart without answer. Reviewed Pulm note about need for GOC discussion prior to additional workup for PNA vs mass. Will consult Palliative to aid in this discussion and will attempt to contact again tomorrow.  -TF via PEG in place with some peristomal inflammation. NPO until GOC are determined. SLP evaluation appreciated. Wound care and nutrition are following.  -Continue Zosyn to cover for aspiration PNA. WBC ok. Afebrile.    Disposition: SNF    Boaz Estrella MD  02/07/17  5:12 PM

## 2017-02-08 PROBLEM — Z66 DNR (DO NOT RESUSCITATE): Status: ACTIVE | Noted: 2017-02-08

## 2017-02-08 LAB
ANION GAP SERPL CALCULATED.3IONS-SCNC: 11.8 MMOL/L
BACTERIA SPEC AEROBE CULT: NORMAL
BACTERIA SPEC AEROBE CULT: NORMAL
BUN BLD-MCNC: 8 MG/DL (ref 8–23)
BUN/CREAT SERPL: 13.1 (ref 7–25)
CALCIUM SPEC-SCNC: 8.3 MG/DL (ref 8.6–10.5)
CHLORIDE SERPL-SCNC: 102 MMOL/L (ref 98–107)
CO2 SERPL-SCNC: 26.2 MMOL/L (ref 22–29)
CREAT BLD-MCNC: 0.61 MG/DL (ref 0.76–1.27)
DEPRECATED RDW RBC AUTO: 47.1 FL (ref 37–54)
ERYTHROCYTE [DISTWIDTH] IN BLOOD BY AUTOMATED COUNT: 14.8 % (ref 11.5–14.5)
GFR SERPL CREATININE-BSD FRML MDRD: 131 ML/MIN/1.73
GLUCOSE BLD-MCNC: 139 MG/DL (ref 65–99)
HCT VFR BLD AUTO: 27.9 % (ref 40.4–52.2)
HGB BLD-MCNC: 8.9 G/DL (ref 13.7–17.6)
MAGNESIUM SERPL-MCNC: 1.5 MG/DL (ref 1.6–2.4)
MCH RBC QN AUTO: 27.4 PG (ref 27–32.7)
MCHC RBC AUTO-ENTMCNC: 31.9 G/DL (ref 32.6–36.4)
MCV RBC AUTO: 85.8 FL (ref 79.8–96.2)
PLATELET # BLD AUTO: 389 10*3/MM3 (ref 140–500)
PMV BLD AUTO: 9.2 FL (ref 6–12)
POTASSIUM BLD-SCNC: 3.8 MMOL/L (ref 3.5–5.2)
RBC # BLD AUTO: 3.25 10*6/MM3 (ref 4.6–6)
SODIUM BLD-SCNC: 140 MMOL/L (ref 136–145)
WBC NRBC COR # BLD: 9.02 10*3/MM3 (ref 4.5–10.7)

## 2017-02-08 PROCEDURE — 80048 BASIC METABOLIC PNL TOTAL CA: CPT | Performed by: INTERNAL MEDICINE

## 2017-02-08 PROCEDURE — 25010000002 VANCOMYCIN: Performed by: HOSPITALIST

## 2017-02-08 PROCEDURE — 25810000003 DEXTROSE-NACL PER 500 ML: Performed by: INTERNAL MEDICINE

## 2017-02-08 PROCEDURE — 85027 COMPLETE CBC AUTOMATED: CPT | Performed by: INTERNAL MEDICINE

## 2017-02-08 PROCEDURE — 83735 ASSAY OF MAGNESIUM: CPT | Performed by: INTERNAL MEDICINE

## 2017-02-08 PROCEDURE — 25010000002 PIPERACILLIN SOD-TAZOBACTAM PER 1 G: Performed by: INTERNAL MEDICINE

## 2017-02-08 PROCEDURE — 97110 THERAPEUTIC EXERCISES: CPT

## 2017-02-08 RX ORDER — DIPHENOXYLATE HYDROCHLORIDE AND ATROPINE SULFATE 2.5; .025 MG/1; MG/1
1 TABLET ORAL
Status: CANCELLED | OUTPATIENT
Start: 2017-02-08

## 2017-02-08 RX ORDER — LORAZEPAM 2 MG/ML
0.5 CONCENTRATE ORAL
Status: CANCELLED | OUTPATIENT
Start: 2017-02-08 | End: 2017-02-18

## 2017-02-08 RX ORDER — ACETAMINOPHEN 650 MG/1
650 SUPPOSITORY RECTAL EVERY 4 HOURS PRN
Status: CANCELLED | OUTPATIENT
Start: 2017-02-08

## 2017-02-08 RX ORDER — LORAZEPAM 2 MG/ML
2 INJECTION INTRAMUSCULAR
Status: CANCELLED | OUTPATIENT
Start: 2017-02-08 | End: 2017-02-18

## 2017-02-08 RX ORDER — LORAZEPAM 0.5 MG/1
0.5 TABLET ORAL
Status: CANCELLED | OUTPATIENT
Start: 2017-02-08 | End: 2017-02-18

## 2017-02-08 RX ORDER — LORAZEPAM 2 MG/ML
1 CONCENTRATE ORAL
Status: CANCELLED | OUTPATIENT
Start: 2017-02-08 | End: 2017-02-18

## 2017-02-08 RX ORDER — LORAZEPAM 2 MG/ML
1 INJECTION INTRAMUSCULAR
Status: CANCELLED | OUTPATIENT
Start: 2017-02-08 | End: 2017-02-18

## 2017-02-08 RX ORDER — GLYCOPYRROLATE 0.2 MG/ML
0.4 INJECTION INTRAMUSCULAR; INTRAVENOUS
Status: CANCELLED | OUTPATIENT
Start: 2017-02-08

## 2017-02-08 RX ORDER — GLYCOPYRROLATE 0.2 MG/ML
0.2 INJECTION INTRAMUSCULAR; INTRAVENOUS
Status: CANCELLED | OUTPATIENT
Start: 2017-02-08

## 2017-02-08 RX ORDER — AMOXICILLIN AND CLAVULANATE POTASSIUM 875; 125 MG/1; MG/1
1 TABLET, FILM COATED ORAL EVERY 12 HOURS SCHEDULED
Status: DISCONTINUED | OUTPATIENT
Start: 2017-02-08 | End: 2017-02-09 | Stop reason: HOSPADM

## 2017-02-08 RX ORDER — ACETAMINOPHEN 325 MG/1
650 TABLET ORAL EVERY 4 HOURS PRN
Status: CANCELLED | OUTPATIENT
Start: 2017-02-08

## 2017-02-08 RX ORDER — LORAZEPAM 2 MG/ML
0.5 INJECTION INTRAMUSCULAR
Status: CANCELLED | OUTPATIENT
Start: 2017-02-08 | End: 2017-02-18

## 2017-02-08 RX ORDER — ACETAMINOPHEN 160 MG/5ML
650 SOLUTION ORAL EVERY 4 HOURS PRN
Status: CANCELLED | OUTPATIENT
Start: 2017-02-08

## 2017-02-08 RX ORDER — LORAZEPAM 2 MG/ML
2 CONCENTRATE ORAL
Status: CANCELLED | OUTPATIENT
Start: 2017-02-08 | End: 2017-02-18

## 2017-02-08 RX ORDER — LORAZEPAM 1 MG/1
1 TABLET ORAL
Status: CANCELLED | OUTPATIENT
Start: 2017-02-08 | End: 2017-02-18

## 2017-02-08 RX ORDER — LORAZEPAM 1 MG/1
2 TABLET ORAL
Status: CANCELLED | OUTPATIENT
Start: 2017-02-08 | End: 2017-02-18

## 2017-02-08 RX ADMIN — CALCIUM CARBONATE 1250 MG: 1250 SUSPENSION ORAL at 17:35

## 2017-02-08 RX ADMIN — Medication: at 22:49

## 2017-02-08 RX ADMIN — ASPIRIN 81 MG: 81 TABLET, CHEWABLE ORAL at 08:07

## 2017-02-08 RX ADMIN — CALCIUM POLYCARBOPHIL 625 MG: 625 TABLET ORAL at 17:34

## 2017-02-08 RX ADMIN — VANCOMYCIN HYDROCHLORIDE 1250 MG: 1 INJECTION, POWDER, LYOPHILIZED, FOR SOLUTION INTRAVENOUS at 05:30

## 2017-02-08 RX ADMIN — AMOXICILLIN AND CLAVULANATE POTASSIUM 1 TABLET: 875; 125 TABLET, FILM COATED ORAL at 20:57

## 2017-02-08 RX ADMIN — LISINOPRIL 5 MG: 5 TABLET ORAL at 08:07

## 2017-02-08 RX ADMIN — CALCIUM POLYCARBOPHIL 625 MG: 625 TABLET ORAL at 08:07

## 2017-02-08 RX ADMIN — CALCIUM CARBONATE 1250 MG: 1250 SUSPENSION ORAL at 08:09

## 2017-02-08 RX ADMIN — TAZOBACTAM SODIUM AND PIPERACILLIN SODIUM 3.38 G: 375; 3 INJECTION, SOLUTION INTRAVENOUS at 12:22

## 2017-02-08 RX ADMIN — AMOXICILLIN AND CLAVULANATE POTASSIUM 1 TABLET: 875; 125 TABLET, FILM COATED ORAL at 17:34

## 2017-02-08 RX ADMIN — Medication 1 TABLET: at 08:07

## 2017-02-08 RX ADMIN — MIRTAZAPINE 7.5 MG: 15 TABLET, FILM COATED ORAL at 20:57

## 2017-02-08 RX ADMIN — OXYCODONE HYDROCHLORIDE AND ACETAMINOPHEN 500 MG: 500 TABLET ORAL at 08:07

## 2017-02-08 RX ADMIN — TAZOBACTAM SODIUM AND PIPERACILLIN SODIUM 3.38 G: 375; 3 INJECTION, SOLUTION INTRAVENOUS at 02:00

## 2017-02-08 RX ADMIN — DEXTROSE AND SODIUM CHLORIDE 75 ML/HR: 5; 900 INJECTION, SOLUTION INTRAVENOUS at 22:49

## 2017-02-08 NOTE — PROGRESS NOTES
Continued Stay Note  [facility]     Patient Name: Neville Mendez  MRN: [id number]  Today's Date: 2/8/2017    Admit Date: 2/3/2017          Discharge Plan       02/08/17 1231    Case Management/Social Work Plan    Plan Return to [facility]- 14 day Medicaid bed hold    Patient/Family In Agreement With Plan yes    Additional Comments Patient may return to [facility]- has 14 day Medicaid bed hold.  CCP will continue to follow....Fabiola Sharma RN,CCP               Discharge Codes     None            Fabiola Sharma RN

## 2017-02-08 NOTE — PROGRESS NOTES
LOS: 5 days   Primary Care Physician: No Known Provider       Subjective  Pt more awake and active today. Denies SOA or CP. Denies NVD. Tolerating TF well.  History taken from: patient chart RN       Physical Exam   Constitutional: He appears well-developed and well-nourished. No distress.   HENT:   Head: Normocephalic and atraumatic.   Eyes: EOM are normal. Pupils are equal, round, and reactive to light.   Neck: Normal range of motion. Neck supple.   Cardiovascular: Normal rate, regular rhythm and intact distal pulses.    Pulmonary/Chest: Effort normal. No stridor. No respiratory distress. He has decreased breath sounds. He has no wheezes. He has no rhonchi. He has rales.   Abdominal: Soft. Bowel sounds are normal. He exhibits no distension.   Musculoskeletal: Normal range of motion. He exhibits no edema.   Neurological: He is alert. No cranial nerve deficit.   Skin: Skin is warm and dry. He is not diaphoretic.   Psychiatric: He has a normal mood and affect. His behavior is normal.   Nursing note and vitals reviewed.      Vital Signs  Body mass index is 21.09 kg/(m^2).  Temp:  [98.2 °F (36.8 °C)-98.6 °F (37 °C)] 98.2 °F (36.8 °C)  Heart Rate:  [68-75] 75  Resp:  [16-18] 18  BP: (131-143)/(68-97) 143/87      Results Review:     I reviewed the patient's new clinical results.      Results from last 7 days  Lab Units 02/08/17  0336 02/07/17  0452   WBC 10*3/mm3 9.02 8.96   HEMOGLOBIN g/dL 8.9* 9.0*   PLATELETS 10*3/mm3 389 365       Results from last 7 days  Lab Units 02/08/17  0336 02/07/17  1751 02/07/17  0452   SODIUM mmol/L 140  --  138   POTASSIUM mmol/L 3.8 4.3 3.4*   CHLORIDE mmol/L 102  --  100   TOTAL CO2 mmol/L 26.2  --  27.8   BUN mg/dL 8  --  7*   CREATININE mg/dL 0.61*  --  0.61*   CALCIUM mg/dL 8.3*  --  8.0*   GLUCOSE mg/dL 139*  --  138*       Results from last 7 days  Lab Units 02/03/17  1651   INR  1.19*     Hemoglobin A1C:  Lab Results   Component Value Date    HGBA1C 5.37 02/03/2017       Glucose  Range:No results found for: POCGLU    Medication Review: Yes    Physical Therapy:    Assessment/Plan     Active Hospital Problems (** Indicates Principal Problem)    Diagnosis Date Noted   • **Pneumonia of right lower lobe due to infectious organism [J18.9] 02/03/2017   • DNR (do not resuscitate) [Z66] 02/08/2017   • Dementia without behavioral disturbance [F03.90] 02/06/2017   • Dysphagia [R13.10] 02/06/2017      Resolved Hospital Problems    Diagnosis Date Noted Date Resolved   No resolved problems to display.       Assessment & Plan  -Pulm and GI cs.  -Palliative to see today to discuss GOC with family. Discussed with nursing who were able to contact family and decided to make him conditional code. Will place order and follow up with palliative.  -Continue TF and NPO for now pending GOC discussion.  -Agree that he has clinically improved and will transition to augmentin for completion of antibiotic course.  -Dispo: Return to [facility]. Possibly tomorrow.    Boaz Estrella MD  02/08/17  1:23 PM    Time: 45min

## 2017-02-08 NOTE — PLAN OF CARE
Problem: Patient Care Overview (Adult)  Goal: Plan of Care Review  Outcome: Ongoing (interventions implemented as appropriate)    02/08/17 0450   Patient Care Overview   Progress improving         Problem: Fall Risk (Adult)  Goal: Absence of Falls  Outcome: Ongoing (interventions implemented as appropriate)    02/08/17 0450   Fall Risk (Adult)   Absence of Falls making progress toward outcome         Problem: Nutrition, Enteral (Adult)  Goal: Signs and Symptoms of Listed Potential Problems Will be Absent or Manageable (Nutrition, Enteral)  Outcome: Ongoing (interventions implemented as appropriate)    02/08/17 0450   Nutrition, Enteral   Problems Assessed (Enteral Nutrition) all   Problems Present (Enteral Nutrition) none

## 2017-02-08 NOTE — CONSULTS
"Purpose of the visit was to evaluate for: goals of care/advanced care planning. Spoke with RN as well as family and discussed palliative care, goals of care, care options, resuscitation status, [facility] bed status and discharge options.      Assessment:  Patient is palliative care appropriate for inpatient care given pneumonia with questionable mass like area on right with lymphadenopathy concerning for possible malignancy, severe oropharyngeal dysphagia with PEG, with underlying dementia, COPD, CAD, and history of head and neck squamous cell CA. Per nursing notes GCS 14, incontinent bowel / bladder, working with PT, PPS 40-50%, no specific complaints documented. No advance directive on chart; sisterYas,  noted to be decision maker. Current code status is conditional with several exceptions.     Recommendations/Plan: [facility]. Change code status to full comfort - A-N-D - no further procedures but family desires to maintain present level of care regarding tube feedings and medications. Consider [facility] evaluation for SNF or [facility]SB should patient decline. Goal is return to SNF for comfort care.    Other Comments: Spoke with patient's sisterYas by phone on two separate occasions. First earlier today and then later this evening. In between, she spoke with her other sister and based on conversation and her understanding of patient's condition and his stated wish to \"do nothing\", she requests full comfort care. She understands a transition to 4 for a short period to see how patient progresses and if stable, will return to SNF. Should he decline then perhaps HSB here. In the initial conversation, reviewed with her his current status and discussed palliative care, GOC, care options, and resuscitation status. Answered various questions and then she became tearful and requested to speak later in the day. On return call she stated she had spoken with her sister and they " had decided for comfort.     Offered psychosocial support, and insured she had contact information should she have further questions. RN was called and she is to call for orders.     Total time in direct counseling was 45 minutes.    Thanks for referral.

## 2017-02-08 NOTE — PLAN OF CARE
Problem: Patient Care Overview (Adult)  Goal: Plan of Care Review  Outcome: Ongoing (interventions implemented as appropriate)    02/08/17 1041   Coping/Psychosocial Response Interventions   Plan Of Care Reviewed With patient   Patient Care Overview   Progress progress toward functional goals as expected   Outcome Evaluation   Outcome Summary/Follow up Plan Pt is increasing with activity tolerance and transfer safety

## 2017-02-08 NOTE — PROGRESS NOTES
LOS: 5 days   Patient Care Team:  No Known Provider as PCP - General  No Known Provider as PCP - Family Medicine    Chief Complaint:      Subjective:   No complaints    Objective     Vital Signs  Temp:  [98.2 °F (36.8 °C)-98.6 °F (37 °C)] 98.2 °F (36.8 °C)  Heart Rate:  [68-75] 75  Resp:  [16-18] 18  BP: (131-143)/(68-97) 143/87  Body mass index is 21.09 kg/(m^2).    Intake/Output Summary (Last 24 hours) at 02/08/17 1217  Last data filed at 02/08/17 0800   Gross per 24 hour   Intake   1360 ml   Output    650 ml   Net    710 ml     I/O this shift:  In: 50 [Other:50]  Out: -     Physical Exam:  General Appearance: Well-developed white male resting in bed does not appear in any acute distress  Eyes: Conjunctiva are clear and anicteric pupils are unequal the right is about 2.5 mm and the left is about 3.5 mm both react to light has not really changed  ENT: Mucous membranes are moist  Neck: He appears midline he has an old scar consistent with a prior tracheostomy  Lungs: Decreased, particularly bilateral bases. Chest expansion is symmetric.  Cardiac: Regular rate and rhythm no M/C/G/R.  Abdomen: Soft nontender no palpable organomegaly or masses he does have a left upper quadrant tube.  : Not examined  Musc/Skel: Grossly normal  Skin: No jaundice no rashes no petechiae noted  Neuro: Definitely moves all 4 extremities but he is very confused, but cooperative.  Extremities/P Vascular: No clubbing cyanosis or edema palpable radial dorsalis pedis pulses  MSE: Pleasant and following commands.        Labs:  WBC No results found for: WBCS   HGB HEMOGLOBIN   Date Value Ref Range Status   02/08/2017 8.9 (L) 13.7 - 17.6 g/dL Final   02/07/2017 9.0 (L) 13.7 - 17.6 g/dL Final   02/06/2017 9.4 (L) 13.7 - 17.6 g/dL Final      HCT HEMATOCRIT   Date Value Ref Range Status   02/08/2017 27.9 (L) 40.4 - 52.2 % Final   02/07/2017 28.7 (L) 40.4 - 52.2 % Final   02/06/2017 29.6 (L) 40.4 - 52.2 % Final      Platlets No results  found for: LABPLAT     PT/INR:  No results found for: PROTIME/No results found for: INR    Sodium SODIUM   Date Value Ref Range Status   02/08/2017 140 136 - 145 mmol/L Final   02/07/2017 138 136 - 145 mmol/L Final   02/06/2017 136 136 - 145 mmol/L Final      Potassium POTASSIUM   Date Value Ref Range Status   02/08/2017 3.8 3.5 - 5.2 mmol/L Final   02/07/2017 4.3 3.5 - 5.2 mmol/L Final   02/07/2017 3.4 (L) 3.5 - 5.2 mmol/L Final   02/06/2017 3.5 3.5 - 5.2 mmol/L Final   02/05/2017 3.7 3.5 - 5.2 mmol/L Final      Chloride CHLORIDE   Date Value Ref Range Status   02/08/2017 102 98 - 107 mmol/L Final   02/07/2017 100 98 - 107 mmol/L Final   02/06/2017 100 98 - 107 mmol/L Final      Bicarbonate No results found for: PLASMABICARB   BUN BUN   Date Value Ref Range Status   02/08/2017 8 8 - 23 mg/dL Final   02/07/2017 7 (L) 8 - 23 mg/dL Final   02/06/2017 6 (L) 8 - 23 mg/dL Final      Creatinine CREATININE   Date Value Ref Range Status   02/08/2017 0.61 (L) 0.76 - 1.27 mg/dL Final   02/07/2017 0.61 (L) 0.76 - 1.27 mg/dL Final   02/06/2017 0.61 (L) 0.76 - 1.27 mg/dL Final      Calcium CALCIUM   Date Value Ref Range Status   02/08/2017 8.3 (L) 8.6 - 10.5 mg/dL Final   02/07/2017 8.0 (L) 8.6 - 10.5 mg/dL Final   02/06/2017 8.3 (L) 8.6 - 10.5 mg/dL Final      Magnesium MAGNESIUM   Date Value Ref Range Status   02/08/2017 1.5 (L) 1.6 - 2.4 mg/dL Final            pH No results found for: PHART   pO2 No results found for: PO2ART   pCO2 No results found for: PTV0ZPL   HCO3 No results found for: ZPI1TSF       aspirin 81 mg Per G Tube Daily   calcium carbonate 1,250 mg Per G Tube BID With Meals   lisinopril 5 mg Per G Tube Q24H   miconazole nitrate  Topical Q12H   mirtazapine 7.5 mg Per G Tube Nightly   multivitamin 1 tablet Per G Tube Daily   piperacillin-tazobactam 3.375 g Intravenous Q8H   polycarbophil 625 mg Per G Tube BID   vancomycin 1,250 mg Intravenous Q12H   vitamin C 500 mg Per G Tube Daily       dextrose 5 % and sodium  chloride 0.9 % 75 mL/hr Last Rate: 75 mL/hr (02/07/17 0571)   Pharmacy to dose vancomycin         Diagnostics:  Imaging Results (last 24 hours)     ** No results found for the last 24 hours. **              Assessment/Plan     Patient Active Problem List   Diagnosis Code   • Pneumonia of right lower lobe due to infectious organism J18.9   • Dementia without behavioral disturbance F03.90   • Dysphagia R13.10     Impression:  #1 dense right masslike airspace consolidations with adjacent pleural thickening and right hilar and subcarinal lymphadenopathy , only very minimal white count elevation on admission no fever, normal pro-calcitonin. These things do make one suspicious of the diagnosis of infectious pneumonia. He does have a history of dysphagia and certainly aspiration has to be considered the peripheral distribution of these are somewhat against this. Differential includes infection, noninfectious pneumonia/pneumonitis such as cryptogenic organizing pneumonia have to be considered and obviously malignancy particularly with the what I'm told is a heavy smoking history. also have to consider a metastatic process given his prior squamous carcinoma the head and neck. The patient probably does need bronchoscopic evaluation biopsy E BUS to biopsy these lymph nodes to rule out malignancy for interstitial disease one would have to consider more aggressive surgical biopsy or empiric therapy. In a patient with dysphagia, putting him on immunosuppressants whether it's significant dose steroids and/or steroid sparing immunosuppressants do carry significant risks. If this were to be malignancy with the extent that's present in his functional class I don't think he would be a candidate even for chemotherapy. I think the big issue is how much to put this gentleman through trying to find out what this is versus just treating for infection and monitoring to see how he responds. I think a lot of this depends on whether the  family, since he is incapable of making a decision, would want to treat him if he had one of these problems. I certainly wouldn't want to putting him through the risk of biopsies etc. nursing called today and the POA called again and they have decided to go conditional code and not put the patient through any procedures.  I recommend completing an antibiotic course and is doing pretty well we could probably change him over to Augmentin to complete probably a 10 day course given the the densities of these infiltrates if they are indeed even infectious.  #2 advanced dementia  #3 history of head and neck squamous cell carcinoma  #4 urinary artery disease    I think we ought to make a plan with the family that in some way that they can keep him comfortable at the nursing home should he have a progressive malignancy or not infective progressive pneumonitis or if he continues to aspirate which with his severe dysphagia and desire to continue to eat is almost certainly going to happen  Plan for disposition:    Janet Linn, MELISSA  02/08/17  12:17 PM    Time:

## 2017-02-08 NOTE — PLAN OF CARE
Problem: Patient Care Overview (Adult)  Goal: Plan of Care Review  Outcome: Ongoing (interventions implemented as appropriate)    02/08/17 1459   Coping/Psychosocial Response Interventions   Plan Of Care Reviewed With patient   Outcome Evaluation   Outcome Summary/Follow up Plan pt changed to conditional code; ABT discontinued; will continue to monitor       Goal: Discharge Needs Assessment  Outcome: Ongoing (interventions implemented as appropriate)    02/06/17 0548 02/06/17 1209   Discharge Needs Assessment   Concerns To Be Addressed --  no discharge needs identified   Readmission Within The Last 30 Days --  no previous admission in last 30 days   Equipment Needed After Discharge --  none   Current Discharge Risk cognitively impaired;chronically ill --    Discharge Disposition still a patient --    Current Health   Outpatient/Agency/Support Group Needs --  other (see comments)  (Return to [facility])   Anticipated Changes Related to Illness --  none   Living Environment   Transportation Available --  ambulance   Self-Care   Equipment Currently Used at Home --  wheelchair;walker, rolling         Problem: Fall Risk (Adult)  Goal: Absence of Falls  Outcome: Ongoing (interventions implemented as appropriate)    02/08/17 1459   Fall Risk (Adult)   Absence of Falls achieves outcome         Problem: Pneumonia (Adult)  Goal: Signs and Symptoms of Listed Potential Problems Will be Absent or Manageable (Pneumonia)  Outcome: Ongoing (interventions implemented as appropriate)    02/07/17 0533   Pneumonia   Problems Assessed (Pneumonia) all   Problems Present (Pneumonia) progression of infection

## 2017-02-08 NOTE — PROGRESS NOTES
Acute Care - Physical Therapy Treatment Note  [facility]     Patient Name: Neville Mendez  : 1950  MRN: [id number]  Today's Date: 2017  Onset of Illness/Injury or Date of Surgery Date: 17  Date of Referral to PT: 17  Referring Physician: Nilo    Admit Date: 2/3/2017    Visit Dx:    ICD-10-CM ICD-9-CM   1. Pneumonia of right lower lobe due to infectious organism J18.9 483.8   2. Bleeding from g-tube R58 459.0   3. Difficulty walking R26.2 719.7     Patient Active Problem List   Diagnosis   • Pneumonia of right lower lobe due to infectious organism   • Dementia without behavioral disturbance   • Dysphagia               Adult Rehabilitation Note       17 1000 17 1048 17 0900    Rehab Assessment/Intervention    Discipline physical therapy assistant  -CW physical therapist  -EW physical therapy assistant  -CW    Document Type therapy note (daily note)  -CW therapy note (daily note)  -EW therapy note (daily note)  -CW    Subjective Information agree to therapy;complains of;fatigue;pain  -CW agree to therapy  -EW agree to therapy;no complaints  -CW    Patient Effort, Rehab Treatment good  -CW good  -EW good  -CW    Precautions/Limitations fall precautions  -CW fall precautions  -EW fall precautions  -CW    Recorded by [CW] Magdiel Alcaraz [EW] Mirta Patino, PT [CW] Magdiel Alcaraz    Pain Assessment    Pain Assessment 0-10  -CW No/denies pain  -EW No/denies pain  -CW    Pain Score 4  -CW      Pain Location Abdomen  -CW      Recorded by [CW] Magdiel Alcaraz [EW] Mirta Patino, PT [CW] Magdiel Alcaraz    Cognitive Assessment/Intervention    Current Cognitive/Communication Assessment functional  -CW  functional  -CW    Orientation Status oriented x 4  -CW  oriented x 4  -CW    Follows Commands/Answers Questions 100% of the time  -CW  100% of the time;able to follow single-step instructions;needs cueing  -CW    Personal Safety WNL/WFL  -CW  WNL/WFL  -CW     Personal Safety Interventions fall prevention program maintained;gait belt;nonskid shoes/slippers when out of bed  -CW  fall prevention program maintained;gait belt;nonskid shoes/slippers when out of bed  -CW    Recorded by [CW] Magdiel Alcaraz  [CW] Magdiel Alcaraz    Bed Mobility, Assessment/Treatment    Bed Mob, Supine to Sit, [place] conditional independence  -CW conditional independence  -EW conditional independence  -CW    Bed Mob, Sit to Supine, [place] conditional independence  -CW conditional independence  -EW conditional independence  -CW    Recorded by [CW] Magdiel Alcaraz [EW] Mirta Patino, PT [CW] Magdiel Alcaraz    Transfer Assessment/Treatment    Transfers, Sit-Stand [place] conditional independence  -CW conditional independence  -EW conditional independence  -CW    Transfers, Stand-Sit [place] conditional independence  -CW conditional independence  -EW conditional independence  -CW    Transfers, Sit-Stand-Sit, Assist Device rolling walker  -CW rolling walker  -EW rolling walker  -CW    Recorded by [CW] Magidel Alcaraz [EW] Mirta Patino, PT [CW] Magdiel Alcaraz    Gait Assessment/Treatment    Gait, [place] Level stand by assist  -CW verbal cues required;stand by assist;contact guard assist  -EW stand by assist  -CW    Gait, Assistive Device rolling walker  -CW rolling walker  -EW rolling walker  -CW    Gait, Distance (Feet) 200  -  -  -CW    Gait, Gait Deviations lissa decreased;step length decreased;stride length decreased  -CW forward flexed posture;lissa decreased  -EW     Recorded by [CW] Magdiel Alcaraz [EW] Mirta Patino, PT [CW] Magdiel Alcaraz    Therapy Exercises    Bilateral Lower Extremities AROM:;15 reps;sitting;ankle pumps/circles;hip flexion;LAQ  -CW      Recorded by [CW] Magdiel Alcaraz      Positioning and Restraints    Pre-Treatment Position in bed  -CW in bed  -EW in bed  -CW    Post Treatment Position bed   -CW bed  -EW bed  -CW    In Bed supine;call light within reach;encouraged to call for assist  -CW supine;notified nsg;call light within reach;encouraged to call for assist  -EW sitting EOB;call light within reach;encouraged to call for assist  -CW    Recorded by [CW] Magdiel Alcaraz [EW] Mirta Patino, PT [CW] Magdiel Alcaraz      User Key  (r) = Recorded By, (t) = Taken By, (c) = Cosigned By    Initials Name Effective Dates    EW Mirta Patino, PT 12/01/15 -     CW Magdiel Alcaraz 12/13/16 -                 IP PT Goals       02/04/17 1521          Bed Mobility PT STG    Bed Mobility PT STG, Date Established 02/04/17  -SV      Bed Mobility PT STG, Time to Achieve 1 wk  -SV      Bed Mobility PT STG, Activity Type all bed mobility  -SV      Bed Mobility PT STG, [place] Level independent  -SV      Transfer Training PT STG    Transfer Training PT STG, Date Established 02/04/17  -SV      Transfer Training PT STG, Time to Achieve 1 wk  -SV      Transfer Training PT STG, Activity Type all transfers  -SV      Transfer Training PT STG, [place] Level independent   least vs no AD  -SV      Gait Training PT STG    Gait Training Goal PT STG, Date Established 02/04/17  -SV      Gait Training Goal PT STG, Time to Achieve 1 wk  -SV      Gait Training Goal PT STG, [place] Level independent  -SV      Gait Training Goal PT STG, Assist Device --   least vs no AD  -SV      Gait Training Goal PT STG, Distance to Achieve 300  -SV        User Key  (r) = Recorded By, (t) = Taken By, (c) = Cosigned By    Initials Name Provider Type    SV Zaria Marvin, PT Physical Therapist          Physical Therapy Education     Title: PT OT SLP Therapies (Done)     Topic: Physical Therapy (Done)     Point: Mobility training (Done)    Learning Progress Summary    Learner Readiness Method Response Comment Documented by Status   Patient Acceptance E,ALEX CUNNINGHAM  CW 02/08/17 1041 Done    Acceptance GUILLAUME GASTON NR  EW 02/07/17 1059 Done     Acceptance RUDDY GASTON VU  CW 02/06/17 0946 Done    Acceptance E,D NR,VU  SV 02/04/17 1520 Done               Point: Home exercise program (Done)    Learning Progress Summary    Learner Readiness Method Response Comment Documented by Status   Patient Acceptance RUDDY GASTON VU  CW 02/08/17 1041 Done    Acceptance E,D VU,NR  EW 02/07/17 1059 Done    Acceptance RUDDY GASTON VU  CW 02/06/17 0946 Done    Acceptance E,D NR,VU  SV 02/04/17 1520 Done                      User Key     Initials Effective Dates Name Provider Type Discipline    EW 12/01/15 -  Mirta Patino, PT Physical Therapist PT     01/17/16 -  Zaria Marvin, PT Physical Therapist PT     12/13/16 -  Magdiel Alcaraz Physical Therapy Assistant PT                    PT Recommendation and Plan  Anticipated Discharge Disposition: skilled nursing facility  Plan of Care Review  Plan Of Care Reviewed With: patient  Progress: progress toward functional goals as expected  Outcome Summary/Follow up Plan: Pt is increasing with activity tolerance and transfer safety          Outcome Measures       02/08/17 1000 02/07/17 1100 02/06/17 0900    How much help from another person do you currently need...    Turning from your back to your side while in flat bed without using bedrails? 4  -CW 4  -EW 4  -CW    Moving from lying on back to sitting on the side of a flat bed without bedrails? 4  -CW 4  -EW 4  -CW    Moving to and from a bed to a chair (including a wheelchair)? 3  -CW 3  -EW 3  -CW    Standing up from a chair using your arms (e.g., wheelchair, bedside chair)? 3  -CW 3  -EW 3  -CW    Climbing 3-5 steps with a railing? 3  -CW 3  -EW 3  -CW    To walk in hospital room? 3  -CW 3  -EW 3  -CW    AM-PAC 6 Clicks Score 20  -CW 20  -EW 20  -CW    Functional Assessment    Outcome Measure Options AM-PAC 6 Clicks Basic Mobility (PT)  -CW AM-PAC 6 Clicks Basic Mobility (PT)  -EW AM-PAC 6 Clicks Basic Mobility (PT)  -CW      User Key  (r) = Recorded By, (t) = Taken By, (c) =  Cosigned By    Initials Name Provider Type    EW Mirta Patino, PT Physical Therapist    CW Magdiel Alcaraz Physical Therapy Assistant           Time Calculation:         PT Charges       02/08/17 1042          Time Calculation    Start Time 1017  -CW      Stop Time 1042  -CW      Time Calculation (min) 25 min  -CW      PT Received On 02/08/17  -CW      PT - Next Appointment 02/09/17  -CW        User Key  (r) = Recorded By, (t) = Taken By, (c) = Cosigned By    Initials Name Provider Type    CW Magdiel Alcaraz Physical Therapy Assistant          Therapy Charges for Today     Code Description Service Date Service Provider Modifiers Qty    07282971836 HC PT THER SUPP EA 15 MIN 2/8/2017 Magdiel Alcaraz GP 2    32182401677 HC PT THER PROC EA 15 MIN 2/8/2017 Magdiel Alcaraz GP 2          PT G-Codes  Outcome Measure Options: AM-PAC 6 Clicks Basic Mobility (PT)    Magdiel Alcaraz  2/8/2017

## 2017-02-08 NOTE — NURSING NOTE
CWOCN follow up on PEG tube stabilizer- skin is healing- it is dry, red, crusted. Recommend to continue applying antifungal around the tube stabilizer, which is intact- there is drainage that is being absorbed by the hydrocolloid. Drainage is controlled. It appears that patient could leave tomorrow- will change the horizontal tube stabilizer device tomorrow for discharge.

## 2017-02-09 VITALS
OXYGEN SATURATION: 93 % | HEIGHT: 70 IN | BODY MASS INDEX: 21.05 KG/M2 | WEIGHT: 147 LBS | HEART RATE: 77 BPM | TEMPERATURE: 98.3 F | SYSTOLIC BLOOD PRESSURE: 150 MMHG | DIASTOLIC BLOOD PRESSURE: 82 MMHG | RESPIRATION RATE: 20 BRPM

## 2017-02-09 LAB
BACTERIA SPEC RESP CULT: NORMAL
GRAM STN SPEC: NORMAL
GRAM STN SPEC: NORMAL

## 2017-02-09 RX ORDER — AMOXICILLIN AND CLAVULANATE POTASSIUM 875; 125 MG/1; MG/1
1 TABLET, FILM COATED ORAL 2 TIMES DAILY
Qty: 14 TABLET | Refills: 0 | Status: SHIPPED | OUTPATIENT
Start: 2017-02-09 | End: 2017-02-16

## 2017-02-09 RX ORDER — HYDROCODONE BITARTRATE AND ACETAMINOPHEN 5; 325 MG/1; MG/1
1 TABLET ORAL EVERY 6 HOURS PRN
Qty: 7 TABLET | Refills: 0 | Status: SHIPPED | OUTPATIENT
Start: 2017-02-09

## 2017-02-09 RX ADMIN — OXYCODONE HYDROCHLORIDE AND ACETAMINOPHEN 500 MG: 500 TABLET ORAL at 08:43

## 2017-02-09 RX ADMIN — CALCIUM CARBONATE 1250 MG: 1250 SUSPENSION ORAL at 08:43

## 2017-02-09 RX ADMIN — ASPIRIN 81 MG: 81 TABLET, CHEWABLE ORAL at 08:43

## 2017-02-09 RX ADMIN — AMOXICILLIN AND CLAVULANATE POTASSIUM 1 TABLET: 875; 125 TABLET, FILM COATED ORAL at 08:43

## 2017-02-09 RX ADMIN — Medication: at 08:43

## 2017-02-09 RX ADMIN — LISINOPRIL 5 MG: 5 TABLET ORAL at 08:43

## 2017-02-09 RX ADMIN — Medication 1 TABLET: at 08:43

## 2017-02-09 RX ADMIN — CALCIUM POLYCARBOPHIL 625 MG: 625 TABLET ORAL at 08:43

## 2017-02-09 NOTE — THERAPY DISCHARGE NOTE
Acute Care - Physical Therapy Treatment Note/Discharge  [facility]     Patient Name: Neville Mendez  : 1950  MRN: [id number]  Today's Date: 2017  Onset of Illness/Injury or Date of Surgery Date: 17  Date of Referral to PT: 17  Referring Physician: Nilo    Admit Date: 2/3/2017    Visit Dx:    ICD-10-CM ICD-9-CM   1. Pneumonia of right lower lobe due to infectious organism J18.9 483.8   2. Bleeding from g-tube R58 459.0   3. Difficulty walking R26.2 719.7   4. Lung mass R91.8 786.6   5. Dementia without behavioral disturbance, unspecified dementia type F03.90 294.20   6. Dysphagia, unspecified type R13.10 787.20   7. DNR (do not resuscitate) Z66 V49.86     Patient Active Problem List   Diagnosis   • Pneumonia of right lower lobe due to infectious organism   • Dementia without behavioral disturbance   • Dysphagia   • DNR (do not resuscitate)       Physical Therapy Education     Title: PT OT SLP Therapies (Resolved)     Topic: Physical Therapy (Resolved)     Point: Mobility training (Resolved)    Learning Progress Summary    Learner Readiness Method Response Comment Documented by Status   Patient Acceptance E VU  PJ 17 1501 Done    Acceptance E,TB DU,VU  CW 17 1041 Done    Acceptance E,D VU,NR  EW 17 1059 Done    Acceptance E,TB DU,VU  CW 17 0946 Done    Acceptance E,D NR,VU   17 1520 Done               Point: Home exercise program (Resolved)    Learning Progress Summary    Learner Readiness Method Response Comment Documented by Status   Patient Acceptance E VU  PJ 17 1501 Done    Acceptance E,TB DU,VU  CW 17 1041 Done    Acceptance E,D VU,NR  EW 17 1059 Done    Acceptance E,TB DU,VU  CW 17 0946 Done    Acceptance E,D NR,VU   17 1520 Done                      User Key     Initials Effective Dates Name Provider Type Discipline    EW 12/01/15 -  Mirta Patino, PT Physical Therapist PT    SV 16 -  Zaria Marvin, PT  Physical Therapist PT    PJ 06/16/16 -  Akosua Echols, RN Registered Nurse Nurse    CW 12/13/16 -  Magdiel Alcaraz Physical Therapy Assistant PT                    IP PT Goals       02/09/17 1028 02/04/17 1521       Bed Mobility PT STG    Bed Mobility PT STG, Date Established  02/04/17  -SV     Bed Mobility PT STG, Time to Achieve  1 wk  -SV     Bed Mobility PT STG, Activity Type  all bed mobility  -SV     Bed Mobility PT STG, [place] Level  independent  -SV     Bed Mobility PT STG, Outcome goal not met  -CW      Bed Mobility PT STG, Reason Goal Not Met discharged from facility  -CW      Transfer Training PT STG    Transfer Training PT STG, Date Established  02/04/17  -SV     Transfer Training PT STG, Time to Achieve  1 wk  -SV     Transfer Training PT STG, Activity Type  all transfers  -SV     Transfer Training PT STG, [place] Level  independent   least vs no AD  -SV     Transfer Training PT STG, Outcome goal not met  -CW      Transfer Training PT STG, Reason Goal Not Met discharged from facility  -CW      Gait Training PT STG    Gait Training Goal PT STG, Date Established  02/04/17  -SV     Gait Training Goal PT STG, Time to Achieve  1 wk  -SV     Gait Training Goal PT STG, [place] Level  independent  -SV     Gait Training Goal PT STG, Assist Device  --   least vs no AD  -SV     Gait Training Goal PT STG, Distance to Achieve  300  -SV     Gait Training Goal PT STG, Outcome goal not met  -CW      Gait Training Goal PT STG, Reason Goal Not Met discharged from facility  -        User Key  (r) = Recorded By, (t) = Taken By, (c) = Cosigned By    Initials Name Provider Type    SV Zaria Marvin, PT Physical Therapist    CW Magdiel Alcaraz Physical Therapy Assistant              Adult Rehabilitation Note       02/08/17 1000 02/07/17 1048       Rehab Assessment/Intervention    Discipline physical therapy assistant  -CW physical therapist  -EW     Document Type therapy note (daily note)  -CW therapy  note (daily note)  -EW     Subjective Information agree to therapy;complains of;fatigue;pain  -CW agree to therapy  -EW     Patient Effort, Rehab Treatment good  -CW good  -EW     Precautions/Limitations fall precautions  -CW fall precautions  -EW     Recorded by [CW] Magdiel Alcaraz [EW] Mirta Patino, PT     Pain Assessment    Pain Assessment 0-10  -CW No/denies pain  -EW     Pain Score 4  -CW      Pain Location Abdomen  -CW      Recorded by [CW] Magdiel Alcaraz [EW] Mirta Patino, PT     Cognitive Assessment/Intervention    Current Cognitive/Communication Assessment functional  -CW      Orientation Status oriented x 4  -CW      Follows Commands/Answers Questions 100% of the time  -CW      Personal Safety WNL/WFL  -CW      Personal Safety Interventions fall prevention program maintained;gait belt;nonskid shoes/slippers when out of bed  -CW      Recorded by [CW] Magdiel Alcaraz      Bed Mobility, Assessment/Treatment    Bed Mob, Supine to Sit, [place] conditional independence  -CW conditional independence  -EW     Bed Mob, Sit to Supine, [place] conditional independence  -CW conditional independence  -EW     Recorded by [CW] Magdiel Alcaraz [EW] Mirta Patino, PT     Transfer Assessment/Treatment    Transfers, Sit-Stand [place] conditional independence  -CW conditional independence  -EW     Transfers, Stand-Sit [place] conditional independence  -CW conditional independence  -EW     Transfers, Sit-Stand-Sit, Assist Device rolling walker  -CW rolling walker  -EW     Recorded by [CW] Magdiel Alcaraz [EW] Mirta Patino, PT     Gait Assessment/Treatment    Gait, [place] Level stand by assist  -CW verbal cues required;stand by assist;contact guard assist  -EW     Gait, Assistive Device rolling walker  -CW rolling walker  -EW     Gait, Distance (Feet) 200  -  -EW     Gait, Gait Deviations lissa decreased;step length decreased;stride length decreased  -CW forward  flexed posture;lissa decreased  -EW     Recorded by [CW] Magdiel Alcaraz [EW] Mirta Patino PT     Therapy Exercises    Bilateral Lower Extremities AROM:;15 reps;sitting;ankle pumps/circles;hip flexion;LAQ  -CW      Recorded by [CW] Magdiel Alcaraz      Positioning and Restraints    Pre-Treatment Position in bed  -CW in bed  -EW     Post Treatment Position bed  -CW bed  -EW     In Bed supine;call light within reach;encouraged to call for assist  -CW supine;notified nsg;call light within reach;encouraged to call for assist  -EW     Recorded by [CW] Magdiel Alcaraz [EW] Mirta Patino PT       User Key  (r) = Recorded By, (t) = Taken By, (c) = Cosigned By    Initials Name Effective Dates    EW Mirta Patino, PT 12/01/15 -     CW Magdiel Alcaraz 12/13/16 -           PT Recommendation and Plan  Anticipated Discharge Disposition: skilled nursing facility  Plan of Care Review  Plan Of Care Reviewed With: patient  Progress: progress toward functional goals as expected  Outcome Summary/Follow up Plan: Pt is increasing with activity tolerance and transfer safety          Outcome Measures       02/08/17 1000 02/07/17 1100       How much help from another person do you currently need...    Turning from your back to your side while in flat bed without using bedrails? 4  -CW 4  -EW     Moving from lying on back to sitting on the side of a flat bed without bedrails? 4  -CW 4  -EW     Moving to and from a bed to a chair (including a wheelchair)? 3  -CW 3  -EW     Standing up from a chair using your arms (e.g., wheelchair, bedside chair)? 3  -CW 3  -EW     Climbing 3-5 steps with a railing? 3  -CW 3  -EW     To walk in hospital room? 3  -CW 3  -EW     AM-PAC 6 Clicks Score 20  -CW 20  -EW     Functional Assessment    Outcome Measure Options AM-PAC 6 Clicks Basic Mobility (PT)  -CW AM-PAC 6 Clicks Basic Mobility (PT)  -EW       User Key  (r) = Recorded By, (t) = Taken By, (c) = Cosigned By    Initials Name  Provider Type    EW Mirta Patino, PT Physical Therapist    CW Magdiel Alcaraz Physical Therapy Assistant           Time Calculation:       Therapy Charges for Today     Code Description Service Date Service Provider Modifiers Qty    55470851364 HC PT THER SUPP EA 15 MIN 2/8/2017 Magdiel Alcaraz GP 2    82969315761 HC PT THER PROC EA 15 MIN 2/8/2017 Magdiel Alcaraz GP 2          PT G-Codes  Outcome Measure Options: AM-PAC 6 Clicks Basic Mobility (PT)    PT Discharge Summary  Reason for Discharge: Discharge from facility  Outcomes Achieved: Refer to plan of care for updates on goals achieved  Discharge Destination: SNF    Magdiel Alcaraz  2/9/2017

## 2017-02-09 NOTE — DISCHARGE SUMMARY
Date of Admission: 2/3/2017  Date of Discharge:  2/9/2017  Primary Care Physician: No Known Provider     Discharge Diagnosis:  Active Hospital Problems (** Indicates Principal Problem)    Diagnosis Date Noted   • **Pneumonia of right lower lobe due to infectious organism [J18.9] 02/03/2017   • DNR (do not resuscitate) [Z66] 02/08/2017   • Dementia without behavioral disturbance [F03.90] 02/06/2017   • Dysphagia [R13.10] 02/06/2017      Resolved Hospital Problems    Diagnosis Date Noted Date Resolved   No resolved problems to display.       Presenting Problem/History of Present Illness  Bleeding [R58]  Pneumonia of right lower lobe due to infectious organism [J18.9]   Patient is a 66 y.o. male with a history of head and neck cancer s/p surgical excision and chemotherapy and G tube placement who presents from [facility] stating that he wanted his g tube to be taken out. Patient is a poor historian when it comes to his medical history but according to the ED staff he was sent here because they noticed some bleeding around his G tube site. Patient states he cannot remember who or where or why he had the G tube placed. He states that he eats a pureed diet at the NH and gets nocturnal tube feedings. He states that the tube is bothering him and he wants it taken out. He denies any dysphagia. He does have minimal cough. No fevers or chills. States he used to be on thickened liquids but was told he could do a pureed diet. He was found to have RLL pneumonia in the ED    Physical Exam (Day of DC)  Gen AA NAD  HEENT EOMI PERRL  CV RRR  Lung RLL rales and rhonchi, no wheezes or distress  Abd NDNT PEG in place  Ext no cyanosis or edema  Neuro CN2-12 intact, Orriented to person and place    Hospital Course  Patient is a 66 y.o. male who presented with peristomal inflammation of his PEG tube and RLL mass vs infiltrate on imaging. He was admitted and started on Zosyn and Vancomycin for PNA coverage. Pulmonology was  consulted because of CT findings with possible  vs metastatic process and lung mass. Gi was consulted and evaluated his PEG tube. Wound care was consulted as well to aid in his healing. Pt had SLP evaluation and has moderate to severe aspiration risk so he was made NPO. Palliative medicine was consulted to facilitate GOC discussion and code status because Mr Mendez wished to eat and also did not want to proceed with diagnostic workup or treatment of possible cancer. In accordance with his and family wished his code status was changed to DNAR/Conditional Code. Plans were made for transfer to [facility] and hospice evaluation however patient improved to the point that discharge back to SNF and outpatient hospice evaluation is possible. He wishes to be discharged.    He will be transitioned to Augmentin to complete Abx course for likely aspiration PNA and will continue his TF and NPO status for now. I have discussed risk for aspiration and PNA with the patient with PO intake. Should he wish to resume his oral feeding, then would advise discussion again with patient and family for transfer to hospice and plan for no additional antibiotics. He is DNAR/Conditional Code and outpatient hospice referral has been placed.    Procedures Performed:         Consults:   Consults     Date and Time Order Name Status Description    2/5/2017 1249 Inpatient Consult to Pulmonology      2/3/2017 1927 Inpatient Consult to Gastroenterology Completed     2/3/2017 1718 [facility] (on-call MD unless specified) Completed              Condition on Discharge: Stable    Discharge Disposition  Skilled Nursing Facility (DC - External)    Discharge Medications:   Neville Mendez   Home Medication Instructions KIKE:[id number]    Printed on:02/09/17 0957   Medication Information                      albuterol (PROVENTIL HFA;VENTOLIN HFA) 108 (90 BASE) MCG/ACT inhaler  Inhale 2 puffs Every 6 (Six) Hours.             albuterol (PROVENTIL HFA;VENTOLIN HFA) 108  (90 BASE) MCG/ACT inhaler  Inhale 2 puffs Every 4 (Four) Hours As Needed for wheezing.             albuterol (PROVENTIL) (2.5 MG/3ML) 0.083% nebulizer solution  Take 2.5 mg by nebulization 3 (Three) Times a Day.             albuterol (PROVENTIL) (2.5 MG/3ML) 0.083% nebulizer solution  Take 2.5 mg by nebulization Every 4 (Four) Hours As Needed for wheezing (with spacer).             amoxicillin-clavulanate (AUGMENTIN) 875-125 MG per tablet  1 tablet by Per G Tube route 2 (Two) Times a Day for 7 days. Indications: Pneumonia             Ascorbic Acid (VITAMIN C) 500 MG capsule  1 tablet by Per G Tube route Daily.             aspirin 325 MG tablet  Take 81 mg by mouth.             aspirin 81 MG chewable tablet  81 mg by Per G Tube route Daily.             clobetasol (TEMOVATE) 0.05 % ointment  Apply  topically Daily. Apply to L upper thigh daily til healed             Emollient (AQUAPHOR ADVANCED THERAPY) ointment  Apply 1 application topically. Every shift to front of neck and throat             guaiFENesin (ROBITUSSIN) 100 MG/5ML solution oral solution  200 mg by Per G Tube route Every 6 (Six) Hours As Needed.             HYDROcodone-acetaminophen (NORCO) 5-325 MG per tablet  Take 1 tablet by mouth Every 6 (Six) Hours As Needed for moderate pain (4-6) or severe pain (7-10).             ibuprofen (ADVIL,MOTRIN) 600 MG tablet  600 mg by Per G Tube route 3 (Three) Times a Day.             lisinopril (PRINIVIL,ZESTRIL) 5 MG tablet  5 mg by Per G Tube route.             mirtazapine (REMERON) 7.5 MG half tablet  7.5 mg by Per G Tube route Every Night.             Multiple Vitamin (TAB-A-JONN PO)  1 tablet by Per G Tube route Daily.             mupirocin (BACTROBAN) 2 % ointment  Apply  topically 2 (Two) Times a Day. Cleanse peg with normal saline and peroxide mixture apply oint topically & cover as directed two times daily             Nutritional Supplements (JEVITY 1.2 ANDERSON) liquid  by Per G Tube route. 85 cc per hr from  7p-7a             Nutritional Supplements (PROMOD) liquid  30 mL by Per G Tube route 3 (Three) Times a Day.             oyster shell calcium 500 MG tablet tablet  500 mg by Per G Tube route 2 (Two) Times a Day.             polycarbophil (FIBER LAXATIVE) 625 MG tablet  625 mg by Per G Tube route 2 (Two) Times a Day.             potassium chloride (KAYCIEL) 20 MEQ/15ML (10%) solution  40 mEq by Per G Tube route Daily.             promethazine (PHENERGAN) 25 MG tablet  Take 25 mg by mouth.             Sodium Fluoride (PREVIDENT 5000 BOOSTER) 1.1 % paste  Apply  to teeth every night at bedtime.             Umeclidinium Bromide (INCRUSE ELLIPTA) 62.5 MCG/INH aerosol powder   Inhale 1 puff Daily.             Unable to find  1 each 1 (One) Time. H2O flush per GT with 200 cc water every 4 hours             Unable to find  Apply 1 each topically 1 (One) Time. House barrier cream to upper chest and neck area avoiding stoma             zolpidem (AMBIEN) 5 MG tablet  Take 2.5 mg by mouth Daily.                 Discharge Diet:   Diet Instructions     Diet: Tube Feeding; Continuous; Jevity 1.2, Goal rate 85, 7PM to 7AM. See nutrition recommendations       Discharge Diet:  Tube Feeding   Feeding Type:  Continuous   Formula & Rate:  Jevity 1.2, Goal rate 85, 7PM to 7AM. See nutrition recommendations                 Activity at Discharge:   Activity Instructions     Activity as Tolerated                     Follow-up Appointments:  No future appointments.  Referrals and Follow-ups to Schedule     Ambulatory Referral to Home Hospice    As directed    Please evaluate Neville Mendez for admission to Hospice.    Patient/Family aware of referral: yes    Services to provide: Evaluate    Physician to follow patient's care (the person listed here will be responsible for signing ongoing orders): PCP    Referring Provider Call-back Phone Number: PCP    Requested Start of Care Date: Within 2-3 days    Special Instructions: Pt completing ABx  course for PNA and has TF orders per nutrition       Follow-Up    As directed    Per protocol   Follow Up Details:  with facility md                 Test Results Pending at Discharge   Order Current Status    Respiratory Culture Preliminary result           Boaz Estrella MD  02/09/17  9:57 AM    Time Spent on Discharge Activities: >30min

## 2017-02-09 NOTE — PROGRESS NOTES
Continued Stay Note  [facility]     Patient Name: Neville Mendez  MRN: [id number]  Today's Date: 2/9/2017    Admit Date: 2/3/2017          Discharge Plan       02/09/17 1014    Case Management/Social Work Plan    Plan Return to [facility]    Additional Comments Spoke with Yas Henao-sister/POA at [phone] to inform of discharge today and [facility] scheduled for 1pm today and she verbalized that MD had already informed of discharge.  Anh with [facility] informed of patient's d/c today and ambulance time.  Informed ADITHYA Rogers of ambulance scheduled time of 1pm and packet with report number given  to her.....Fabiola Sharma RN,CCP               Discharge Codes     None        Expected Discharge Date and Time     Expected Discharge Date Expected Discharge Time    Feb 9, 2017             Fabiola Sharma RN

## 2017-02-09 NOTE — NURSING NOTE
Change of tube stabilizer to PEG.  Skin is reddened but shows evidence of improvement. Pt reports tenderness improved. Skin crusted with stomahesive powder and no sting spray. New stabilizer applied.  When complete, EMS was here to take pt back to nursing home. Tolerated well.

## 2017-02-09 NOTE — PLAN OF CARE
Problem: Inpatient Physical Therapy  Goal: Bed Mobility Goal STG- PT    02/09/17 1028   Bed Mobility PT STG   Bed Mobility PT STG, Outcome goal not met   Bed Mobility PT STG, Reason Goal Not Met discharged from facility       Goal: Transfer Training Goal 1 STG- PT  Outcome: Ongoing (interventions implemented as appropriate)    02/09/17 1028   Transfer Training PT STG   Transfer Training PT STG, Outcome goal not met   Transfer Training PT STG, Reason Goal Not Met discharged from facility       Goal: Gait Training Goal STG- PT  Outcome: Ongoing (interventions implemented as appropriate)    02/09/17 1028   Gait Training PT STG   Gait Training Goal PT STG, Outcome goal not met   Gait Training Goal PT STG, Reason Goal Not Met discharged from facility

## 2017-02-09 NOTE — PLAN OF CARE
Problem: Patient Care Overview (Adult)  Goal: Plan of Care Review  Outcome: Ongoing (interventions implemented as appropriate)    02/09/17 0412   Coping/Psychosocial Response Interventions   Plan Of Care Reviewed With patient   Patient Care Overview   Progress no change   Outcome Evaluation   Outcome Summary/Follow up Plan pt has new order for palliative care transfer; sister called tonight and would like to talk to doctor before he is sent over to [facility]; TF started 7p-7a; VSS         Problem: Fall Risk (Adult)  Goal: Absence of Falls  Outcome: Ongoing (interventions implemented as appropriate)    Problem: Pneumonia (Adult)  Goal: Signs and Symptoms of Listed Potential Problems Will be Absent or Manageable (Pneumonia)  Outcome: Outcome(s) achieved Date Met:  02/09/17    Problem: Nutrition, Enteral (Adult)  Goal: Signs and Symptoms of Listed Potential Problems Will be Absent or Manageable (Nutrition, Enteral)  Outcome: Ongoing (interventions implemented as appropriate)

## 2017-02-09 NOTE — PROGRESS NOTES
Continued Stay Note  [facility]     Patient Name: Neville Mendez  MRN: [id number]  Today's Date: 2/9/2017    Admit Date: 2/3/2017          Discharge Plan       02/09/17 1338    Case Management/Social Work Plan    Plan [facility]    Final Note    Final Note Discharged 2/9/17  to [facility] level              Discharge Codes       02/09/17 1338    Discharge Codes    Discharge Codes 04  Discharged/transferred to intermediate care facility (ICF)        Expected Discharge Date and Time     Expected Discharge Date Expected Discharge Time    Feb 9, 2017             Fabiola Sharma RN

## 2017-02-09 NOTE — PROGRESS NOTES
Dr. JACOBO Singletary    [address]    2/9/2017    Patient ID:  Name:  Neville Mendez  MRN:  [id number]  1950  66 y.o.  male            CC/Reason for visit: Follow-up for masslike airspace infiltrates in the right hilar area and mediastinal adenopathy.    HPI: He has some cough.  Denies fever, chills or sputum The patient does not want bronchoscopy with biopsy at this time.  He adamantly declines.  He wants to revisit this again in 3-4 weeks, after antibiotic treatment to see if this was pneumonia, and if it has not resolved on CT scan in 1 month, he is willing to discuss biopsy at that time.    Vitals:  Vitals:    02/08/17 0800 02/08/17 2053 02/08/17 2326 02/09/17 0833   BP: 143/87 131/60 122/59 150/82   BP Location: Left arm Right arm Right arm Left arm   Patient Position: Lying Lying Lying Sitting   Pulse: 75 63 67 77   Resp: 18 20 18 20   Temp:  97.7 °F (36.5 °C) 98.2 °F (36.8 °C) 98.3 °F (36.8 °C)   TempSrc:  Oral Oral Oral   SpO2: 93% 96% 98% 93%   Weight:       Height:               Body mass index is 21.09 kg/(m^2).    Intake/Output Summary (Last 24 hours) at 02/09/17 1123  Last data filed at 02/08/17 2255   Gross per 24 hour   Intake    200 ml   Output    400 ml   Net   -200 ml       Exam:  GEN:  No distress, appears stated age  LUNGS: Scattered coarse breath sounds and diminished breath sounds, nonlabored breathing  CV:  Normal S1S2, without murmur, no edema  ABD:  Non tender, no enlarged liver or masses  EXT:  No cyanosis or clubbing    Scheduled meds:    amoxicillin-clavulanate 1 tablet Oral Q12H   aspirin 81 mg Per G Tube Daily   calcium carbonate 1,250 mg Per G Tube BID With Meals   lisinopril 5 mg Per G Tube Q24H   miconazole nitrate  Topical Q12H   mirtazapine 7.5 mg Per G Tube Nightly   multivitamin 1 tablet Per G Tube Daily   polycarbophil 625 mg Per G Tube BID   vitamin C 500 mg Per G Tube Daily     IV meds:                        dextrose 5 % and sodium chloride 0.9 % 75 mL/hr  Last Rate: 75 mL/hr (02/08/17 3569)       Data Review:   I reviewed the patient's medications and new clinical results.  Lab Results   Component Value Date    CALCIUM 8.3 (L) 02/08/2017     Results from last 7 days  Lab Units 02/08/17  0336 02/07/17  1751 02/07/17  0452 02/06/17  0545  02/03/17  1651   AST (SGOT) U/L  --   --   --   --   --  16   MAGNESIUM mg/dL 1.5*  --   --   --   --   --    SODIUM mmol/L 140  --  138 136  < > 132*   POTASSIUM mmol/L 3.8 4.3 3.4* 3.5  < > 3.9   CHLORIDE mmol/L 102  --  100 100  < > 89*   TOTAL CO2 mmol/L 26.2  --  27.8 25.0  < > 32.5*   BUN mg/dL 8  --  7* 6*  < > 13   CREATININE mg/dL 0.61*  --  0.61* 0.61*  < > 0.61*   GLUCOSE mg/dL 139*  --  138* 166*  < > 135*   CALCIUM mg/dL 8.3*  --  8.0* 8.3*  < > 9.7   WBC 10*3/mm3 9.02  --  8.96 11.33*  < > 11.78*   HEMOGLOBIN g/dL 8.9*  --  9.0* 9.4*  < > 11.1*   PLATELETS 10*3/mm3 389  --  365 419  < > 558*   ALT (SGPT) U/L  --   --   --   --   --  13   < > = values in this interval not displayed.          ASSESSMENT:   Mediastinal adenopathy    Pneumonia of right lower lobe due to infectious organism  Active Problems:    Dementia without behavioral disturbance    Dysphagia    DNR (do not resuscitate)      PLAN:  The patient does not want bronchoscopy with biopsy at this time.  He adamantly declines.  He wants to revisit this again in 3-4 weeks, after antibiotic treatment to see if this was pneumonia, and if it has not resolved on CT scan in 1 month, he is willing to discuss biopsy at that time.    Connor Singletary MD  2/9/2017

## 2017-02-09 NOTE — PLAN OF CARE
Problem: Patient Care Overview (Adult)  Goal: Plan of Care Review  Outcome: Ongoing (interventions implemented as appropriate)    02/09/17 0412 02/09/17 1046   Coping/Psychosocial Response Interventions   Plan Of Care Reviewed With --  patient   Patient Care Overview   Progress no change --        Goal: Adult Individualization and Mutuality  Outcome: Ongoing (interventions implemented as appropriate)  Goal: Discharge Needs Assessment  Outcome: Ongoing (interventions implemented as appropriate)    Problem: Fall Risk (Adult)  Goal: Absence of Falls  Outcome: Ongoing (interventions implemented as appropriate)    Problem: Nutrition, Enteral (Adult)  Goal: Signs and Symptoms of Listed Potential Problems Will be Absent or Manageable (Nutrition, Enteral)  Outcome: Outcome(s) achieved Date Met:  02/09/17

## 2017-02-09 NOTE — PAYOR COMM NOTE
"Neville Mendez (66 y.o. Male)                 ATTENTION; MARYANN BLOCK LPN, DISCHARGE SUMMARY FOR YOUR REVIEW. BEBE FARFAN N          Date of Birth Social Security Number Address Home Phone MRN    [id number]  [id number] [facility] [phone] [id number]    Zoroastrian Marital Status          None        Admission Date Admission Type Admitting Provider Attending Provider Department, Room/Bed    2/3/17 Emergency Willian Burgess MD  [address], 414/1    Discharge Date Discharge Disposition Discharge Destination        2/9/2017 Skilled Nursing Facility (DC - External)             Attending Provider: (none)    Allergies:  No Known Allergies    Isolation:  None   Infection:  None   Code Status:  Conditional    Ht:  70\" (177.8 cm)   Wt:  147 lb (66.7 kg)    Admission Cmt:  None   Principal Problem:  Pneumonia of right lower lobe due to infectious organism [J18.9]                 Active Insurance as of 2/3/2017     Primary Coverage     Payor Plan Insurance Group Employer/Plan Group    [facility]      Payor Plan Address Payor Plan Phone Number Effective From Effective To    [address] [address] [phone] 1/20/2017     [place] [address]       Subscriber Name Subscriber Birth Date Member ID       NEVILLE MENDEZ 1950 [id number]                 Emergency Contacts      (Rel.) Home Phone Work Phone Mobile Phone    Yas Henao (Sister) [phone] -- [phone]               Discharge Summary      Boaz Estrella MD at 2/9/2017  9:57 AM          Date of Admission: 2/3/2017  Date of Discharge:  2/9/2017  Primary Care Physician: No Known Provider     Discharge Diagnosis:  Active Hospital Problems (** Indicates Principal Problem)    Diagnosis Date Noted   • **Pneumonia of right lower lobe due to infectious organism [J18.9] 02/03/2017   • DNR (do not resuscitate) [Z66] 02/08/2017   • Dementia without behavioral " disturbance [F03.90] 02/06/2017   • Dysphagia [R13.10] 02/06/2017      Resolved Hospital Problems    Diagnosis Date Noted Date Resolved   No resolved problems to display.       Presenting Problem/History of Present Illness  Bleeding [R58]  Pneumonia of right lower lobe due to infectious organism [J18.9]   Patient is a 66 y.o. male with a history of head and neck cancer s/p surgical excision and chemotherapy and G tube placement who presents from [facility] stating that he wanted his g tube to be taken out. Patient is a poor historian when it comes to his medical history but according to the ED staff he was sent here because they noticed some bleeding around his G tube site. Patient states he cannot remember who or where or why he had the G tube placed. He states that he eats a pureed diet at the NH and gets nocturnal tube feedings. He states that the tube is bothering him and he wants it taken out. He denies any dysphagia. He does have minimal cough. No fevers or chills. States he used to be on thickened liquids but was told he could do a pureed diet. He was found to have RLL pneumonia in the ED    Physical Exam (Day of DC)  Gen AA NAD  HEENT EOMI PERRL  CV RRR  Lung RLL rales and rhonchi, no wheezes or distress  Abd NDNT PEG in place  Ext no cyanosis or edema  Neuro CN2-12 intact, Orriented to person and place    Hospital Course  Patient is a 66 y.o. male who presented with peristomal inflammation of his PEG tube and RLL mass vs infiltrate on imaging. He was admitted and started on Zosyn and Vancomycin for PNA coverage. Pulmonology was consulted because of CT findings with possible  vs metastatic process and lung mass. Gi was consulted and evaluated his PEG tube. Wound care was consulted as well to aid in his healing. Pt had SLP evaluation and has moderate to severe aspiration risk so he was made NPO. Palliative medicine was consulted to facilitate GOC discussion and code status because  Vanessa  wished to eat and also did not want to proceed with diagnostic workup or treatment of possible cancer. In accordance with his and family wished his code status was changed to DNAR/Conditional Code. Plans were made for transfer to [facility] and hospice evaluation however patient improved to the point that discharge back to SNF and outpatient hospice evaluation is possible. He wishes to be discharged.    He will be transitioned to Augmentin to complete Abx course for likely aspiration PNA and will continue his TF and NPO status for now. I have discussed risk for aspiration and PNA with the patient with PO intake. Should he wish to resume his oral feeding, then would advise discussion again with patient and family for transfer to hospice and plan for no additional antibiotics. He is DNAR/Conditional Code and outpatient hospice referral has been placed.    Procedures Performed:         Consults:   Consults     Date and Time Order Name Status Description    2/5/2017 1249 Inpatient Consult to Pulmonology      2/3/2017 1927 Inpatient Consult to Gastroenterology Completed     2/3/2017 1718 [facility] (on-call MD unless specified) Completed              Condition on Discharge: Stable    Discharge Disposition  Skilled Nursing Facility (DC - External)    Discharge Medications:   Neville Mendez   Home Medication Instructions KIKE:[id number]    Printed on:02/09/17 0957   Medication Information                      albuterol (PROVENTIL HFA;VENTOLIN HFA) 108 (90 BASE) MCG/ACT inhaler  Inhale 2 puffs Every 6 (Six) Hours.             albuterol (PROVENTIL HFA;VENTOLIN HFA) 108 (90 BASE) MCG/ACT inhaler  Inhale 2 puffs Every 4 (Four) Hours As Needed for wheezing.             albuterol (PROVENTIL) (2.5 MG/3ML) 0.083% nebulizer solution  Take 2.5 mg by nebulization 3 (Three) Times a Day.             albuterol (PROVENTIL) (2.5 MG/3ML) 0.083% nebulizer solution  Take 2.5 mg by nebulization Every 4 (Four) Hours As Needed for wheezing (with  spacer).             amoxicillin-clavulanate (AUGMENTIN) 875-125 MG per tablet  1 tablet by Per G Tube route 2 (Two) Times a Day for 7 days. Indications: Pneumonia             Ascorbic Acid (VITAMIN C) 500 MG capsule  1 tablet by Per G Tube route Daily.             aspirin 325 MG tablet  Take 81 mg by mouth.             aspirin 81 MG chewable tablet  81 mg by Per G Tube route Daily.             clobetasol (TEMOVATE) 0.05 % ointment  Apply  topically Daily. Apply to L upper thigh daily til healed             Emollient (AQUAPHOR ADVANCED THERAPY) ointment  Apply 1 application topically. Every shift to front of neck and throat             guaiFENesin (ROBITUSSIN) 100 MG/5ML solution oral solution  200 mg by Per G Tube route Every 6 (Six) Hours As Needed.             HYDROcodone-acetaminophen (NORCO) 5-325 MG per tablet  Take 1 tablet by mouth Every 6 (Six) Hours As Needed for moderate pain (4-6) or severe pain (7-10).             ibuprofen (ADVIL,MOTRIN) 600 MG tablet  600 mg by Per G Tube route 3 (Three) Times a Day.             lisinopril (PRINIVIL,ZESTRIL) 5 MG tablet  5 mg by Per G Tube route.             mirtazapine (REMERON) 7.5 MG half tablet  7.5 mg by Per G Tube route Every Night.             Multiple Vitamin (TAB-A-JONN PO)  1 tablet by Per G Tube route Daily.             mupirocin (BACTROBAN) 2 % ointment  Apply  topically 2 (Two) Times a Day. Cleanse peg with normal saline and peroxide mixture apply oint topically & cover as directed two times daily             Nutritional Supplements (JEVITY 1.2 ANDERSON) liquid  by Per G Tube route. 85 cc per hr from 7p-7a             Nutritional Supplements (PROMOD) liquid  30 mL by Per G Tube route 3 (Three) Times a Day.             oyster shell calcium 500 MG tablet tablet  500 mg by Per G Tube route 2 (Two) Times a Day.             polycarbophil (FIBER LAXATIVE) 625 MG tablet  625 mg by Per G Tube route 2 (Two) Times a Day.             potassium chloride (KAYCIEL) 20  MEQ/15ML (10%) solution  40 mEq by Per G Tube route Daily.             promethazine (PHENERGAN) 25 MG tablet  Take 25 mg by mouth.             Sodium Fluoride (PREVIDENT 5000 BOOSTER) 1.1 % paste  Apply  to teeth every night at bedtime.             Umeclidinium Bromide (INCRUSE ELLIPTA) 62.5 MCG/INH aerosol powder   Inhale 1 puff Daily.             Unable to find  1 each 1 (One) Time. H2O flush per GT with 200 cc water every 4 hours             Unable to find  Apply 1 each topically 1 (One) Time. House barrier cream to upper chest and neck area avoiding stoma             zolpidem (AMBIEN) 5 MG tablet  Take 2.5 mg by mouth Daily.                 Discharge Diet:   Diet Instructions     Diet: Tube Feeding; Continuous; Jevity 1.2, Goal rate 85, 7PM to 7AM. See nutrition recommendations       Discharge Diet:  Tube Feeding   Feeding Type:  Continuous   Formula & Rate:  Jevity 1.2, Goal rate 85, 7PM to 7AM. See nutrition recommendations                 Activity at Discharge:   Activity Instructions     Activity as Tolerated                     Follow-up Appointments:  No future appointments.  Referrals and Follow-ups to Schedule     Ambulatory Referral to Home Hospice    As directed    Please evaluate Neville Mendez for admission to Hospice.    Patient/Family aware of referral: yes    Services to provide: Evaluate    Physician to follow patient's care (the person listed here will be responsible for signing ongoing orders): PCP    Referring Provider Call-back Phone Number: PCP    Requested Start of Care Date: Within 2-3 days    Special Instructions: Pt completing ABx course for PNA and has TF orders per nutrition       Follow-Up    As directed    Per protocol   Follow Up Details:  with facility md                 Test Results Pending at Discharge   Order Current Status    Respiratory Culture Preliminary result           Boaz Estrella MD  02/09/17  9:57 AM    Time Spent on Discharge Activities: >30min            Electronically signed by Boaz Estrella MD at 2/9/2017 10:08 AM

## 2017-02-15 ENCOUNTER — TRANSCRIBE ORDERS (OUTPATIENT)
Dept: ADMINISTRATIVE | Facility: HOSPITAL | Age: 67
End: 2017-02-15

## 2017-02-15 DIAGNOSIS — R91.8 MASS OF MIDDLE LOBE OF RIGHT LUNG: Primary | ICD-10-CM

## 2017-02-15 DIAGNOSIS — R91.8 MASS OF LOWER LOBE OF RIGHT LUNG: ICD-10-CM

## 2017-02-27 ENCOUNTER — HOSPITAL ENCOUNTER (OUTPATIENT)
Dept: CT IMAGING | Facility: HOSPITAL | Age: 67
Discharge: HOME | End: 2017-02-27
Admitting: INTERNAL MEDICINE

## 2017-02-27 VITALS
OXYGEN SATURATION: 98 % | TEMPERATURE: 97.6 F | SYSTOLIC BLOOD PRESSURE: 139 MMHG | BODY MASS INDEX: 19.85 KG/M2 | DIASTOLIC BLOOD PRESSURE: 77 MMHG | RESPIRATION RATE: 16 BRPM | HEART RATE: 79 BPM | WEIGHT: 134 LBS | HEIGHT: 69 IN

## 2017-02-27 DIAGNOSIS — R91.8 MASS OF LOWER LOBE OF RIGHT LUNG: ICD-10-CM

## 2017-02-27 DIAGNOSIS — R91.8 MASS OF MIDDLE LOBE OF RIGHT LUNG: ICD-10-CM

## 2017-02-27 LAB
CREAT BLDA-MCNC: 0.6 MG/DL (ref 0.6–1.3)
INR PPP: 1.1 (ref 0.8–1.2)
PROTHROMBIN TIME: 13.5 SECONDS (ref 12.8–15.2)

## 2017-02-27 PROCEDURE — 0 IOPAMIDOL 61 % SOLUTION: Performed by: INTERNAL MEDICINE

## 2017-02-27 PROCEDURE — 71260 CT THORAX DX C+: CPT

## 2017-02-27 PROCEDURE — 82565 ASSAY OF CREATININE: CPT

## 2017-02-27 RX ADMIN — IOPAMIDOL 75 ML: 612 INJECTION, SOLUTION INTRAVENOUS at 08:55

## 2017-03-10 ENCOUNTER — TRANSCRIBE ORDERS (OUTPATIENT)
Dept: ADMINISTRATIVE | Facility: HOSPITAL | Age: 67
End: 2017-03-10

## 2017-03-10 DIAGNOSIS — R91.1 LUNG NODULE: Primary | ICD-10-CM

## 2017-06-07 ENCOUNTER — APPOINTMENT (OUTPATIENT)
Dept: CT IMAGING | Facility: HOSPITAL | Age: 67
End: 2017-06-07
Attending: INTERNAL MEDICINE

## 2017-09-18 ENCOUNTER — HOSPITAL ENCOUNTER (EMERGENCY)
Facility: HOSPITAL | Age: 67
Discharge: HOME | End: 2017-09-19
Attending: EMERGENCY MEDICINE | Admitting: EMERGENCY MEDICINE

## 2017-09-18 ENCOUNTER — APPOINTMENT (OUTPATIENT)
Dept: GENERAL RADIOLOGY | Facility: HOSPITAL | Age: 67
End: 2017-09-18

## 2017-09-18 VITALS
DIASTOLIC BLOOD PRESSURE: 75 MMHG | RESPIRATION RATE: 16 BRPM | WEIGHT: 140 LBS | OXYGEN SATURATION: 95 % | TEMPERATURE: 98.8 F | HEART RATE: 98 BPM | BODY MASS INDEX: 20.04 KG/M2 | HEIGHT: 70 IN | SYSTOLIC BLOOD PRESSURE: 164 MMHG

## 2017-09-18 DIAGNOSIS — R23.8 SKIN IRRITATION: ICD-10-CM

## 2017-09-18 DIAGNOSIS — K94.23 GASTROSTOMY TUBE DYSFUNCTION (HCC): Primary | ICD-10-CM

## 2017-09-18 PROCEDURE — 74000 HC ABDOMEN KUB: CPT

## 2017-09-18 PROCEDURE — 99283 EMERGENCY DEPT VISIT LOW MDM: CPT

## 2017-09-18 RX ORDER — SULFAMETHOXAZOLE AND TRIMETHOPRIM 200; 40 MG/5ML; MG/5ML
20 SUSPENSION ORAL 2 TIMES DAILY
Qty: 200 ML | Refills: 0 | Status: SHIPPED | OUTPATIENT
Start: 2017-09-18

## 2017-09-19 NOTE — ED NOTES
New Gtube placed at this time by MELISSA ch ED, MD. Skin barrier cream placed on irritated red skin around g tube opening.      Janie Reagan RN  09/18/17 2582

## 2017-09-19 NOTE — DISCHARGE INSTRUCTIONS
Medications as ordered  May use G-tube  Apply barrier cream around insertion site daily  Follow up with pmd in 3-5 days for recheck  Return to er for fever, chills, worsening redness, or any new or worsening symptoms

## 2017-09-19 NOTE — ED NOTES
Pt requesting water, nursing home called to confirm that he does have oral fluid at his place of residence. Water given, lights dimmed, warm blankets given, tv on while pt waits for EMS.      Janie Reagan RN  09/18/17 1043

## 2017-09-19 NOTE — ED PROVIDER NOTES
Pt presents to ED for g-tube change due to leaking and drainage. Upon exam, there is a g-tube in LUQ with skin irritation locally and erythema below the g-tube site, likely related to adhesive from dressing. G-tube is not functioning properly.     2054: Janeth Escobedo has replaced g-tube. We will order XR abd KUB to confirm successful replacement. Barrier cream has been placed.     I supervised care provided by the midlevel provider Janeth Escobedo (APRN).    We have discussed this patient's history, physical exam, and treatment plan.   I have reviewed the note and personally saw and examined the patient and agree with the plan of care.    Documentation assistance provided by gavino Shaw for Ismael Patel.  Information recorded by the scribe was done at my direction and has been verified and validated by me.       Mary Shaw  09/18/17 9863       Ismael Patel MD  09/18/17 1894

## 2017-09-19 NOTE — ED NOTES
Report given to [facility].    [facility] called prior to report and they say that EMS should be available to take pt home at midnight.    Pt updated and thanked for his patience.      Janie Reagan RN  09/18/17 8732

## 2017-09-19 NOTE — ED PROVIDER NOTES
EMERGENCY DEPARTMENT ENCOUNTER    CHIEF COMPLAINT  Chief Complaint: g-tube malfunction  History given by: patient, EMS  History limited by: patient's dementia  Room Number: 02/02  PMD: Clary Muhammad MD      HPI:  Pt is a 67 y.o. male who presents with g-tube malfunction. He states that he has had current g-tube in place for approximately 1 month secondary to chronic dysphagia. He reports that today, he and nursing home staff noticed drainage from and leakage around the g-tube site to his left upper abdomen. Per EMS, pt was referred to ED from nursing home for g-tube replacement. Pt denies fevers, abd pain, nausea, and vomiting. Past Medical History of dementia, dysphagia, COPD, HTN, and CAD.     Duration: occurred today   Timing: constant  Location: left upper abdomen  Radiation: none  Quality: none  Intensity/Severity: moderate  Progression: unchanged   Associated Symptoms: drainage from and leakage around g-tube site to left upper abdomen  Aggravating Factors: none  Alleviating Factors: none  Previous Episodes: none mentioned  Treatment before arrival: none mentioned    PAST MEDICAL HISTORY  Active Ambulatory Problems     Diagnosis Date Noted   • Pneumonia of right lower lobe due to infectious organism 02/03/2017   • Dementia without behavioral disturbance 02/06/2017   • Dysphagia 02/06/2017   • DNR (do not resuscitate) 02/08/2017     Resolved Ambulatory Problems     Diagnosis Date Noted   • No Resolved Ambulatory Problems     Past Medical History:   Diagnosis Date   • SCAR (acute kidney injury)    • Brain cancer    • COPD (chronic obstructive pulmonary disease)    • Coronary artery disease    • Dysphagia    • Ex-smoker    • Hiatal hernia    • Hyperlipidemia    • Hypertension    • Malignant neoplasm of head    • Squamous cell carcinoma    • Throat cancer        PAST SURGICAL HISTORY  Past Surgical History:   Procedure Laterality Date   • CARDIAC SURGERY     • SPINE SURGERY         FAMILY HISTORY  History  reviewed. No pertinent family history.    SOCIAL HISTORY  Social History     Social History   • Marital status:      Spouse name: N/A   • Number of children: N/A   • Years of education: N/A     Occupational History   • Not on file.     Social History Main Topics   • Smoking status: Former Smoker   • Smokeless tobacco: Not on file      Comment: stopped about a year ago   • Alcohol use No   • Drug use: No   • Sexual activity: Defer     Other Topics Concern   • Not on file     Social History Narrative         ALLERGIES  Review of patient's allergies indicates no known allergies.    REVIEW OF SYSTEMS  Review of Systems   Unable to perform ROS: Dementia   Constitutional: Negative for fever.   Gastrointestinal: Negative for abdominal pain, nausea and vomiting.        Drainage from and leakage around g-tube site to left upper abdomen       PHYSICAL EXAM  ED Triage Vitals   Temp Heart Rate Resp BP SpO2   09/18/17 1917 09/18/17 1917 09/18/17 1917 09/18/17 1917 09/18/17 1917   98.8 °F (37.1 °C) 98 16 164/75 95 % WNL       Physical Exam   Constitutional: He is oriented to person, place, and time. No distress.   HENT:   Head: Atraumatic.   Mouth/Throat: Mucous membranes are normal.   Eyes: No scleral icterus.   Neck: Normal range of motion. Neck supple.   Cardiovascular: Normal rate, regular rhythm and normal heart sounds.    Pulmonary/Chest: Effort normal and breath sounds normal. No respiratory distress.   Abdominal: Soft. There is no tenderness. There is no rebound and no guarding.   g-tube in left upper abdomen with skin irritation, erythema, and bloody drainage around the insertion site; skin irritation likely related to adhesive from dressing   Neurological: He is alert and oriented to person, place, and time.   Skin: Skin is warm and dry.   Psychiatric: Mood and affect normal.   Nursing note and vitals reviewed.        RADIOLOGY            XR Abdomen KUB (Final result) Result time: 09/18/17 21:27:30     Final  result by Lopez Trujillo MD (09/18/17 21:27:30)     Impression:     Contrast appears to be contained within the stomach without  extravasation observed.      This report was finalized on 9/18/2017 9:27 PM by Lopez Trujillo MD.        Narrative:     SINGLE VIEW ABDOMEN/KUB     HISTORY:g tube placement     COMPARISON: None.     FINDINGS: A total of 3 supine images of the abdomen obtained. 2 images  were obtained following hand injection of water-soluble contrast through  the gastrostomy tube. Moderate bowel gas is present in a nonspecific,  nonobstructive pattern. There is severe lumbar levoscoliosis with  advanced multilevel degenerative change. Calcifications appear to be  largely vascular. The injected contrast collects in the proximal stomach  and appears to reside intraluminally without extravasation.       I ordered the above noted radiological studies and reviewed the images on the PACS system.         PROCEDURES    Feeding Tube Replacement  Date/Time: 9/18/2017 8:49 PM  Performed by: SHANNON HUERTA  Authorized by: EBONI CURRY   Consent: Verbal consent obtained.  Risks and benefits: risks, benefits and alternatives were discussed  Consent given by: patient  Patient understanding: patient states understanding of the procedure being performed  Patient consent: the patient's understanding of the procedure matches consent given  Required items: required blood products, implants, devices, and special equipment available  Patient identity confirmed: verbally with patient and arm band  Preparation: Patient was prepped and draped in the usual sterile fashion.  Indications: tube malfunction  Local anesthesia used: no    Anesthesia:  Local anesthesia used: no    Sedation:  Patient sedated: no  Tube type: gastrostomy  Patient position: supine  Procedure type: replacement  Tube size: 20Fr.  Endoscope used: no  Bulb inflation volume: 20 (ml)  Bulb inflation fluid: normal saline  Placement/position  "confirmation: x-ray  Tube placement difficulty: none  Patient tolerance: Patient tolerated the procedure well with no immediate complications            PROGRESS AND CONSULTS  8:47 PM- Reviewed pt's history and workup with Dr. Patel.  At bedside evaluation, they agree with the plan of care.  8:49 PM- Performed g-tube replacement. See procedure note for further details. Will obtain KUB to confirm g-tube placement.   9:41 PM- KUB confirms g-tube placement. Will provide barrier cream for skin irritation and prescribe abx to cover for infection. Will discharge pt back to nursing home. ED RN will call report to nursing home and provide discharge instructions.       DIAGNOSIS  Final diagnoses:   Gastrostomy tube dysfunction   Skin irritation       FOLLOW UP   Clary Muhammad MD  [address] [place] [id number]  [phone]    Schedule an appointment as soon as possible for a visit  As needed      RX     Medication List         sulfamethoxazole-trimethoprim 200-40 MG/5ML suspension   Commonly known as:  BACTRIM,SEPTRA   20 mL by Per G Tube route 2 (Two) Times a Day.         MEDICATIONS GIVEN IN ER  Medications - No data to display    /75  Pulse 98  Temp 98.8 °F (37.1 °C)  Resp 16  Ht 70\" (177.8 cm)  Wt 140 lb (63.5 kg)  SpO2 95%  BMI 20.09 kg/m2      I personally reviewed the past medical history, past surgical history, social history, family history, current medications and allergies as they appear in this chart.  The scribe's note accurately reflects the work and decisions made by me.     Documentation assistance provided by gavino Finn for WILLIE Bowman on 9/18/2017 at 9:43 PM. Information recorded by the scribe was done at my direction and has been verified and validated by me.     Piper Finn  09/18/17 9506       MELISSA Arevalo  09/18/17 2302    "

## 2017-12-19 ENCOUNTER — APPOINTMENT (OUTPATIENT)
Dept: GENERAL RADIOLOGY | Facility: HOSPITAL | Age: 67
End: 2017-12-19

## 2017-12-19 ENCOUNTER — HOSPITAL ENCOUNTER (EMERGENCY)
Facility: HOSPITAL | Age: 67
Discharge: HOME | End: 2017-12-19
Attending: EMERGENCY MEDICINE | Admitting: EMERGENCY MEDICINE

## 2017-12-19 VITALS
DIASTOLIC BLOOD PRESSURE: 82 MMHG | TEMPERATURE: 98.4 F | RESPIRATION RATE: 15 BRPM | WEIGHT: 140 LBS | BODY MASS INDEX: 20.04 KG/M2 | HEART RATE: 74 BPM | HEIGHT: 70 IN | SYSTOLIC BLOOD PRESSURE: 138 MMHG | OXYGEN SATURATION: 99 %

## 2017-12-19 DIAGNOSIS — T85.528A DISLODGED GASTROSTOMY TUBE: Primary | ICD-10-CM

## 2017-12-19 PROCEDURE — 99284 EMERGENCY DEPT VISIT MOD MDM: CPT

## 2017-12-19 PROCEDURE — 74000 HC ABDOMEN KUB: CPT

## 2017-12-19 RX ORDER — DIAPER,BRIEF,INFANT-TODD,DISP
EACH MISCELLANEOUS 2 TIMES DAILY
COMMUNITY

## 2017-12-19 RX ORDER — MELATONIN 200 MCG
3 TABLET ORAL
COMMUNITY

## 2017-12-19 RX ORDER — FERROUS SULFATE 325(65) MG
325 TABLET ORAL
COMMUNITY

## 2017-12-19 NOTE — ED NOTES
Pt d/c pending transport from facility. Per [facility], they do have transport available but the  is unable to be communicated with at this moment. Staff will call back with ETA of possible pt transport. Patient informed of discharge delay. Cristine, ER charge nurse also notified.      Maggie Reich RN  12/19/17 5721

## 2017-12-19 NOTE — ED NOTES
Pt being discharged to ER waiting area until his transport arrives. Kyle, ER triage nurse informed and accepting care of patient while in waiting area. Discharge paperwork discussed with patient and he has no questions/ concerns at this time. Pt to be assisted to waiting area via wheelchair by LANDEN Rowell.      Maggie Reich RN  12/19/17 9095

## 2017-12-19 NOTE — ED NOTES
"Received call back from [facility].  will be able to  pt \"within the hour\". Informed pt at this time.      Maggie Reich RN  12/19/17 1476    "

## 2017-12-19 NOTE — ED PROVIDER NOTES
EMERGENCY DEPARTMENT ENCOUNTER    CHIEF COMPLAINT  Chief Complaint: Dislodged G-Tube  History given by: Patient  History limited by: Nothing  Room Number: 17/17  PMD: Clary Muhammad MD      HPI:  Pt is a 67 y.o. male who presents to the ED via EMS from [facility] after the patient's G-Tube became dislodged sometime during the night. Patient is unsure of the exact time the tube fell out but he came directly to the ED to have it replaced. He is otherwise asymptomatic at this time.       Duration:  Unknown  Timing: Constant  Location: LUQ  Quality: No pain  Intensity/Severity: Mild  Progression: No Change  Associated Symptoms: N/A  Aggravating Factors: None  Alleviating Factors: None  Previous Episodes: None mentioned  Treatment before arrival: None    PAST MEDICAL HISTORY  Active Ambulatory Problems     Diagnosis Date Noted   • Pneumonia of right lower lobe due to infectious organism 02/03/2017   • Dementia without behavioral disturbance 02/06/2017   • Dysphagia 02/06/2017   • DNR (do not resuscitate) 02/08/2017     Resolved Ambulatory Problems     Diagnosis Date Noted   • No Resolved Ambulatory Problems     Past Medical History:   Diagnosis Date   • SCAR (acute kidney injury)    • Brain cancer    • COPD (chronic obstructive pulmonary disease)    • Coronary artery disease    • Dysphagia    • Ex-smoker    • Hiatal hernia    • Hyperlipidemia    • Hypertension    • Malignant neoplasm of head    • Squamous cell carcinoma    • Throat cancer        PAST SURGICAL HISTORY  Past Surgical History:   Procedure Laterality Date   • CARDIAC SURGERY     • SPINE SURGERY         FAMILY HISTORY  History reviewed. No pertinent family history.    SOCIAL HISTORY  Social History     Social History   • Marital status:      Spouse name: N/A   • Number of children: N/A   • Years of education: N/A     Occupational History   • Not on file.     Social History Main Topics   • Smoking status: Former Smoker   • Smokeless  tobacco: Not on file      Comment: stopped about a year ago   • Alcohol use No   • Drug use: No   • Sexual activity: Defer     Other Topics Concern   • Not on file     Social History Narrative       ALLERGIES  Review of patient's allergies indicates no known allergies.    REVIEW OF SYSTEMS  Review of Systems   Constitutional: Negative.  Negative for chills and fever.   HENT: Negative.  Negative for sore throat.    Eyes: Negative.    Respiratory: Negative.  Negative for cough.    Cardiovascular: Negative.  Negative for chest pain.   Gastrointestinal: Negative.    Genitourinary: Negative.  Negative for dysuria.   Musculoskeletal: Negative.  Negative for back pain.   Skin: Negative.  Negative for rash.   Neurological: Negative.  Negative for headaches.       PHYSICAL EXAM  ED Triage Vitals   Temp Heart Rate Resp BP SpO2   12/19/17 1122 12/19/17 1122 12/19/17 1122 12/19/17 1122 12/19/17 1129   99 °F (37.2 °C) 75 16 147/76 98 %      Temp src Heart Rate Source Patient Position BP Location FiO2 (%)   12/19/17 1122 12/19/17 1122 12/19/17 1122 12/19/17 1122 --   Oral Monitor Lying Left arm        Physical Exam   Constitutional: He is oriented to person, place, and time. No distress.   HENT:   Head: Normocephalic and atraumatic.   Eyes: Pupils are equal, round, and reactive to light.   Pulmonary/Chest: No respiratory distress.   Abdominal: Soft. Bowel sounds are normal. There is no tenderness.   Entry site for G-tube is located at the LUQ    Musculoskeletal: He exhibits no edema.   Neurological: He is alert and oriented to person, place, and time.   Skin: Skin is warm and dry.   Nursing note and vitals reviewed.      RADIOLOGY  XR Abdomen KUB   Final Result       As described.       This report was finalized on 12/19/2017 12:18 PM by Dr. Yair Montgomery MD.             Reviewed XR Abdomen KUB - Water-soluble contrast material was instilled via patient's gastrostomy tube.   The tip of the tube appears properly positioned  at the mid aspect of the stomach. Contrast material is seen in the proximal to mid aspect of the stomach. No extravasated contrast material is noted. The bowel gas pattern is nonobstructive. Free intraperitoneal gas cannot be excluded on a supine radiograph. Chronic calcifications are  apparent at the level of each kidney. Independently viewed by me. Interpreted by radiologist.       I ordered the above noted radiological studies. Interpreted by radiologist. Reviewed by me in PACS.       PROCEDURES  Feeding Tube Replacement  Date/Time: 12/19/2017 11:30 AM  Performed by: VERONICA JOHN  Authorized by: VERONICA JOHN     Consent:     Consent obtained:  Verbal    Consent given by:  Patient  Pre-procedure details:     Old tube type:  Gastrostomy  Anesthesia (see MAR for exact dosages):     Anesthesia method:  None  Procedure details:     Patient position:  Supine    Procedure type:  Replacement    Tube type:  Gastrostomy    Tube size:  16 Fr    Bulb inflation volume:   20 cc's    Bulb inflation fluid:  Normal saline  Post-procedure details:     Placement/position confirmation:  X-ray    Placement difficulty:  Minimal    Patient tolerance of procedure:  Tolerated well, no immediate complications  Comments:      Attempted to place an 18 Fr tube first with no success.                   PROGRESS AND CONSULTS  ED Course     1130  Ordered supplies to replace the G-tube.    1140  Successfully replaced the patient's G-tube but I explained that I would like to review the placement with an XR of his abdomen. Patient agrees with the plan and all questions were addressed.     1237  Rechecked the patient and he is resting comfortably. Discussed the patient's pertinent imaging results, including XR Abdomen KUB shows successful placement of the G-tube so I discussed plan to discharge the patient home and recommended follow up with his PCP as directed. Patient agrees with the plan and all questions were addressed.     Latest vital  signs   BP- 147/76 HR- 75 Temp- 99 °F (37.2 °C) (Oral) O2 sat- 98%        MEDICAL DECISION MAKING  Results were reviewed/discussed with the patient and they were also made aware of online access. Pt also made aware that some labs, such as cultures, will not be resulted during ER visit and follow up with PMD is necessary.     MDM  Number of Diagnoses or Management Options     Amount and/or Complexity of Data Reviewed  Tests in the radiology section of CPT®: ordered and reviewed (XR Abdomen KUB - Water-soluble contrast material was instilled via patient's gastrostomy tube.   The tip of the tube appears properly positioned at the mid aspect of the stomach. Contrast material is seen in the proximal to mid aspect of the stomach. No extravasated contrast material is noted. The bowel gas pattern is nonobstructive. Free intraperitoneal gas cannot be excluded on a supine radiograph. Chronic calcifications are apparent at the level of each kidney.)    Patient Progress  Patient progress: improved         DIAGNOSIS  Final diagnoses:   Dislodged gastrostomy tube       DISPOSITION  DISCHARGE    Patient discharged in stable condition.    Reviewed implications of results, diagnosis, meds, responsibility to follow up, warning signs and symptoms of possible worsening, potential complications and reasons to return to ER, including any other g-tube issues or erythematous streaking around the insertion site.    Patient/Family voiced understanding of above instructions.    Discussed plan for discharge, as there is no emergent indication for admission.  Pt/family is agreeable and understands need for follow up and repeat testing.  Pt is aware that discharge does not mean that nothing is wrong but it indicates no emergency is present that requires admission and they must continue care with follow-up as given below or physician of their choice.     FOLLOW-UP  Clary Muhammad MD  [address]  [facility]  [id number]  [phone]      If symptoms worsen         Medication List      Changed          jevity 1.2 tony liquid   What changed:  Another medication with the same name was removed. Continue   taking this medication, and follow the directions you see here.       PREVIDENT 5000 BOOSTER DT   What changed:  Another medication with the same name was removed. Continue   taking this medication, and follow the directions you see here.         Stop          AQUAPHOR ADVANCED THERAPY ointment       clobetasol 0.05 % ointment   Commonly known as:  TEMOVATE       FIBER LAXATIVE 625 MG tablet   Generic drug:  calcium polycarbophil       guaiFENesin 100 MG/5ML solution oral solution   Commonly known as:  ROBITUSSIN       ibuprofen 600 MG tablet   Commonly known as:  ADVIL,MOTRIN       mirtazapine 7.5 MG half tablet   Commonly known as:  REMERON       mupirocin 2 % ointment   Commonly known as:  BACTROBAN       sulfamethoxazole-trimethoprim 200-40 MG/5ML suspension   Commonly known as:  BACTRIM,SEPTRA       Vitamin C 500 MG capsule               Latest Documented Vital Signs:  As of 12:50 PM  BP- 147/76 HR- 75 Temp- 99 °F (37.2 °C) (Oral) O2 sat- 98%    --  Documentation assistance provided by gavino Cagle for .  Information recorded by the scribe was done at my direction and has been verified and validated by me.        Cirilo Cagle  12/19/17 [id number]       Anil Ho MD  12/19/17 3122

## 2018-04-21 ENCOUNTER — HOSPITAL ENCOUNTER (EMERGENCY)
Facility: HOSPITAL | Age: 68
Discharge: HOME | End: 2018-04-21
Attending: EMERGENCY MEDICINE | Admitting: EMERGENCY MEDICINE

## 2018-04-21 VITALS
HEIGHT: 70 IN | WEIGHT: 145 LBS | OXYGEN SATURATION: 98 % | HEART RATE: 82 BPM | RESPIRATION RATE: 16 BRPM | BODY MASS INDEX: 20.76 KG/M2 | DIASTOLIC BLOOD PRESSURE: 108 MMHG | SYSTOLIC BLOOD PRESSURE: 144 MMHG | TEMPERATURE: 98.6 F

## 2018-04-21 DIAGNOSIS — T85.528A DISLODGED GASTROSTOMY TUBE: Primary | ICD-10-CM

## 2018-04-21 PROCEDURE — 99283 EMERGENCY DEPT VISIT LOW MDM: CPT

## 2018-04-21 NOTE — PROGRESS NOTES
Discharge Planning Assessment  [facility]     Patient Name: Neville Mendez  MRN: [id number]  Today's Date: 4/21/2018    Admit Date: 4/21/2018          Discharge Needs Assessment     Row Name 04/21/18 1210       Living Environment    Able to Return to Prior Arrangements yes   Spoke with pt at bedside regarding ride back to [facility] and he states he doesn't have anyone who can take him. Pt is ambulatory with walker and informed primary RN if facility can meet her at the cab with walker cab voucher could be provided    Living Arrangement Comments --   Pt agreeable to taking a cab back to [facility].       Transition Planning    Transportation Anticipated --   Pt sent to er from [facility] with G-tube out for replacement via ambulance. Pt is ready for dc and is ambulatory so ambulance will not take him back. Cab voucher provided.            Discharge Plan    No documentation.       Destination     No service coordination in this encounter.      Durable Medical Equipment     No service coordination in this encounter.      Dialysis/Infusion     No service coordination in this encounter.      Home Medical Care     No service coordination in this encounter.      Social Care     No service coordination in this encounter.                Demographic Summary    No documentation.           Functional Status    No documentation.           Psychosocial    No documentation.           Abuse/Neglect    No documentation.           Legal    No documentation.           Substance Abuse    No documentation.           Patient Forms    No documentation.         Micaela Ortiz, ADITHYA

## 2018-04-21 NOTE — ED PROVIDER NOTES
EMERGENCY DEPARTMENT ENCOUNTER    CHIEF COMPLAINT  Chief Complaint: Feeding tube displacement  History given by: Patient  History limited by: None  Room Number: 11/11  PMD: Clary Muhammad MD      HPI:  Pt is a 67 y.o. male who presents complaining of g-tube displacement since last night. Patient reports he uses the tube for nightly feedings and medications. Patient reports that he has been eating orally.Patient has a h/o pharyngeal cancer.     Duration:  Since last night  Onset: Sudden  Timing: Constant  Location: abdomen  Progression: Unchanged  Associated Symptoms: None  Aggravating Factors: None specified  Alleviating Factors: None specified  Previous Episodes: None specified  Treatment before arrival: None specified    PAST MEDICAL HISTORY  Active Ambulatory Problems     Diagnosis Date Noted   • Pneumonia of right lower lobe due to infectious organism 02/03/2017   • Dementia without behavioral disturbance 02/06/2017   • Dysphagia 02/06/2017   • DNR (do not resuscitate) 02/08/2017     Resolved Ambulatory Problems     Diagnosis Date Noted   • No Resolved Ambulatory Problems     Past Medical History:   Diagnosis Date   • SCAR (acute kidney injury)    • Brain cancer    • COPD (chronic obstructive pulmonary disease)    • Coronary artery disease    • Dysphagia    • Ex-smoker    • Hiatal hernia    • Hyperlipidemia    • Hypertension    • Malignant neoplasm of head    • Squamous cell carcinoma    • Throat cancer        PAST SURGICAL HISTORY  Past Surgical History:   Procedure Laterality Date   • CARDIAC SURGERY     • SPINE SURGERY         FAMILY HISTORY  History reviewed. No pertinent family history.    SOCIAL HISTORY  Social History     Social History   • Marital status:      Spouse name: N/A   • Number of children: N/A   • Years of education: N/A     Occupational History   • Not on file.     Social History Main Topics   • Smoking status: Former Smoker   • Smokeless tobacco: Not on file      Comment: stopped  about a year ago   • Alcohol use No   • Drug use: No   • Sexual activity: Defer     Other Topics Concern   • Not on file     Social History Narrative   • No narrative on file       ALLERGIES  Review of patient's allergies indicates no known allergies.    REVIEW OF SYSTEMS  Review of Systems   Constitutional: Negative for activity change, appetite change and fever.   HENT: Negative.    Respiratory: Negative for cough and shortness of breath.    Cardiovascular: Negative for chest pain and leg swelling.   Gastrointestinal: Negative for abdominal pain, diarrhea and vomiting.        G-tube displacement   Genitourinary: Negative for decreased urine volume and dysuria.   Skin: Negative for rash.   Neurological: Negative for weakness, numbness and headaches.   All other systems reviewed and are negative.      PHYSICAL EXAM  ED Triage Vitals   Temp Heart Rate Resp BP SpO2   04/21/18 1004 04/21/18 0958 04/21/18 0958 04/21/18 0958 04/21/18 0958   98.6 °F (37 °C) 82 16 (!) 144/108 98 %      Temp src Heart Rate Source Patient Position BP Location FiO2 (%)   04/21/18 1004 -- -- -- --   Oral           Physical Exam   Constitutional: He is oriented to person, place, and time. No distress.   HENT:   Head: Normocephalic and atraumatic.   Eyes: EOM are normal.   Neck: Normal range of motion.   Pulmonary/Chest: No respiratory distress.   Abdominal: There is no tenderness.   LUQ area where feeding tube has fallen out   Musculoskeletal: He exhibits no edema.   Neurological: He is alert and oriented to person, place, and time.   Skin: Skin is warm and dry.   Nursing note and vitals reviewed.      PROCEDURES  Feeding Tube Replacement  Date/Time: 4/21/2018 10:33 AM  Performed by: VERONICA JOHN  Authorized by: VERONICA JOHN     Consent:     Consent obtained:  Verbal    Consent given by:  Patient  Anesthesia (see MAR for exact dosages):     Anesthesia method:  None  Procedure details:     Procedure type:  Replacement    Tube size:  16  Fr  Comments:      16 Fr G tube would not pass. Q tip would pass. Patient has laura eating orally and is not sure that he needs the tube.      PROGRESS AND CONSULTS  ED Course   1034-Discussed plan to replace the tube. Pt understands and agrees with the plan, all questions answered.    1034-Ordered tube replacement.    1039-16 Fr G tube would not pass. We do not have a smaller tube. Discussed plan to contact nursing home. Pt does feels he no longer needs the gtube and has asked that I do not replace it.    1049-Discussed patient's case with patient's RN at the nursing home and informed them that since G-tube has been displaced more than 14 hours, he should be treated in outpatient next week for a replacement of the tube if patient's treatment team decides patient still needs a g-tube.    1056-Rechecked patient. Discussed the plan to dress the site and to discharge. Pt understands and agrees with the plan, all questions answered.    MEDICAL DECISION MAKING  Results were reviewed/discussed with the patient and they were also made aware of online access.    MDM  Number of Diagnoses or Management Options  Dislodged gastrostomy tube:      Amount and/or Complexity of Data Reviewed  Decide to obtain previous medical records or to obtain history from someone other than the patient: yes    Patient Progress  Patient progress: stable         DIAGNOSIS  Final diagnoses:   Dislodged gastrostomy tube       DISPOSITION  DISCHARGE    Patient discharged in stable condition.    Reviewed implications of results, diagnosis, meds, responsibility to follow up, warning signs and symptoms of possible worsening, potential complications and reasons to return to ER.    Patient/Family voiced understanding of above instructions.    Discussed plan for discharge, as there is no emergent indication for admission. Patient referred to primary care provider for BP management due to today's BP. Pt/family is agreeable and understands need for follow up  and repeat testing.  Pt is aware that discharge does not mean that nothing is wrong but it indicates no emergency is present that requires admission and they must continue care with follow-up as given below or physician of their choice.     FOLLOW-UP  Clary Muhammad MD  [address]  [place] [id number] [phone]    Today  to discuss feeding patient and outpatient g-tube placement if needed         Medication List      No changes were made to your prescriptions during this visit.         FOLLOW-UP  Clary Muhammad MD  [address]  [facility] [address]  [phone]    Today  to discuss feeding patient and outpatient g-tube placement if needed         Medication List      No changes were made to your prescriptions during this visit.     Latest Documented Vital Signs:  As of 11:22 AM  BP- (!) 144/108 HR- 82 Temp- 98.6 °F (37 °C) (Oral) O2 sat- 98%    --  Documentation assistance provided by gavino Ramirez and Yas Foss for Dr. Ho.  Information recorded by the scribe was done at my direction and has been verified and validated by me.         Lilbieth Ramirez  04/21/18 1116       Yas Foss  04/21/18 1122       Anil Ho MD  04/21/18 1816

## 2018-04-21 NOTE — PROGRESS NOTES
Discharge Planning Assessment  [facility]     Patient Name: Neville Mendez  MRN: [id number]  Today's Date: 4/21/2018    Admit Date: 4/21/2018          Discharge Needs Assessment     Row Name 04/21/18 1238       Discharge Needs Assessment    Discharge Coordination/Progress --   Spoke with Yaniv at [facility] and made him aware pt will return via cab and they would need to meet them with a walker or wc, he verbalized understanding.    Row Name 04/21/18 1210       Living Environment    Able to Return to Prior Arrangements yes   Spoke with pt at bedside regarding ride back to [facility] and he states he doesn't have anyone who can take him. Pt is ambulatory with walker and informed primary RN if facility can meet her at the cab with walker cab voucher could be provided    Living Arrangement Comments --   Pt agreeable to taking a cab back to [facility].       Transition Planning    Transportation Anticipated --   Pt sent to er from [facility] with G-tube out for replacement via ambulance. Pt is ready for dc and is ambulatory so ambulance will not take him back. Cab voucher provided.            Discharge Plan    No documentation.       Destination     No service coordination in this encounter.      Durable Medical Equipment     No service coordination in this encounter.      Dialysis/Infusion     No service coordination in this encounter.      Home Medical Care     No service coordination in this encounter.      Social Care     No service coordination in this encounter.                Demographic Summary    No documentation.           Functional Status    No documentation.           Psychosocial    No documentation.           Abuse/Neglect    No documentation.           Legal    No documentation.           Substance Abuse    No documentation.           Patient Forms    No documentation.         Micaela Ortiz RN

## 2018-04-21 NOTE — ED NOTES
Dr. Ho spoke with nurse at [facility]. Practitioner at [facility] told pt it was ok to leave out g tube overnight. The smallest g tube we have does not fit (16 FR). Dr. Ho told them it will have to be done as outpatient. Pt does not want g tube placed.      Ulises Sandra RN  04/21/18 8199